# Patient Record
Sex: MALE | Race: WHITE | Employment: OTHER | URBAN - METROPOLITAN AREA
[De-identification: names, ages, dates, MRNs, and addresses within clinical notes are randomized per-mention and may not be internally consistent; named-entity substitution may affect disease eponyms.]

---

## 2019-12-20 ENCOUNTER — APPOINTMENT (OUTPATIENT)
Dept: CT IMAGING | Age: 74
DRG: 293 | End: 2019-12-20
Payer: MEDICARE

## 2019-12-20 ENCOUNTER — HOSPITAL ENCOUNTER (INPATIENT)
Age: 74
LOS: 2 days | Discharge: HOME | DRG: 293 | End: 2019-12-24
Attending: EMERGENCY MEDICINE | Admitting: INTERNAL MEDICINE
Payer: MEDICARE

## 2019-12-20 ENCOUNTER — APPOINTMENT (OUTPATIENT)
Dept: GENERAL RADIOLOGY | Age: 74
DRG: 293 | End: 2019-12-20
Payer: MEDICARE

## 2019-12-20 PROBLEM — I50.9 CHF WITH UNKNOWN LVEF (HCC): Status: ACTIVE | Noted: 2019-12-20

## 2019-12-20 PROBLEM — I82.409 DVT (DEEP VENOUS THROMBOSIS) (HCC): Chronic | Status: ACTIVE | Noted: 2019-12-20

## 2019-12-20 PROBLEM — E87.70 VOLUME OVERLOAD: Status: RESOLVED | Noted: 2019-12-20 | Resolved: 2019-12-20

## 2019-12-20 PROBLEM — E87.70 VOLUME OVERLOAD: Status: ACTIVE | Noted: 2019-12-20

## 2019-12-20 LAB
ALBUMIN SERPL-MCNC: 4 G/DL (ref 3.5–5.2)
ALP BLD-CCNC: 48 U/L (ref 40–129)
ALT SERPL-CCNC: 16 U/L (ref 0–40)
ANION GAP SERPL CALCULATED.3IONS-SCNC: 15 MMOL/L (ref 7–16)
AST SERPL-CCNC: 33 U/L (ref 0–39)
BASOPHILS ABSOLUTE: 0.05 E9/L (ref 0–0.2)
BASOPHILS RELATIVE PERCENT: 0.8 % (ref 0–2)
BILIRUB SERPL-MCNC: 0.6 MG/DL (ref 0–1.2)
BUN BLDV-MCNC: 11 MG/DL (ref 8–23)
CALCIUM SERPL-MCNC: 8.6 MG/DL (ref 8.6–10.2)
CHLORIDE BLD-SCNC: 99 MMOL/L (ref 98–107)
CO2: 28 MMOL/L (ref 22–29)
CREAT SERPL-MCNC: 0.8 MG/DL (ref 0.7–1.2)
EOSINOPHILS ABSOLUTE: 0.07 E9/L (ref 0.05–0.5)
EOSINOPHILS RELATIVE PERCENT: 1.1 % (ref 0–6)
GFR AFRICAN AMERICAN: >60
GFR NON-AFRICAN AMERICAN: >60 ML/MIN/1.73
GLUCOSE BLD-MCNC: 130 MG/DL (ref 74–99)
HCT VFR BLD CALC: 38 % (ref 37–54)
HEMOGLOBIN: 12.7 G/DL (ref 12.5–16.5)
IMMATURE GRANULOCYTES #: 0.03 E9/L
IMMATURE GRANULOCYTES %: 0.5 % (ref 0–5)
LACTIC ACID: 1.7 MMOL/L (ref 0.5–2.2)
LYMPHOCYTES ABSOLUTE: 1.71 E9/L (ref 1.5–4)
LYMPHOCYTES RELATIVE PERCENT: 26.5 % (ref 20–42)
MCH RBC QN AUTO: 34.6 PG (ref 26–35)
MCHC RBC AUTO-ENTMCNC: 33.4 % (ref 32–34.5)
MCV RBC AUTO: 103.5 FL (ref 80–99.9)
MONOCYTES ABSOLUTE: 0.67 E9/L (ref 0.1–0.95)
MONOCYTES RELATIVE PERCENT: 10.4 % (ref 2–12)
NEUTROPHILS ABSOLUTE: 3.93 E9/L (ref 1.8–7.3)
NEUTROPHILS RELATIVE PERCENT: 60.7 % (ref 43–80)
PDW BLD-RTO: 13.3 FL (ref 11.5–15)
PLATELET # BLD: 261 E9/L (ref 130–450)
PMV BLD AUTO: 9.6 FL (ref 7–12)
POTASSIUM SERPL-SCNC: 3 MMOL/L (ref 3.5–5)
PRO-BNP: 2947 PG/ML (ref 0–450)
RBC # BLD: 3.67 E12/L (ref 3.8–5.8)
SODIUM BLD-SCNC: 142 MMOL/L (ref 132–146)
TOTAL PROTEIN: 7.6 G/DL (ref 6.4–8.3)
TROPONIN: <0.01 NG/ML (ref 0–0.03)
WBC # BLD: 6.5 E9/L (ref 4.5–11.5)

## 2019-12-20 PROCEDURE — 2580000003 HC RX 258: Performed by: INTERNAL MEDICINE

## 2019-12-20 PROCEDURE — 96376 TX/PRO/DX INJ SAME DRUG ADON: CPT

## 2019-12-20 PROCEDURE — 36415 COLL VENOUS BLD VENIPUNCTURE: CPT

## 2019-12-20 PROCEDURE — 6360000002 HC RX W HCPCS: Performed by: EMERGENCY MEDICINE

## 2019-12-20 PROCEDURE — 96374 THER/PROPH/DIAG INJ IV PUSH: CPT

## 2019-12-20 PROCEDURE — 96375 TX/PRO/DX INJ NEW DRUG ADDON: CPT

## 2019-12-20 PROCEDURE — G0378 HOSPITAL OBSERVATION PER HR: HCPCS

## 2019-12-20 PROCEDURE — 83605 ASSAY OF LACTIC ACID: CPT

## 2019-12-20 PROCEDURE — 6370000000 HC RX 637 (ALT 250 FOR IP): Performed by: INTERNAL MEDICINE

## 2019-12-20 PROCEDURE — 71275 CT ANGIOGRAPHY CHEST: CPT

## 2019-12-20 PROCEDURE — 84484 ASSAY OF TROPONIN QUANT: CPT

## 2019-12-20 PROCEDURE — 83880 ASSAY OF NATRIURETIC PEPTIDE: CPT

## 2019-12-20 PROCEDURE — 6360000004 HC RX CONTRAST MEDICATION: Performed by: RADIOLOGY

## 2019-12-20 PROCEDURE — 99285 EMERGENCY DEPT VISIT HI MDM: CPT

## 2019-12-20 PROCEDURE — 2580000003 HC RX 258: Performed by: RADIOLOGY

## 2019-12-20 PROCEDURE — 93005 ELECTROCARDIOGRAM TRACING: CPT | Performed by: EMERGENCY MEDICINE

## 2019-12-20 PROCEDURE — 71045 X-RAY EXAM CHEST 1 VIEW: CPT

## 2019-12-20 PROCEDURE — 6370000000 HC RX 637 (ALT 250 FOR IP): Performed by: EMERGENCY MEDICINE

## 2019-12-20 PROCEDURE — 80053 COMPREHEN METABOLIC PANEL: CPT

## 2019-12-20 PROCEDURE — 85025 COMPLETE CBC W/AUTO DIFF WBC: CPT

## 2019-12-20 PROCEDURE — 94640 AIRWAY INHALATION TREATMENT: CPT

## 2019-12-20 PROCEDURE — 6360000002 HC RX W HCPCS: Performed by: INTERNAL MEDICINE

## 2019-12-20 PROCEDURE — 94664 DEMO&/EVAL PT USE INHALER: CPT

## 2019-12-20 PROCEDURE — 94761 N-INVAS EAR/PLS OXIMETRY MLT: CPT

## 2019-12-20 RX ORDER — IPRATROPIUM BROMIDE AND ALBUTEROL SULFATE 2.5; .5 MG/3ML; MG/3ML
1 SOLUTION RESPIRATORY (INHALATION)
Status: DISCONTINUED | OUTPATIENT
Start: 2019-12-20 | End: 2019-12-24 | Stop reason: HOSPADM

## 2019-12-20 RX ORDER — ALLOPURINOL 300 MG/1
300 TABLET ORAL DAILY
COMMUNITY

## 2019-12-20 RX ORDER — ALLOPURINOL 300 MG/1
300 TABLET ORAL DAILY
Status: DISCONTINUED | OUTPATIENT
Start: 2019-12-21 | End: 2019-12-24 | Stop reason: HOSPADM

## 2019-12-20 RX ORDER — POTASSIUM CHLORIDE 20 MEQ/1
40 TABLET, EXTENDED RELEASE ORAL ONCE
Status: COMPLETED | OUTPATIENT
Start: 2019-12-20 | End: 2019-12-20

## 2019-12-20 RX ORDER — SODIUM CHLORIDE 0.9 % (FLUSH) 0.9 %
10 SYRINGE (ML) INJECTION PRN
Status: DISCONTINUED | OUTPATIENT
Start: 2019-12-20 | End: 2019-12-24 | Stop reason: HOSPADM

## 2019-12-20 RX ORDER — HYDROCODONE BITARTRATE AND ACETAMINOPHEN 7.5; 325 MG/1; MG/1
1 TABLET ORAL EVERY 6 HOURS PRN
Status: ON HOLD | COMMUNITY
End: 2021-01-07 | Stop reason: SDUPTHER

## 2019-12-20 RX ORDER — LOPERAMIDE HYDROCHLORIDE 2 MG/1
2 CAPSULE ORAL
COMMUNITY

## 2019-12-20 RX ORDER — OMEPRAZOLE 20 MG/1
20 CAPSULE, DELAYED RELEASE ORAL DAILY
Status: ON HOLD | COMMUNITY
End: 2021-01-07 | Stop reason: HOSPADM

## 2019-12-20 RX ORDER — FUROSEMIDE 10 MG/ML
40 INJECTION INTRAMUSCULAR; INTRAVENOUS ONCE
Status: COMPLETED | OUTPATIENT
Start: 2019-12-20 | End: 2019-12-20

## 2019-12-20 RX ORDER — PANTOPRAZOLE SODIUM 40 MG/1
40 TABLET, DELAYED RELEASE ORAL
Status: DISCONTINUED | OUTPATIENT
Start: 2019-12-21 | End: 2019-12-24 | Stop reason: HOSPADM

## 2019-12-20 RX ORDER — SODIUM CHLORIDE 0.9 % (FLUSH) 0.9 %
10 SYRINGE (ML) INJECTION ONCE
Status: COMPLETED | OUTPATIENT
Start: 2019-12-20 | End: 2019-12-20

## 2019-12-20 RX ORDER — ONDANSETRON 2 MG/ML
4 INJECTION INTRAMUSCULAR; INTRAVENOUS EVERY 6 HOURS PRN
Status: DISCONTINUED | OUTPATIENT
Start: 2019-12-20 | End: 2019-12-24 | Stop reason: HOSPADM

## 2019-12-20 RX ORDER — ACETAMINOPHEN 325 MG/1
650 TABLET ORAL EVERY 4 HOURS PRN
Status: DISCONTINUED | OUTPATIENT
Start: 2019-12-20 | End: 2019-12-24 | Stop reason: HOSPADM

## 2019-12-20 RX ORDER — HYDROCODONE BITARTRATE AND ACETAMINOPHEN 7.5; 325 MG/1; MG/1
1 TABLET ORAL EVERY 6 HOURS PRN
Status: DISCONTINUED | OUTPATIENT
Start: 2019-12-20 | End: 2019-12-24 | Stop reason: HOSPADM

## 2019-12-20 RX ORDER — SODIUM CHLORIDE 0.9 % (FLUSH) 0.9 %
10 SYRINGE (ML) INJECTION EVERY 12 HOURS SCHEDULED
Status: DISCONTINUED | OUTPATIENT
Start: 2019-12-20 | End: 2019-12-24 | Stop reason: HOSPADM

## 2019-12-20 RX ORDER — FUROSEMIDE 10 MG/ML
40 INJECTION INTRAMUSCULAR; INTRAVENOUS 2 TIMES DAILY
Status: DISCONTINUED | OUTPATIENT
Start: 2019-12-20 | End: 2019-12-23

## 2019-12-20 RX ADMIN — POTASSIUM CHLORIDE 40 MEQ: 1500 TABLET, EXTENDED RELEASE ORAL at 12:55

## 2019-12-20 RX ADMIN — Medication 10 ML: at 12:32

## 2019-12-20 RX ADMIN — SODIUM CHLORIDE, PRESERVATIVE FREE 10 ML: 5 INJECTION INTRAVENOUS at 21:16

## 2019-12-20 RX ADMIN — APIXABAN 5 MG: 5 TABLET, FILM COATED ORAL at 21:15

## 2019-12-20 RX ADMIN — IOPAMIDOL 60 ML: 755 INJECTION, SOLUTION INTRAVENOUS at 12:32

## 2019-12-20 RX ADMIN — FUROSEMIDE 40 MG: 10 INJECTION, SOLUTION INTRAMUSCULAR; INTRAVENOUS at 14:24

## 2019-12-20 RX ADMIN — POTASSIUM CHLORIDE 40 MEQ: 1500 TABLET, EXTENDED RELEASE ORAL at 16:33

## 2019-12-20 RX ADMIN — IPRATROPIUM BROMIDE AND ALBUTEROL SULFATE 1 AMPULE: 2.5; .5 SOLUTION RESPIRATORY (INHALATION) at 20:04

## 2019-12-20 RX ADMIN — IPRATROPIUM BROMIDE AND ALBUTEROL SULFATE 1 AMPULE: 2.5; .5 SOLUTION RESPIRATORY (INHALATION) at 16:52

## 2019-12-20 RX ADMIN — FUROSEMIDE 40 MG: 10 INJECTION, SOLUTION INTRAMUSCULAR; INTRAVENOUS at 18:25

## 2019-12-20 ASSESSMENT — PAIN DESCRIPTION - ONSET: ONSET: ON-GOING

## 2019-12-20 ASSESSMENT — PAIN SCALES - GENERAL
PAINLEVEL_OUTOF10: 0
PAINLEVEL_OUTOF10: 5
PAINLEVEL_OUTOF10: 5

## 2019-12-20 ASSESSMENT — PAIN DESCRIPTION - LOCATION
LOCATION: CHEST
LOCATION: CHEST

## 2019-12-20 ASSESSMENT — PAIN DESCRIPTION - PAIN TYPE
TYPE: ACUTE PAIN
TYPE: ACUTE PAIN

## 2019-12-20 ASSESSMENT — PAIN DESCRIPTION - FREQUENCY: FREQUENCY: CONTINUOUS

## 2019-12-20 ASSESSMENT — PAIN - FUNCTIONAL ASSESSMENT: PAIN_FUNCTIONAL_ASSESSMENT: ACTIVITIES ARE NOT PREVENTED

## 2019-12-20 ASSESSMENT — PAIN DESCRIPTION - PROGRESSION: CLINICAL_PROGRESSION: NOT CHANGED

## 2019-12-20 ASSESSMENT — PAIN DESCRIPTION - DESCRIPTORS: DESCRIPTORS: CRAMPING;CRUSHING;DISCOMFORT

## 2019-12-20 NOTE — ED PROVIDER NOTES
HPI:  12/20/19,   Time: 1:44 PM       Max Ponce is a 66 y.o. male presenting to the ED for shortness of breath. Patient states he was short of breath since yesterday. Does complain of intermittent chest heaviness. Has no chest heaviness now. Does not wear oxygen. Does have a history of DVT, he takes his Eliquis intermittently. Denies any fever, chills,, sputum, nausea, vomit, change in bowel bladder, neck pain or stiffness, or any other symptoms or complaints. Review of Systems:   Pertinent positives and negatives are stated within HPI, all other systems reviewed and are negative.          --------------------------------------------- PAST HISTORY ---------------------------------------------  Past Medical History:  has no past medical history on file. Past Surgical History:  has no past surgical history on file. Social History:      Family History: family history is not on file. The patients home medications have been reviewed. Allergies: Patient has no known allergies. ---------------------------------------------------PHYSICAL EXAM--------------------------------------    Constitutional/General: Alert and oriented x3, well appearing, non toxic in NAD  Head: Normocephalic and atraumatic  Eyes: PERRL, EOMI, conjunctive normal, sclera non icteric  Mouth: Oropharynx clear, handling secretions, no trismus, no asymmetry of the posterior oropharynx or uvular edema  Neck: Supple, full ROM, non tender to palpation in the midline, no stridor, no crepitus, no meningeal signs  Respiratory: Lungs clear to auscultation bilaterally, no wheezes, rales, or rhonchi. Not in respiratory distress  Cardiovascular:  Regular rate. Regular rhythm. No murmurs, gallops, or rubs. 2+ distal pulses  Chest: No chest wall tenderness  GI:  Abdomen Soft, Non tender, Non distended. +BS. No organomegaly, no palpable masses,  No rebound, guarding, or rigidity. Musculoskeletal: Moves all extremities x 4. mg/dL    Alkaline Phosphatase 48 40 - 129 U/L    ALT 16 0 - 40 U/L    AST 33 0 - 39 U/L   Troponin   Result Value Ref Range    Troponin <0.01 0.00 - 0.03 ng/mL   Brain Natriuretic Peptide   Result Value Ref Range    Pro-BNP 2,947 (H) 0 - 450 pg/mL   Lactic Acid, Plasma   Result Value Ref Range    Lactic Acid 1.7 0.5 - 2.2 mmol/L       RADIOLOGY:  Interpreted by Radiologist.  CTA PULMONARY W CONTRAST   Final Result      No CT evidence for pulmonary embolism or other significant vascular   abnormality      Bilateral posterior pleural effusions      Parenchymal findings suggesting early pulmonary edema         XR CHEST PORTABLE   Final Result      Cardiomegaly without failure      Basilar areas of parenchymal opacification possibly chronic             EKG:  This EKG is signed and interpreted by the EP. Sinus, rate 108, no STEMI      ------------------------- NURSING NOTES AND VITALS REVIEWED ---------------------------   The nursing notes within the ED encounter and vital signs as below have been reviewed by myself. BP (!) 186/114   Pulse 98   Temp 98.2 °F (36.8 °C) (Infrared)   Resp 18   Ht 5' 11\" (1.803 m)   Wt 200 lb (90.7 kg)   SpO2 96%   BMI 27.89 kg/m²   Oxygen Saturation Interpretation: Improved after treatment    The patients available past medical records and past encounters were reviewed. ------------------------------ ED COURSE/MEDICAL DECISION MAKING----------------------  Medications   furosemide (LASIX) injection 40 mg (has no administration in time range)   potassium chloride (KLOR-CON M) extended release tablet 40 mEq (40 mEq Oral Given 12/20/19 1255)   sodium chloride flush 0.9 % injection 10 mL (10 mLs Intravenous Given 12/20/19 1232)   iopamidol (ISOVUE-370) 76 % injection 60 mL (60 mLs Intravenous Given 12/20/19 1232)         ED COURSE:       Medical Decision Making:    Presents hypoxemic. Did receive oxygen responded. Labs imaging reviewed.   Does have lateral pleural effusion,

## 2019-12-20 NOTE — LETTER
Beneficiary Notification Letter  BPCI Advanced     Your Doctor or [address],    We wanted to let you know that your health care provider, [address] [place] [address] Chris, has volunteered to take part in our [facility] for [facility] & Medicaid Services (CMS) Bundled Payments for [facility] (BPCI Advanced). This doesnt change your Medicare rights or benefits and you dont need to do anything. What are bundled payments? A bundled payment combines, or bundles together, payments that Medicare makes to your health care providers for the many different kinds of medical services you might get in a specific time period. In BPCI Advanced, this time period could include a hospital inpatient stay or outpatient procedure, plus 90 days. Why would Medicare bundle payments? Bundled payments are thought of as a value-based way to pay because health care providers are responsible for both the quality and cost of medical care they give. This is a relatively new way of paying health care providers compared to thefee-for-service way Medicare has traditionally paid, where providers are paid separately for each service they provide. Bundled payments encourage these providers to work together to provide better, more coordinated care during your hospital stay, or outpatient procedure, and through your recovery. What does BPCI Advance mean for me? Youre more likely to get even better care when hospitals, doctors, and other health care providers work together. In BPCI Advanced, hospitals, doctors, and other health care providers may be rewarded for providing better, more coordinated health care. Medicare will watch BPCI Advanced participants closely to make sure that you and other patients keep getting efficient, high quality care. What do I need to know about BPCI Advanced? Whats most important for you to know is that your Medicare rights and benefits wont change because your health care provider is participating in [address] [place]. Medicare will keep covering all of your medically necessary services. Even though Medicare will pay your doctor in a different way under BPCI Advanced, how much you have to pay wont change. Health care providers and suppliers who are enrolled in Medicare will submit their Medicare claims like they always have. Youll have all the same Medicare rights and protections, including the right to choose which hospital, doctor, or other health care provider you see. If you dont want to get care from a health care provider whos participating in [address] [place], then youll have to choose a different health care provider whos not participating in the Model. How can I give feedback about my health care? Medicare might ask you to take a voluntary survey about the services and care you received from [address] [place] [address] during your hospital stay or outpatient procedure and for a specific period of time afterwards. You can decide whether you want to take the voluntary survey, but if you do, itll help Medicare make BPCI Advanced and the care of other Medicare patients better. If you have concerns or complaints about your care, you can:   · Talk to your doctor or health care provider. · Contact your Beneficiary and Family Centered Care Quality Improvement   Organization [facility]). You can get your [employer]-QIOs phone number  at  Medicare.gov/contacts or by calling 1-800-MEDICARE. TTY users can call  6-166.510.2390. Where can I learn more about BPCI Advanced? Learn more about BPCI Advanced at https://innovation.cms.gov/initiatives/bpci-advanced/:  · A list of all the hospitals and physician group practices in the country participating in [address] [place].

## 2019-12-21 LAB
ALBUMIN SERPL-MCNC: 3.9 G/DL (ref 3.5–5.2)
ALP BLD-CCNC: 49 U/L (ref 40–129)
ALT SERPL-CCNC: 13 U/L (ref 0–40)
ANION GAP SERPL CALCULATED.3IONS-SCNC: 17 MMOL/L (ref 7–16)
ANION GAP SERPL CALCULATED.3IONS-SCNC: 20 MMOL/L (ref 7–16)
AST SERPL-CCNC: 23 U/L (ref 0–39)
BASOPHILS ABSOLUTE: 0.03 E9/L (ref 0–0.2)
BASOPHILS RELATIVE PERCENT: 0.6 % (ref 0–2)
BILIRUB SERPL-MCNC: 0.7 MG/DL (ref 0–1.2)
BUN BLDV-MCNC: 10 MG/DL (ref 8–23)
BUN BLDV-MCNC: 9 MG/DL (ref 8–23)
CALCIUM SERPL-MCNC: 8.9 MG/DL (ref 8.6–10.2)
CALCIUM SERPL-MCNC: 9.5 MG/DL (ref 8.6–10.2)
CHLORIDE BLD-SCNC: 96 MMOL/L (ref 98–107)
CHLORIDE BLD-SCNC: 96 MMOL/L (ref 98–107)
CHOLESTEROL, TOTAL: 170 MG/DL (ref 0–199)
CO2: 25 MMOL/L (ref 22–29)
CO2: 31 MMOL/L (ref 22–29)
CREAT SERPL-MCNC: 0.9 MG/DL (ref 0.7–1.2)
CREAT SERPL-MCNC: 0.9 MG/DL (ref 0.7–1.2)
EKG ATRIAL RATE: 108 BPM
EKG P AXIS: 118 DEGREES
EKG P-R INTERVAL: 192 MS
EKG Q-T INTERVAL: 362 MS
EKG QRS DURATION: 110 MS
EKG QTC CALCULATION (BAZETT): 485 MS
EKG R AXIS: -26 DEGREES
EKG T AXIS: 48 DEGREES
EKG VENTRICULAR RATE: 108 BPM
EOSINOPHILS ABSOLUTE: 0.07 E9/L (ref 0.05–0.5)
EOSINOPHILS RELATIVE PERCENT: 1.4 % (ref 0–6)
GFR AFRICAN AMERICAN: >60
GFR AFRICAN AMERICAN: >60
GFR NON-AFRICAN AMERICAN: >60 ML/MIN/1.73
GFR NON-AFRICAN AMERICAN: >60 ML/MIN/1.73
GLUCOSE BLD-MCNC: 111 MG/DL (ref 74–99)
GLUCOSE BLD-MCNC: 125 MG/DL (ref 74–99)
HCT VFR BLD CALC: 39.6 % (ref 37–54)
HDLC SERPL-MCNC: 74 MG/DL
HEMOGLOBIN: 13.4 G/DL (ref 12.5–16.5)
IMMATURE GRANULOCYTES #: 0.03 E9/L
IMMATURE GRANULOCYTES %: 0.6 % (ref 0–5)
LDL CHOLESTEROL CALCULATED: 75 MG/DL (ref 0–99)
LV EF: 43 %
LVEF MODALITY: NORMAL
LYMPHOCYTES ABSOLUTE: 1.36 E9/L (ref 1.5–4)
LYMPHOCYTES RELATIVE PERCENT: 26.5 % (ref 20–42)
MAGNESIUM: 1 MG/DL (ref 1.6–2.6)
MAGNESIUM: 1.7 MG/DL (ref 1.6–2.6)
MCH RBC QN AUTO: 34.8 PG (ref 26–35)
MCHC RBC AUTO-ENTMCNC: 33.8 % (ref 32–34.5)
MCV RBC AUTO: 102.9 FL (ref 80–99.9)
MONOCYTES ABSOLUTE: 0.63 E9/L (ref 0.1–0.95)
MONOCYTES RELATIVE PERCENT: 12.3 % (ref 2–12)
NEUTROPHILS ABSOLUTE: 3.02 E9/L (ref 1.8–7.3)
NEUTROPHILS RELATIVE PERCENT: 58.6 % (ref 43–80)
PDW BLD-RTO: 13.1 FL (ref 11.5–15)
PLATELET # BLD: 252 E9/L (ref 130–450)
PMV BLD AUTO: 9.9 FL (ref 7–12)
POTASSIUM SERPL-SCNC: 2.8 MMOL/L (ref 3.5–5)
POTASSIUM SERPL-SCNC: 3.4 MMOL/L (ref 3.5–5)
RBC # BLD: 3.85 E12/L (ref 3.8–5.8)
SODIUM BLD-SCNC: 141 MMOL/L (ref 132–146)
SODIUM BLD-SCNC: 144 MMOL/L (ref 132–146)
T4 FREE: 1.23 NG/DL (ref 0.93–1.7)
TOTAL PROTEIN: 7 G/DL (ref 6.4–8.3)
TRIGL SERPL-MCNC: 107 MG/DL (ref 0–149)
TSH SERPL DL<=0.05 MIU/L-ACNC: 3.9 UIU/ML (ref 0.27–4.2)
VLDLC SERPL CALC-MCNC: 21 MG/DL
WBC # BLD: 5.1 E9/L (ref 4.5–11.5)

## 2019-12-21 PROCEDURE — 6370000000 HC RX 637 (ALT 250 FOR IP): Performed by: INTERNAL MEDICINE

## 2019-12-21 PROCEDURE — 93306 TTE W/DOPPLER COMPLETE: CPT

## 2019-12-21 PROCEDURE — 83735 ASSAY OF MAGNESIUM: CPT

## 2019-12-21 PROCEDURE — 93010 ELECTROCARDIOGRAM REPORT: CPT | Performed by: INTERNAL MEDICINE

## 2019-12-21 PROCEDURE — 2580000003 HC RX 258: Performed by: INTERNAL MEDICINE

## 2019-12-21 PROCEDURE — G0378 HOSPITAL OBSERVATION PER HR: HCPCS

## 2019-12-21 PROCEDURE — 96366 THER/PROPH/DIAG IV INF ADDON: CPT

## 2019-12-21 PROCEDURE — 6360000002 HC RX W HCPCS: Performed by: PHYSICIAN ASSISTANT

## 2019-12-21 PROCEDURE — 6360000002 HC RX W HCPCS: Performed by: INTERNAL MEDICINE

## 2019-12-21 PROCEDURE — 84443 ASSAY THYROID STIM HORMONE: CPT

## 2019-12-21 PROCEDURE — 96376 TX/PRO/DX INJ SAME DRUG ADON: CPT

## 2019-12-21 PROCEDURE — 85025 COMPLETE CBC W/AUTO DIFF WBC: CPT

## 2019-12-21 PROCEDURE — 80053 COMPREHEN METABOLIC PANEL: CPT

## 2019-12-21 PROCEDURE — 6370000000 HC RX 637 (ALT 250 FOR IP): Performed by: PHYSICIAN ASSISTANT

## 2019-12-21 PROCEDURE — 94640 AIRWAY INHALATION TREATMENT: CPT

## 2019-12-21 PROCEDURE — 84439 ASSAY OF FREE THYROXINE: CPT

## 2019-12-21 PROCEDURE — 80048 BASIC METABOLIC PNL TOTAL CA: CPT

## 2019-12-21 PROCEDURE — 96365 THER/PROPH/DIAG IV INF INIT: CPT

## 2019-12-21 PROCEDURE — 36415 COLL VENOUS BLD VENIPUNCTURE: CPT

## 2019-12-21 PROCEDURE — 80061 LIPID PANEL: CPT

## 2019-12-21 RX ORDER — MAGNESIUM SULFATE IN WATER 40 MG/ML
2 INJECTION, SOLUTION INTRAVENOUS ONCE
Status: COMPLETED | OUTPATIENT
Start: 2019-12-21 | End: 2019-12-21

## 2019-12-21 RX ORDER — POTASSIUM CHLORIDE 20 MEQ/1
40 TABLET, EXTENDED RELEASE ORAL DAILY
Status: DISCONTINUED | OUTPATIENT
Start: 2019-12-21 | End: 2019-12-22

## 2019-12-21 RX ORDER — POTASSIUM CHLORIDE 7.45 MG/ML
10 INJECTION INTRAVENOUS PRN
Status: DISCONTINUED | OUTPATIENT
Start: 2019-12-21 | End: 2019-12-24 | Stop reason: HOSPADM

## 2019-12-21 RX ORDER — POTASSIUM CHLORIDE 20 MEQ/1
40 TABLET, EXTENDED RELEASE ORAL PRN
Status: DISCONTINUED | OUTPATIENT
Start: 2019-12-21 | End: 2019-12-23

## 2019-12-21 RX ADMIN — IPRATROPIUM BROMIDE AND ALBUTEROL SULFATE 1 AMPULE: 2.5; .5 SOLUTION RESPIRATORY (INHALATION) at 08:39

## 2019-12-21 RX ADMIN — ALLOPURINOL 300 MG: 300 TABLET ORAL at 09:29

## 2019-12-21 RX ADMIN — SODIUM CHLORIDE, PRESERVATIVE FREE 10 ML: 5 INJECTION INTRAVENOUS at 09:30

## 2019-12-21 RX ADMIN — POTASSIUM CHLORIDE 40 MEQ: 1500 TABLET, EXTENDED RELEASE ORAL at 16:46

## 2019-12-21 RX ADMIN — FUROSEMIDE 40 MG: 10 INJECTION, SOLUTION INTRAMUSCULAR; INTRAVENOUS at 09:35

## 2019-12-21 RX ADMIN — FUROSEMIDE 40 MG: 10 INJECTION, SOLUTION INTRAMUSCULAR; INTRAVENOUS at 17:51

## 2019-12-21 RX ADMIN — IPRATROPIUM BROMIDE AND ALBUTEROL SULFATE 1 AMPULE: 2.5; .5 SOLUTION RESPIRATORY (INHALATION) at 20:46

## 2019-12-21 RX ADMIN — POTASSIUM CHLORIDE 40 MEQ: 1500 TABLET, EXTENDED RELEASE ORAL at 09:29

## 2019-12-21 RX ADMIN — PANTOPRAZOLE SODIUM 40 MG: 40 TABLET, DELAYED RELEASE ORAL at 09:30

## 2019-12-21 RX ADMIN — SODIUM CHLORIDE, PRESERVATIVE FREE 10 ML: 5 INJECTION INTRAVENOUS at 17:51

## 2019-12-21 RX ADMIN — APIXABAN 5 MG: 5 TABLET, FILM COATED ORAL at 19:55

## 2019-12-21 RX ADMIN — APIXABAN 5 MG: 5 TABLET, FILM COATED ORAL at 09:30

## 2019-12-21 RX ADMIN — MAGNESIUM SULFATE HEPTAHYDRATE 2 G: 40 INJECTION, SOLUTION INTRAVENOUS at 09:28

## 2019-12-21 RX ADMIN — IPRATROPIUM BROMIDE AND ALBUTEROL SULFATE 1 AMPULE: 2.5; .5 SOLUTION RESPIRATORY (INHALATION) at 15:53

## 2019-12-21 RX ADMIN — SODIUM CHLORIDE, PRESERVATIVE FREE 10 ML: 5 INJECTION INTRAVENOUS at 19:55

## 2019-12-21 ASSESSMENT — PAIN SCALES - GENERAL
PAINLEVEL_OUTOF10: 0

## 2019-12-21 NOTE — H&P
Cabrera Givens M.D. History and Physical      CHIEF COMPLAINT:  Band of tightness and sob     Reason for Admission:  Chest pain     History Obtained From:  Patient     HISTORY OF PRESENT ILLNESS:      The patient is a 66 y.o. male of Priti Putnam MD with significant past medical history of np medical conditions except htn presents with  shortness of brath and chest pain. He was at rest when it started. No radiation of pain . He has noted swelling in legs. No other complaints. Pt with h/o DVT in leg on eliquis . cta chest negative for pe   ED course with   /67   Pulse 111   Temp 97.9 °F (36.6 °C) (Temporal)   Resp 20   Ht 5' 11\" (1.803 m)   Wt 194 lb (88 kg)   SpO2 92%   BMI 27.06 kg/m²   bnp 3000  Hypokalemia'  hypomag  Past Medical History:    History reviewed. No pertinent past medical history. Past Surgical History:    No past surgical history on file. Medications Prior to Admission:    Medications Prior to Admission: allopurinol (ZYLOPRIM) 300 MG tablet, Take 300 mg by mouth daily  apixaban (ELIQUIS) 5 MG TABS tablet, Take 5 mg by mouth 2 times daily  loperamide (IMODIUM) 2 MG capsule, Take 2 mg by mouth every 4-6 hours as needed for Diarrhea  HYDROcodone-acetaminophen (NORCO) 7.5-325 MG per tablet, Take 1 tablet by mouth every 6 hours as needed for Pain. omeprazole (PRILOSEC) 20 MG delayed release capsule, Take 20 mg by mouth daily  influenza virus trivalent vaccine (FLUZONE) injection, Inject 0.5 mLs into the muscle once Given 10/2019    Allergies:  Patient has no known allergies. Social History:   TOBACCO:   has no history on file for tobacco.  ETOH:   has no history on file for alcohol. MARITAL STATUS:    OCCUPATION:      Family History:   No family history on file.     REVIEW OF SYSTEMS:    General ROS:  As above   Hematological and Lymphatic ROS: negative  Endocrine ROS: negative  Respiratory ROS: no cough, shortness of breath, or echo pending   ic diuresis   cxr in am   Supplement mg and k   consult cards for new chf / ischemic eval   Continue eliquis for ho dvt     DVT Proph  PT?OT   dC plan         Benjie Habermann, MD  12/21/2019  3:25 PM

## 2019-12-22 PROBLEM — R07.9 CHEST PAIN: Status: ACTIVE | Noted: 2019-12-22

## 2019-12-22 LAB
ANION GAP SERPL CALCULATED.3IONS-SCNC: 21 MMOL/L (ref 7–16)
BUN BLDV-MCNC: 14 MG/DL (ref 8–23)
CALCIUM SERPL-MCNC: 8.9 MG/DL (ref 8.6–10.2)
CHLORIDE BLD-SCNC: 97 MMOL/L (ref 98–107)
CO2: 25 MMOL/L (ref 22–29)
CREAT SERPL-MCNC: 1.1 MG/DL (ref 0.7–1.2)
GFR AFRICAN AMERICAN: >60
GFR NON-AFRICAN AMERICAN: >60 ML/MIN/1.73
GLUCOSE BLD-MCNC: 117 MG/DL (ref 74–99)
MAGNESIUM: 1.4 MG/DL (ref 1.6–2.6)
POTASSIUM SERPL-SCNC: 3.2 MMOL/L (ref 3.5–5)
SODIUM BLD-SCNC: 143 MMOL/L (ref 132–146)

## 2019-12-22 PROCEDURE — 6370000000 HC RX 637 (ALT 250 FOR IP): Performed by: INTERNAL MEDICINE

## 2019-12-22 PROCEDURE — 2580000003 HC RX 258: Performed by: INTERNAL MEDICINE

## 2019-12-22 PROCEDURE — 36415 COLL VENOUS BLD VENIPUNCTURE: CPT

## 2019-12-22 PROCEDURE — 6360000002 HC RX W HCPCS: Performed by: INTERNAL MEDICINE

## 2019-12-22 PROCEDURE — 96376 TX/PRO/DX INJ SAME DRUG ADON: CPT

## 2019-12-22 PROCEDURE — 2060000000 HC ICU INTERMEDIATE R&B

## 2019-12-22 PROCEDURE — 94640 AIRWAY INHALATION TREATMENT: CPT

## 2019-12-22 PROCEDURE — 83735 ASSAY OF MAGNESIUM: CPT

## 2019-12-22 PROCEDURE — 80048 BASIC METABOLIC PNL TOTAL CA: CPT

## 2019-12-22 RX ORDER — MAGNESIUM SULFATE IN WATER 40 MG/ML
2 INJECTION, SOLUTION INTRAVENOUS ONCE
Status: COMPLETED | OUTPATIENT
Start: 2019-12-22 | End: 2019-12-22

## 2019-12-22 RX ORDER — SPIRONOLACTONE 25 MG/1
25 TABLET ORAL DAILY
Status: DISCONTINUED | OUTPATIENT
Start: 2019-12-22 | End: 2019-12-24 | Stop reason: HOSPADM

## 2019-12-22 RX ORDER — METOPROLOL SUCCINATE 50 MG/1
50 TABLET, EXTENDED RELEASE ORAL DAILY
Status: DISCONTINUED | OUTPATIENT
Start: 2019-12-22 | End: 2019-12-24 | Stop reason: HOSPADM

## 2019-12-22 RX ADMIN — FUROSEMIDE 40 MG: 10 INJECTION, SOLUTION INTRAMUSCULAR; INTRAVENOUS at 08:41

## 2019-12-22 RX ADMIN — SPIRONOLACTONE 25 MG: 25 TABLET ORAL at 17:52

## 2019-12-22 RX ADMIN — METOPROLOL SUCCINATE 50 MG: 50 TABLET, EXTENDED RELEASE ORAL at 17:52

## 2019-12-22 RX ADMIN — SODIUM CHLORIDE, PRESERVATIVE FREE 10 ML: 5 INJECTION INTRAVENOUS at 08:42

## 2019-12-22 RX ADMIN — ALLOPURINOL 300 MG: 300 TABLET ORAL at 08:41

## 2019-12-22 RX ADMIN — APIXABAN 5 MG: 5 TABLET, FILM COATED ORAL at 08:41

## 2019-12-22 RX ADMIN — FUROSEMIDE 40 MG: 10 INJECTION, SOLUTION INTRAMUSCULAR; INTRAVENOUS at 17:52

## 2019-12-22 RX ADMIN — PANTOPRAZOLE SODIUM 40 MG: 40 TABLET, DELAYED RELEASE ORAL at 05:30

## 2019-12-22 RX ADMIN — IPRATROPIUM BROMIDE AND ALBUTEROL SULFATE 1 AMPULE: 2.5; .5 SOLUTION RESPIRATORY (INHALATION) at 15:53

## 2019-12-22 RX ADMIN — SACUBITRIL AND VALSARTAN 1 TABLET: 24; 26 TABLET, FILM COATED ORAL at 20:09

## 2019-12-22 RX ADMIN — IPRATROPIUM BROMIDE AND ALBUTEROL SULFATE 1 AMPULE: 2.5; .5 SOLUTION RESPIRATORY (INHALATION) at 11:36

## 2019-12-22 RX ADMIN — MAGNESIUM SULFATE HEPTAHYDRATE 2 G: 40 INJECTION, SOLUTION INTRAVENOUS at 17:51

## 2019-12-22 RX ADMIN — IPRATROPIUM BROMIDE AND ALBUTEROL SULFATE 1 AMPULE: 2.5; .5 SOLUTION RESPIRATORY (INHALATION) at 20:23

## 2019-12-22 RX ADMIN — POTASSIUM CHLORIDE 40 MEQ: 1500 TABLET, EXTENDED RELEASE ORAL at 08:41

## 2019-12-22 RX ADMIN — APIXABAN 5 MG: 5 TABLET, FILM COATED ORAL at 20:09

## 2019-12-22 RX ADMIN — IPRATROPIUM BROMIDE AND ALBUTEROL SULFATE 1 AMPULE: 2.5; .5 SOLUTION RESPIRATORY (INHALATION) at 08:15

## 2019-12-22 RX ADMIN — SODIUM CHLORIDE, PRESERVATIVE FREE 10 ML: 5 INJECTION INTRAVENOUS at 20:09

## 2019-12-22 ASSESSMENT — PAIN SCALES - GENERAL
PAINLEVEL_OUTOF10: 0

## 2019-12-22 NOTE — PLAN OF CARE
Problem: Pain:  Goal: Pain level will decrease  Description  Pain level will decrease  Outcome: Met This Shift     Problem: Breathing Pattern - Ineffective:  Goal: Ability to achieve and maintain a regular respiratory rate will improve  Description  Ability to achieve and maintain a regular respiratory rate will improve  Outcome: Met This Shift

## 2019-12-22 NOTE — CONSULTS
· Constitutional: there has been no unanticipated weight loss. There's been no significant change in energy level, sleep pattern or activity level. No fever chills or rigors. · Eyes: No visual changes or diplopia. No scleral icterus. · ENT: No Headaches, hearing loss or vertigo. No mouth sores or sore throat. No change in taste or smell. · Cardiovascular: Had chest discomfort, dyspnea on exertion and now at rest, but no palpitations, loss of consciousness, no phlebitis, no claudication. · Respiratory: No cough or wheezing, no sputum production. No hemoptysis, pleuritic pain. · Gastrointestinal: No abdominal pain, appetite loss, blood in stools. No change in bowel habits. No hematemesis  · Genitourinary: No dysuria, trouble voiding or hematuria. No nocturia or increased frequency. · Musculoskeletal:  No gait disturbance, weakness or joint complaints. · Integumentary: No rash or pruritis. · Neurological: No headache, diplopia, change in muscle strength, numbness or tingling. No change in gait, balance, coordination, mood, affect, memory, mentation, behavior. · Psychiatric: No anxiety or depression. · Endocrine: No temperature intolerance. No excessive thirst, fluid intake, or urination. No tremor. · Hematologic/Lymphatic: No abnormal bruising or bleeding, blood clots or swollen lymph nodes. · Allergic/Immunologic: No nasal congestion or hives. Physical Examination:    Vital Signs: BP (!) 144/83   Pulse 108   Temp 97.8 °F (36.6 °C) (Oral)   Resp 18   Ht 5' 11\" (1.803 m)   Wt 195 lb 4.8 oz (88.6 kg)   SpO2 92%   BMI 27.24 kg/m²   General appearance: Well preserved, mesomorphic body habitus, alert, no distress. Skin: Skin color, texture, turgor normal. No rashes or lesions. No induration or tightening palpated. Head: Normocephalic. No masses, lesions, tenderness or abnormalities  Eyes: Conjunctivae/corneas clear. PERRL, EOMs intact. Sclera non icteric.   Ears: External ears normal. Canals clear. TM's clear bilaterally. Hearing normal to finger rub. Nose/Sinuses: Nares normal. Septum midline. Mucosa normal. No drainage or sinus tenderness. Oropharynx: Lips, mucosa, and tongue normal. Oropharynx clear with no exudate seen. Neck: Neck supple and symmetric. No adenopathy. Thyroid symmetric, normal size, without nodules. Trachea is midline. Carotids brisk in upstroke without bruits, no abnormal JVP noted at 45°. Chest: Even excursion  Lungs: Lungs clear to auscultation bilaterally. No retractions or use of accessory muscles. No tactile vocal fremitus. No rhonchi, crackles or rales. Heart:  S1 > S2. Regular rhythm. No gallop or murmur. No rub, palpable thrill or heave noted. PMI 5th intercostal space midclavicular line. Abdomen: Abdomen soft, moderately protuberant, non-tender. BS normal. No masses, organomegaly. No hernia noted. Extremities: Extremities normal. No deformities, edema, or skin discoloration. No cyanosis or clubbing noted to the nails. Peripheral pulses present 2+ upper extremities and present 1+  lower extremities. Musculoskeletal: Spine ROM normal. Muscular strength intact. Neuro: Cranial nerves intact. Motor: Strength 5/5 in all extremities. Reflexes 2+ in all extremities. No focal weakness. Sensory: grossly normal to touch. Coordination intact. Pertinent Labs:  CBC:   Recent Labs     12/20/19  0948 12/21/19  0513   WBC 6.5 5.1   HGB 12.7 13.4    252     BMP:  Recent Labs     12/21/19  0513 12/21/19  1444 12/22/19  0458    144 143   K 2.8* 3.4* 3.2*   CL 96* 96* 97*   CO2 25 31* 25   BUN 9 10 14   CREATININE 0.9 0.9 1.1   GLUCOSE 125* 111* 117*   LABGLOM >60 >60 >60     ABGs: No results found for: PH, PO2, PCO2  INR: No results for input(s): INR in the last 72 hours.   PRO-BNP:   Lab Results   Component Value Date    PROBNP 2,947 (H) 12/20/2019      Cardiac Injury Profile:   Recent Labs     12/20/19  0948   TROPONINI <0.01      Lipid Profile:   Lab Results edema     Assessment:    Principal Problem:    CHF with unknown LVEF ([facility])  Active Problems:    DVT (deep venous thrombosis) ([facility])    Chest pain  Resolved Problems:    Volume overload      Plan:  Based upon Mr. Dae Del Valle presentation, would appear that he may indeed have an underlying ischemic substrate resulting in his present left ventricular dysfunction and clinical symptoms. For this reason a nuclear stress test will be obtained in the morning and he will be placed on a nephrolysin inhibitor in addition to diuretic and mineralocorticoid inhibitors. Upon his nuclear stress test further recommendations will be made including the potential for cardiac catheterization and potential intervention. More importantly his medications will be titrated as tolerated. I have spent more than 45 minutes face to face with Neel Almaguer reviewing notes and laboratory data with greater than 50% of this time instructing and counseling the patient and his family member regarding my findings and recommendations and I have answered all questions as posed to me by Mr. Micaela Roberson. Thank you, Elina Chi MD for allowing me to consult in the care of this patient. Donald Johnson DO, FACP, FACC, FSCAI    NOTE:  This report was transcribed using voice recognition software. Every effort was made to ensure accuracy; however, inadvertent computerized transcription errors may be present.

## 2019-12-23 ENCOUNTER — APPOINTMENT (OUTPATIENT)
Dept: NUCLEAR MEDICINE | Age: 74
DRG: 293 | End: 2019-12-23
Payer: MEDICARE

## 2019-12-23 ENCOUNTER — APPOINTMENT (OUTPATIENT)
Dept: NON INVASIVE DIAGNOSTICS | Age: 74
DRG: 293 | End: 2019-12-23
Payer: MEDICARE

## 2019-12-23 LAB
ANION GAP SERPL CALCULATED.3IONS-SCNC: 15 MMOL/L (ref 7–16)
BUN BLDV-MCNC: 14 MG/DL (ref 8–23)
CALCIUM SERPL-MCNC: 9.7 MG/DL (ref 8.6–10.2)
CHLORIDE BLD-SCNC: 95 MMOL/L (ref 98–107)
CO2: 31 MMOL/L (ref 22–29)
CREAT SERPL-MCNC: 1 MG/DL (ref 0.7–1.2)
GFR AFRICAN AMERICAN: >60
GFR NON-AFRICAN AMERICAN: >60 ML/MIN/1.73
GLUCOSE BLD-MCNC: 123 MG/DL (ref 74–99)
LV EF: 28 %
LVEF MODALITY: NORMAL
MAGNESIUM: 1.8 MG/DL (ref 1.6–2.6)
POTASSIUM SERPL-SCNC: 4.5 MMOL/L (ref 3.5–5)
PRO-BNP: 746 PG/ML (ref 0–450)
SODIUM BLD-SCNC: 141 MMOL/L (ref 132–146)

## 2019-12-23 PROCEDURE — 80048 BASIC METABOLIC PNL TOTAL CA: CPT

## 2019-12-23 PROCEDURE — A9500 TC99M SESTAMIBI: HCPCS | Performed by: RADIOLOGY

## 2019-12-23 PROCEDURE — 6370000000 HC RX 637 (ALT 250 FOR IP): Performed by: INTERNAL MEDICINE

## 2019-12-23 PROCEDURE — 36415 COLL VENOUS BLD VENIPUNCTURE: CPT

## 2019-12-23 PROCEDURE — 78452 HT MUSCLE IMAGE SPECT MULT: CPT

## 2019-12-23 PROCEDURE — 2580000003 HC RX 258: Performed by: INTERNAL MEDICINE

## 2019-12-23 PROCEDURE — 93017 CV STRESS TEST TRACING ONLY: CPT

## 2019-12-23 PROCEDURE — 94640 AIRWAY INHALATION TREATMENT: CPT

## 2019-12-23 PROCEDURE — 2060000000 HC ICU INTERMEDIATE R&B

## 2019-12-23 PROCEDURE — 6360000002 HC RX W HCPCS: Performed by: INTERNAL MEDICINE

## 2019-12-23 PROCEDURE — 83735 ASSAY OF MAGNESIUM: CPT

## 2019-12-23 PROCEDURE — 3430000000 HC RX DIAGNOSTIC RADIOPHARMACEUTICAL: Performed by: RADIOLOGY

## 2019-12-23 PROCEDURE — 83880 ASSAY OF NATRIURETIC PEPTIDE: CPT

## 2019-12-23 RX ORDER — BUMETANIDE 1 MG/1
1 TABLET ORAL DAILY
Status: DISCONTINUED | OUTPATIENT
Start: 2019-12-24 | End: 2019-12-24 | Stop reason: HOSPADM

## 2019-12-23 RX ADMIN — SPIRONOLACTONE 25 MG: 25 TABLET ORAL at 08:32

## 2019-12-23 RX ADMIN — FUROSEMIDE 40 MG: 10 INJECTION, SOLUTION INTRAMUSCULAR; INTRAVENOUS at 08:32

## 2019-12-23 RX ADMIN — IPRATROPIUM BROMIDE AND ALBUTEROL SULFATE 1 AMPULE: 2.5; .5 SOLUTION RESPIRATORY (INHALATION) at 16:57

## 2019-12-23 RX ADMIN — Medication 31 MILLICURIE: at 13:30

## 2019-12-23 RX ADMIN — APIXABAN 5 MG: 5 TABLET, FILM COATED ORAL at 20:07

## 2019-12-23 RX ADMIN — APIXABAN 5 MG: 5 TABLET, FILM COATED ORAL at 08:32

## 2019-12-23 RX ADMIN — REGADENOSON 0.4 MG: 0.08 INJECTION, SOLUTION INTRAVENOUS at 13:28

## 2019-12-23 RX ADMIN — METOPROLOL SUCCINATE 50 MG: 50 TABLET, EXTENDED RELEASE ORAL at 08:32

## 2019-12-23 RX ADMIN — SACUBITRIL AND VALSARTAN 1 TABLET: 24; 26 TABLET, FILM COATED ORAL at 20:07

## 2019-12-23 RX ADMIN — FUROSEMIDE 40 MG: 10 INJECTION, SOLUTION INTRAMUSCULAR; INTRAVENOUS at 18:32

## 2019-12-23 RX ADMIN — SACUBITRIL AND VALSARTAN 1 TABLET: 24; 26 TABLET, FILM COATED ORAL at 08:32

## 2019-12-23 RX ADMIN — IPRATROPIUM BROMIDE AND ALBUTEROL SULFATE 1 AMPULE: 2.5; .5 SOLUTION RESPIRATORY (INHALATION) at 19:59

## 2019-12-23 RX ADMIN — SODIUM CHLORIDE, PRESERVATIVE FREE 10 ML: 5 INJECTION INTRAVENOUS at 18:31

## 2019-12-23 RX ADMIN — SODIUM CHLORIDE, PRESERVATIVE FREE 10 ML: 5 INJECTION INTRAVENOUS at 08:32

## 2019-12-23 RX ADMIN — ALLOPURINOL 300 MG: 300 TABLET ORAL at 08:32

## 2019-12-23 RX ADMIN — SODIUM CHLORIDE, PRESERVATIVE FREE 10 ML: 5 INJECTION INTRAVENOUS at 20:07

## 2019-12-23 RX ADMIN — Medication 10 MILLICURIE: at 11:58

## 2019-12-23 ASSESSMENT — PAIN SCALES - GENERAL
PAINLEVEL_OUTOF10: 0

## 2019-12-24 VITALS
BODY MASS INDEX: 27.33 KG/M2 | OXYGEN SATURATION: 92 % | HEART RATE: 91 BPM | HEIGHT: 71 IN | RESPIRATION RATE: 20 BRPM | WEIGHT: 195.2 LBS | SYSTOLIC BLOOD PRESSURE: 128 MMHG | TEMPERATURE: 98 F | DIASTOLIC BLOOD PRESSURE: 84 MMHG

## 2019-12-24 PROCEDURE — 6370000000 HC RX 637 (ALT 250 FOR IP): Performed by: INTERNAL MEDICINE

## 2019-12-24 PROCEDURE — 2580000003 HC RX 258: Performed by: INTERNAL MEDICINE

## 2019-12-24 RX ORDER — METOPROLOL SUCCINATE 50 MG/1
50 TABLET, EXTENDED RELEASE ORAL DAILY
Qty: 30 TABLET | Refills: 3 | Status: ON HOLD | OUTPATIENT
Start: 2019-12-25 | End: 2021-01-07 | Stop reason: HOSPADM

## 2019-12-24 RX ORDER — SPIRONOLACTONE 25 MG/1
25 TABLET ORAL DAILY
Qty: 30 TABLET | Refills: 3 | Status: SHIPPED | OUTPATIENT
Start: 2019-12-25 | End: 2020-12-28

## 2019-12-24 RX ORDER — BUMETANIDE 1 MG/1
1 TABLET ORAL DAILY
Qty: 30 TABLET | Refills: 3 | Status: ON HOLD | OUTPATIENT
Start: 2019-12-25 | End: 2021-08-18 | Stop reason: HOSPADM

## 2019-12-24 RX ADMIN — SODIUM CHLORIDE, PRESERVATIVE FREE 10 ML: 5 INJECTION INTRAVENOUS at 08:33

## 2019-12-24 RX ADMIN — SPIRONOLACTONE 25 MG: 25 TABLET ORAL at 08:33

## 2019-12-24 RX ADMIN — APIXABAN 5 MG: 5 TABLET, FILM COATED ORAL at 08:33

## 2019-12-24 RX ADMIN — SACUBITRIL AND VALSARTAN 1 TABLET: 24; 26 TABLET, FILM COATED ORAL at 08:33

## 2019-12-24 RX ADMIN — BUMETANIDE 1 MG: 1 TABLET ORAL at 08:33

## 2019-12-24 RX ADMIN — METOPROLOL SUCCINATE 50 MG: 50 TABLET, EXTENDED RELEASE ORAL at 08:33

## 2019-12-24 RX ADMIN — PANTOPRAZOLE SODIUM 40 MG: 40 TABLET, DELAYED RELEASE ORAL at 05:34

## 2019-12-24 RX ADMIN — IPRATROPIUM BROMIDE AND ALBUTEROL SULFATE 1 AMPULE: 2.5; .5 SOLUTION RESPIRATORY (INHALATION) at 09:30

## 2019-12-24 RX ADMIN — ALLOPURINOL 300 MG: 300 TABLET ORAL at 08:33

## 2019-12-24 ASSESSMENT — PAIN SCALES - GENERAL
PAINLEVEL_OUTOF10: 0

## 2019-12-24 NOTE — CARE COORDINATION
Spoke to patient, his plan at discharge remains Home with no anticipated needs. CM will follow for any needs that arisre.  Sig other will provide transportation

## 2019-12-24 NOTE — DISCHARGE SUMMARY
cardiomegaly with normal pulmonary vascularity. Scattered bibasilar areas of opacification noted which may be chronic. No pleural fluid evident. Cardiomegaly without failure Basilar areas of parenchymal opacification possibly chronic     Cta Pulmonary W Contrast    Result Date: 2019  Patient MRN: [id number] : 1945 Age:  66 years Gender: Male Order Date: 2019 10:45 AM Exam: CTA PULMONARY W CONTRAST Number of Images: 449 views Indication:   pe pe Comparison: None. Findings: Scans are rapidly acquired during infusion of contrast with additional reformatted and mid images submitted as well. Mediastinal windows demonstrate no CT evidence for pulmonary embolism or other significant vascular abnormality. There are bilateral posterior pleural effusions right somewhat greater than left. No pericardial fluid identified. The heart is enlarged. No acute process evident in the visualized abdomen. There are scattered nonpathologic size mediastinal lymph nodes present. Pulmonary parenchymal windows demonstrate vague areas of groundglass opacification bilateral upper lungs most likely representing early pulmonary edema. Minimal areas of subsegmental atelectasis evident in the bases. No acute bony abnormality evident. No CT evidence for pulmonary embolism or other significant vascular abnormality Bilateral posterior pleural effusions Parenchymal findings suggesting early pulmonary edema       Discharge Exam:    HEENT: NCAT,  PERRLA, No JVD  Heart:  RRR, no murmurs, gallops, or rubs.   Lungs:  CTA bilaterally, no wheeze, rales or rhonchi  Abd: bowel sounds present, nontender, nondistended, no masses  Extrem:  No clubbing, cyanosis, or edema    Disposition: home     Patient Condition at Discharge: stable     Patient Instructions:      Medication List      ASK your doctor about these medications    allopurinol 300 MG tablet  Commonly known as:  ZYLOPRIM     ELIQUIS 5 MG Tabs tablet  Generic drug:  apixaban

## 2019-12-24 NOTE — PROGRESS NOTES
Answering service notified of new consult for Dr. Al Lim.
Subjective: The patient is awake and alert. No acute events overnight. Denies chest pain, angina, SOB     Objective:    BP (!) 142/83   Pulse 98   Temp 98.1 °F (36.7 °C) (Temporal)   Resp 18   Ht 5' 11\" (1.803 m)   Wt 195 lb 4.8 oz (88.6 kg)   SpO2 92%   BMI 27.24 kg/m²     In: 180 [P.O.:180]  Out: 500     HEENT: NCAT,  PERRLA, No JVD  Heart:  RRR, no murmurs, gallops, or rubs.   Lungs:  CTA bilaterally, no wheeze, rales or rhonchi  Abd: bowel sounds present, nontender, nondistended, no masses  Extrem:  No clubbing, cyanosis, or edema     Recent Labs     12/20/19  0948 12/21/19  0513   WBC 6.5 5.1   HGB 12.7 13.4   HCT 38.0 39.6    252       Recent Labs     12/21/19  0513 12/21/19  1444 12/22/19  0458    144 143   K 2.8* 3.4* 3.2*   CL 96* 96* 97*   CO2 25 31* 25   BUN 9 10 14   CREATININE 0.9 0.9 1.1   CALCIUM 8.9 9.5 8.9       Assessment:    Patient Active Problem List   Diagnosis    DVT (deep venous thrombosis) (HCC)    CHF with unknown LVEF (HCC)    Chest pain       Plan:    Admitted to [facility] for eval of acute sob / CHF   echo  Ef 35 to 40% - discussed with dr. Tanika Park    iv diuresis   Stress test tomorrow for ischemic work up   Supplement mg and k   consult cards for new chf / ischemic eval - spoke with dr. Yojana culver for ho dvt      DVT Prophylaxis   PT/OT  Discharge planning tomorrow If stress nromal     All consultants notes reviewed    Arely Van MD  3:41 PM  12/22/2019
embolism or other significant vascular abnormality Bilateral posterior pleural effusions Parenchymal findings suggesting early pulmonary edema     Nm Cardiac Stress Test Nuclear Imaging    Result Date: 2019  Patient MRN:  [id number] : 1945 Age: 66 years Gender: Male Order Date:  2019 4:45 PM EXAM: NM MYOCARDIAL SPECT REST EXERCISE OR RX Number of Images: 9 views INDICATION:  Chest pain Reason for Exam?->Chest pain Procedure Type->Rx COMPARISON: None TECHNIQUE: 10 mCi of Tc-99m MIBI was injected intravenously at rest and cardiac SPECT images were performed. In addition 31 mCi of Tc-99m MIBI was injected intravenously at maximum stress by using Lexiscan stress. Stress SPECT images and gated study were performed. FINDINGS: Perfusion images demonstrate no reversible perfusion defect. Wall motion is hypokinetic The end diastolic volume is 697 ml. The end systolic volume is 77 ml. The estimated ejection fraction is 28 %. 1. No reversible perfusion defect 2. Ejection fraction is 28 %. 3. Wall motion appears to be hypokinetic       EKG: See Report  Echo: See Report      IMPRESSIONS:  Principal Problem:    CHF with unknown LVEF ([facility])  Active Problems:    DVT (deep venous thrombosis) ([facility])    Chest pain  Resolved Problems:    Volume overload      RECOMMENDATIONS:  Mr. Seun Monreal confirmed his two-dimensional echocardiographic findings and that his left ventricular systolic function is markedly reduced yet there is no gross evidence for stress-induced ischemia. Likewise proBNP has significantly decreased since the time of admission with initiation of his new medical regimen. Thus will transition him to oral diuretic and monitor serum potassium and renal function and consider discharge tomorrow if stable. I will see him in follow-up in 3 months or sooner as clinically warranted.     I have spent more than 25 minutes face to face with Bette Steve and reviewing notes and laboratory data, with

## 2019-12-26 ENCOUNTER — CARE COORDINATION (OUTPATIENT)
Dept: CASE MANAGEMENT | Age: 74
End: 2019-12-26

## 2019-12-27 ENCOUNTER — CARE COORDINATION (OUTPATIENT)
Dept: CASE MANAGEMENT | Age: 74
End: 2019-12-27

## 2020-01-03 ENCOUNTER — CARE COORDINATION (OUTPATIENT)
Dept: CASE MANAGEMENT | Age: 75
End: 2020-01-03

## 2020-01-10 ENCOUNTER — CARE COORDINATION (OUTPATIENT)
Dept: CASE MANAGEMENT | Age: 75
End: 2020-01-10

## 2020-01-21 ENCOUNTER — CARE COORDINATION (OUTPATIENT)
Dept: CASE MANAGEMENT | Age: 75
End: 2020-01-21

## 2020-01-21 NOTE — CARE COORDINATION
[facility] Transitions Follow Up Call    2020    Patient: Ulysses Gonsales  Patient : 1945   MRN: <[id number]>   Discharge Date: 19 RARS: Readmission Risk Score: 11    Fifth attempt to contact patient for BPCI-Acall. Contact information left to  requesting call back at the earliest convenience. Joaquín Rodríguez RN BSN   Care Transitions Nurse  [phone]       Follow Up  No future appointments.     Joaquín Rodríguez RN

## 2020-02-04 ENCOUNTER — CARE COORDINATION (OUTPATIENT)
Dept: CASE MANAGEMENT | Age: 75
End: 2020-02-04

## 2020-02-26 ENCOUNTER — CARE COORDINATION (OUTPATIENT)
Dept: CASE MANAGEMENT | Age: 75
End: 2020-02-26

## 2020-02-26 NOTE — CARE COORDINATION
[facility] Transitions Follow Up Call    2020    Patient: Prasanna Conde  Patient : 1945   MRN: <[id number]>  Reason for Admission:   Discharge Date: 19 RARS: Readmission Risk Score: 11         Attempted to contact patient for follow up BPCI-A transition call. Left voicemail message to return call with an update on condition since discharge. Contact information provided. Will continue to follow up. Care Transitions Subsequent and Final Call    Subsequent and Final Calls  Care Transitions Interventions                          Other Interventions: Follow Up  No future appointments.     Arthuro Dandy, LPN

## 2020-03-06 ENCOUNTER — CARE COORDINATION (OUTPATIENT)
Dept: CASE MANAGEMENT | Age: 75
End: 2020-03-06

## 2020-03-20 ENCOUNTER — CARE COORDINATION (OUTPATIENT)
Dept: CASE MANAGEMENT | Age: 75
End: 2020-03-20

## 2020-12-28 ENCOUNTER — HOSPITAL ENCOUNTER (INPATIENT)
Age: 75
LOS: 10 days | Discharge: NURSING, REHAB OR LONG-TERM CARE | DRG: 871 | End: 2021-01-07
Attending: EMERGENCY MEDICINE | Admitting: FAMILY MEDICINE
Payer: MEDICARE

## 2020-12-28 ENCOUNTER — APPOINTMENT (OUTPATIENT)
Dept: GENERAL RADIOLOGY | Age: 75
DRG: 871 | End: 2020-12-28
Payer: MEDICARE

## 2020-12-28 DIAGNOSIS — J96.01 ACUTE HYPOXEMIC RESPIRATORY FAILURE DUE TO SEVERE ACUTE RESPIRATORY SYNDROME CORONAVIRUS 2 (SARS-COV-2) DISEASE (HCC): Primary | ICD-10-CM

## 2020-12-28 DIAGNOSIS — E86.0 DEHYDRATION: ICD-10-CM

## 2020-12-28 DIAGNOSIS — E87.6 HYPOKALEMIA: ICD-10-CM

## 2020-12-28 DIAGNOSIS — I82.493 DEEP VEIN THROMBOSIS (DVT) OF OTHER VEIN OF BOTH LOWER EXTREMITIES, UNSPECIFIED CHRONICITY (HCC): Chronic | ICD-10-CM

## 2020-12-28 DIAGNOSIS — N28.9 ACUTE RENAL INSUFFICIENCY: ICD-10-CM

## 2020-12-28 DIAGNOSIS — R74.01 TRANSAMINITIS: ICD-10-CM

## 2020-12-28 DIAGNOSIS — U07.1 ACUTE HYPOXEMIC RESPIRATORY FAILURE DUE TO SEVERE ACUTE RESPIRATORY SYNDROME CORONAVIRUS 2 (SARS-COV-2) DISEASE (HCC): Primary | ICD-10-CM

## 2020-12-28 PROBLEM — R50.9 FEVER AND CHILLS: Status: ACTIVE | Noted: 2020-12-28

## 2020-12-28 PROBLEM — R09.02 HYPOXIA: Status: ACTIVE | Noted: 2020-12-28

## 2020-12-28 PROBLEM — E83.42 HYPOMAGNESEMIA: Status: ACTIVE | Noted: 2020-12-28

## 2020-12-28 LAB
ALBUMIN SERPL-MCNC: 2.7 G/DL (ref 3.5–5.2)
ALBUMIN SERPL-MCNC: 3 G/DL (ref 3.5–5.2)
ALP BLD-CCNC: 43 U/L (ref 40–129)
ALP BLD-CCNC: 43 U/L (ref 40–129)
ALT SERPL-CCNC: 41 U/L (ref 0–40)
ALT SERPL-CCNC: 48 U/L (ref 0–40)
ANION GAP SERPL CALCULATED.3IONS-SCNC: 16 MMOL/L (ref 7–16)
APTT: 24.8 SEC (ref 24.5–35.1)
AST SERPL-CCNC: 126 U/L (ref 0–39)
AST SERPL-CCNC: 186 U/L (ref 0–39)
B.E.: -2.3 MMOL/L (ref -3–3)
BACTERIA: ABNORMAL /HPF
BASOPHILS ABSOLUTE: 0.01 E9/L (ref 0–0.2)
BASOPHILS RELATIVE PERCENT: 0.2 % (ref 0–2)
BILIRUB SERPL-MCNC: 0.3 MG/DL (ref 0–1.2)
BILIRUB SERPL-MCNC: 0.3 MG/DL (ref 0–1.2)
BILIRUBIN DIRECT: <0.2 MG/DL (ref 0–0.3)
BILIRUBIN URINE: ABNORMAL
BILIRUBIN, INDIRECT: ABNORMAL MG/DL (ref 0–1)
BLOOD, URINE: ABNORMAL
BUN BLDV-MCNC: 14 MG/DL (ref 8–23)
BUN BLDV-MCNC: 17 MG/DL (ref 8–23)
CALCIUM SERPL-MCNC: 7.2 MG/DL (ref 8.6–10.2)
CHLORIDE BLD-SCNC: 105 MMOL/L (ref 98–107)
CLARITY: CLEAR
CO2: 18 MMOL/L (ref 22–29)
COHB: 0.6 % (ref 0–1.5)
COLOR: YELLOW
CREAT SERPL-MCNC: 1.3 MG/DL (ref 0.7–1.2)
CRITICAL: ABNORMAL
D DIMER: 1009 NG/ML DDU
DATE ANALYZED: ABNORMAL
DATE OF COLLECTION: ABNORMAL
EKG ATRIAL RATE: 107 BPM
EKG P AXIS: 105 DEGREES
EKG P-R INTERVAL: 200 MS
EKG Q-T INTERVAL: 368 MS
EKG QRS DURATION: 112 MS
EKG QTC CALCULATION (BAZETT): 491 MS
EKG R AXIS: -37 DEGREES
EKG T AXIS: -15 DEGREES
EKG VENTRICULAR RATE: 107 BPM
EOSINOPHILS ABSOLUTE: 0 E9/L (ref 0.05–0.5)
EOSINOPHILS RELATIVE PERCENT: 0 % (ref 0–6)
FIBRINOGEN: 623 MG/DL (ref 225–540)
GFR AFRICAN AMERICAN: >60
GFR NON-AFRICAN AMERICAN: 53 ML/MIN/1.73
GLUCOSE BLD-MCNC: 122 MG/DL (ref 74–99)
GLUCOSE URINE: NEGATIVE MG/DL
HCO3: 20.8 MMOL/L (ref 22–26)
HCT VFR BLD CALC: 33.2 % (ref 37–54)
HEMOGLOBIN: 10.9 G/DL (ref 12.5–16.5)
HHB: 11.8 % (ref 0–5)
HYALINE CASTS: ABNORMAL /LPF (ref 0–2)
IMMATURE GRANULOCYTES #: 0.03 E9/L
IMMATURE GRANULOCYTES %: 0.6 % (ref 0–5)
INR BLD: 1
KETONES, URINE: NEGATIVE MG/DL
LAB: ABNORMAL
LACTIC ACID, SEPSIS: 1.5 MMOL/L (ref 0.5–1.9)
LACTIC ACID, SEPSIS: 3.6 MMOL/L (ref 0.5–1.9)
LEUKOCYTE ESTERASE, URINE: NEGATIVE
LIPASE: 67 U/L (ref 13–60)
LYMPHOCYTES ABSOLUTE: 0.41 E9/L (ref 1.5–4)
LYMPHOCYTES RELATIVE PERCENT: 8.7 % (ref 20–42)
Lab: ABNORMAL
MAGNESIUM: 1.2 MG/DL (ref 1.6–2.6)
MCH RBC QN AUTO: 34.4 PG (ref 26–35)
MCHC RBC AUTO-ENTMCNC: 32.8 % (ref 32–34.5)
MCV RBC AUTO: 104.7 FL (ref 80–99.9)
METHB: 0.4 % (ref 0–1.5)
MODE: ABNORMAL
MONOCYTES ABSOLUTE: 0.42 E9/L (ref 0.1–0.95)
MONOCYTES RELATIVE PERCENT: 8.9 % (ref 2–12)
NEUTROPHILS ABSOLUTE: 3.86 E9/L (ref 1.8–7.3)
NEUTROPHILS RELATIVE PERCENT: 81.6 % (ref 43–80)
NITRITE, URINE: NEGATIVE
O2 CONTENT: 21.9 ML/DL
O2 SATURATION: 88.1 % (ref 92–98.5)
O2HB: 87.2 % (ref 94–97)
OPERATOR ID: 3186
PATIENT TEMP: 37 C
PCO2: 32.1 MMHG (ref 35–45)
PDW BLD-RTO: 12.9 FL (ref 11.5–15)
PH BLOOD GAS: 7.43 (ref 7.35–7.45)
PH UA: 6 (ref 5–9)
PLATELET # BLD: 182 E9/L (ref 130–450)
PMV BLD AUTO: 10.4 FL (ref 7–12)
PO2: 56.8 MMHG (ref 75–100)
POTASSIUM REFLEX MAGNESIUM: 2.5 MMOL/L (ref 3.5–5)
PRO-BNP: 2583 PG/ML (ref 0–450)
PROTEIN UA: 100 MG/DL
PROTHROMBIN TIME: 12 SEC (ref 9.3–12.4)
RBC # BLD: 3.17 E12/L (ref 3.8–5.8)
RBC # BLD: NORMAL 10*6/UL
RBC UA: ABNORMAL /HPF (ref 0–2)
SARS-COV-2, NAAT: DETECTED
SODIUM BLD-SCNC: 139 MMOL/L (ref 132–146)
SOURCE, BLOOD GAS: ABNORMAL
SPECIFIC GRAVITY UA: 1.02 (ref 1–1.03)
THB: 17.9 G/DL (ref 11.5–16.5)
TIME ANALYZED: 947
TOTAL PROTEIN: 6.3 G/DL (ref 6.4–8.3)
TOTAL PROTEIN: 6.5 G/DL (ref 6.4–8.3)
TROPONIN: 0.03 NG/ML (ref 0–0.03)
UROBILINOGEN, URINE: 0.2 E.U./DL
WBC # BLD: 4.7 E9/L (ref 4.5–11.5)
WBC UA: ABNORMAL /HPF (ref 0–5)

## 2020-12-28 PROCEDURE — 6360000002 HC RX W HCPCS: Performed by: EMERGENCY MEDICINE

## 2020-12-28 PROCEDURE — 99223 1ST HOSP IP/OBS HIGH 75: CPT | Performed by: INTERNAL MEDICINE

## 2020-12-28 PROCEDURE — 2580000003 HC RX 258: Performed by: CLINICAL NURSE SPECIALIST

## 2020-12-28 PROCEDURE — 83735 ASSAY OF MAGNESIUM: CPT

## 2020-12-28 PROCEDURE — 83605 ASSAY OF LACTIC ACID: CPT

## 2020-12-28 PROCEDURE — 6370000000 HC RX 637 (ALT 250 FOR IP): Performed by: CLINICAL NURSE SPECIALIST

## 2020-12-28 PROCEDURE — 2700000000 HC OXYGEN THERAPY PER DAY

## 2020-12-28 PROCEDURE — 87040 BLOOD CULTURE FOR BACTERIA: CPT

## 2020-12-28 PROCEDURE — 6370000000 HC RX 637 (ALT 250 FOR IP): Performed by: EMERGENCY MEDICINE

## 2020-12-28 PROCEDURE — 85384 FIBRINOGEN ACTIVITY: CPT

## 2020-12-28 PROCEDURE — 73502 X-RAY EXAM HIP UNI 2-3 VIEWS: CPT

## 2020-12-28 PROCEDURE — 87449 NOS EACH ORGANISM AG IA: CPT

## 2020-12-28 PROCEDURE — 93005 ELECTROCARDIOGRAM TRACING: CPT | Performed by: EMERGENCY MEDICINE

## 2020-12-28 PROCEDURE — 36415 COLL VENOUS BLD VENIPUNCTURE: CPT

## 2020-12-28 PROCEDURE — 85610 PROTHROMBIN TIME: CPT

## 2020-12-28 PROCEDURE — 84484 ASSAY OF TROPONIN QUANT: CPT

## 2020-12-28 PROCEDURE — 80076 HEPATIC FUNCTION PANEL: CPT

## 2020-12-28 PROCEDURE — 81001 URINALYSIS AUTO W/SCOPE: CPT

## 2020-12-28 PROCEDURE — APPSS45 APP SPLIT SHARED TIME 31-45 MINUTES: Performed by: CLINICAL NURSE SPECIALIST

## 2020-12-28 PROCEDURE — 85730 THROMBOPLASTIN TIME PARTIAL: CPT

## 2020-12-28 PROCEDURE — 84520 ASSAY OF UREA NITROGEN: CPT

## 2020-12-28 PROCEDURE — 96365 THER/PROPH/DIAG IV INF INIT: CPT

## 2020-12-28 PROCEDURE — 94664 DEMO&/EVAL PT USE INHALER: CPT

## 2020-12-28 PROCEDURE — 80053 COMPREHEN METABOLIC PANEL: CPT

## 2020-12-28 PROCEDURE — 82805 BLOOD GASES W/O2 SATURATION: CPT

## 2020-12-28 PROCEDURE — 94640 AIRWAY INHALATION TREATMENT: CPT

## 2020-12-28 PROCEDURE — U0003 INFECTIOUS AGENT DETECTION BY NUCLEIC ACID (DNA OR RNA); SEVERE ACUTE RESPIRATORY SYNDROME CORONAVIRUS 2 (SARS-COV-2) (CORONAVIRUS DISEASE [COVID-19]), AMPLIFIED PROBE TECHNIQUE, MAKING USE OF HIGH THROUGHPUT TECHNOLOGIES AS DESCRIBED BY CMS-2020-01-R: HCPCS

## 2020-12-28 PROCEDURE — 71101 X-RAY EXAM UNILAT RIBS/CHEST: CPT

## 2020-12-28 PROCEDURE — 85025 COMPLETE CBC W/AUTO DIFF WBC: CPT

## 2020-12-28 PROCEDURE — 83880 ASSAY OF NATRIURETIC PEPTIDE: CPT

## 2020-12-28 PROCEDURE — 87088 URINE BACTERIA CULTURE: CPT

## 2020-12-28 PROCEDURE — 83690 ASSAY OF LIPASE: CPT

## 2020-12-28 PROCEDURE — 85378 FIBRIN DEGRADE SEMIQUANT: CPT

## 2020-12-28 PROCEDURE — 99285 EMERGENCY DEPT VISIT HI MDM: CPT

## 2020-12-28 PROCEDURE — U0002 COVID-19 LAB TEST NON-CDC: HCPCS

## 2020-12-28 PROCEDURE — 2060000000 HC ICU INTERMEDIATE R&B

## 2020-12-28 PROCEDURE — 2580000003 HC RX 258: Performed by: EMERGENCY MEDICINE

## 2020-12-28 RX ORDER — SODIUM CHLORIDE 0.9 % (FLUSH) 0.9 %
10 SYRINGE (ML) INJECTION PRN
Status: DISCONTINUED | OUTPATIENT
Start: 2020-12-28 | End: 2021-01-02

## 2020-12-28 RX ORDER — ALLOPURINOL 300 MG/1
300 TABLET ORAL DAILY
Status: DISCONTINUED | OUTPATIENT
Start: 2020-12-28 | End: 2021-01-07 | Stop reason: HOSPADM

## 2020-12-28 RX ORDER — MAGNESIUM SULFATE IN WATER 40 MG/ML
2 INJECTION, SOLUTION INTRAVENOUS ONCE
Status: COMPLETED | OUTPATIENT
Start: 2020-12-28 | End: 2020-12-28

## 2020-12-28 RX ORDER — SODIUM CHLORIDE 0.9 % (FLUSH) 0.9 %
10 SYRINGE (ML) INJECTION EVERY 12 HOURS SCHEDULED
Status: DISCONTINUED | OUTPATIENT
Start: 2020-12-28 | End: 2021-01-07 | Stop reason: HOSPADM

## 2020-12-28 RX ORDER — HYDROCODONE BITARTRATE AND ACETAMINOPHEN 7.5; 325 MG/1; MG/1
1 TABLET ORAL EVERY 6 HOURS PRN
Status: DISCONTINUED | OUTPATIENT
Start: 2020-12-28 | End: 2021-01-07 | Stop reason: HOSPADM

## 2020-12-28 RX ORDER — POLYETHYLENE GLYCOL 3350 17 G/17G
17 POWDER, FOR SOLUTION ORAL DAILY PRN
Status: DISCONTINUED | OUTPATIENT
Start: 2020-12-28 | End: 2021-01-07 | Stop reason: HOSPADM

## 2020-12-28 RX ORDER — METOPROLOL SUCCINATE 25 MG/1
50 TABLET, EXTENDED RELEASE ORAL DAILY
Status: DISCONTINUED | OUTPATIENT
Start: 2020-12-28 | End: 2020-12-31

## 2020-12-28 RX ORDER — DEXAMETHASONE 4 MG/1
6 TABLET ORAL DAILY
Status: DISCONTINUED | OUTPATIENT
Start: 2020-12-29 | End: 2021-01-07 | Stop reason: HOSPADM

## 2020-12-28 RX ORDER — ACETAMINOPHEN 650 MG/1
650 SUPPOSITORY RECTAL EVERY 6 HOURS PRN
Status: DISCONTINUED | OUTPATIENT
Start: 2020-12-28 | End: 2021-01-07 | Stop reason: HOSPADM

## 2020-12-28 RX ORDER — ACETAMINOPHEN 325 MG/1
650 TABLET ORAL EVERY 6 HOURS PRN
Status: DISCONTINUED | OUTPATIENT
Start: 2020-12-28 | End: 2021-01-07 | Stop reason: HOSPADM

## 2020-12-28 RX ORDER — 0.9 % SODIUM CHLORIDE 0.9 %
1000 INTRAVENOUS SOLUTION INTRAVENOUS ONCE
Status: COMPLETED | OUTPATIENT
Start: 2020-12-28 | End: 2020-12-28

## 2020-12-28 RX ORDER — POTASSIUM CHLORIDE 20 MEQ/1
40 TABLET, EXTENDED RELEASE ORAL ONCE
Status: COMPLETED | OUTPATIENT
Start: 2020-12-28 | End: 2020-12-28

## 2020-12-28 RX ORDER — PANTOPRAZOLE SODIUM 40 MG/1
40 TABLET, DELAYED RELEASE ORAL
Status: DISCONTINUED | OUTPATIENT
Start: 2020-12-29 | End: 2020-12-31

## 2020-12-28 RX ORDER — ONDANSETRON 2 MG/ML
4 INJECTION INTRAMUSCULAR; INTRAVENOUS EVERY 6 HOURS PRN
Status: DISCONTINUED | OUTPATIENT
Start: 2020-12-28 | End: 2021-01-07 | Stop reason: HOSPADM

## 2020-12-28 RX ORDER — DEXAMETHASONE SODIUM PHOSPHATE 10 MG/ML
6 INJECTION, SOLUTION INTRAMUSCULAR; INTRAVENOUS EVERY 24 HOURS
Status: DISCONTINUED | OUTPATIENT
Start: 2020-12-29 | End: 2020-12-28 | Stop reason: SDUPTHER

## 2020-12-28 RX ORDER — ACETAMINOPHEN 500 MG
1000 TABLET ORAL ONCE
Status: COMPLETED | OUTPATIENT
Start: 2020-12-28 | End: 2020-12-28

## 2020-12-28 RX ORDER — POTASSIUM CHLORIDE 7.45 MG/ML
10 INJECTION INTRAVENOUS ONCE
Status: COMPLETED | OUTPATIENT
Start: 2020-12-28 | End: 2020-12-28

## 2020-12-28 RX ORDER — DEXAMETHASONE 4 MG/1
6 TABLET ORAL ONCE
Status: COMPLETED | OUTPATIENT
Start: 2020-12-28 | End: 2020-12-28

## 2020-12-28 RX ORDER — IPRATROPIUM BROMIDE AND ALBUTEROL SULFATE 2.5; .5 MG/3ML; MG/3ML
3 SOLUTION RESPIRATORY (INHALATION) ONCE
Status: COMPLETED | OUTPATIENT
Start: 2020-12-28 | End: 2020-12-28

## 2020-12-28 RX ORDER — PROMETHAZINE HYDROCHLORIDE 25 MG/1
12.5 TABLET ORAL EVERY 6 HOURS PRN
Status: DISCONTINUED | OUTPATIENT
Start: 2020-12-28 | End: 2021-01-07 | Stop reason: HOSPADM

## 2020-12-28 RX ORDER — LOPERAMIDE HYDROCHLORIDE 2 MG/1
2 CAPSULE ORAL 4 TIMES DAILY PRN
Status: DISCONTINUED | OUTPATIENT
Start: 2020-12-28 | End: 2021-01-07 | Stop reason: HOSPADM

## 2020-12-28 RX ADMIN — MAGNESIUM SULFATE 2 G: 2 INJECTION INTRAVENOUS at 13:41

## 2020-12-28 RX ADMIN — DEXAMETHASONE 6 MG: 4 TABLET ORAL at 13:40

## 2020-12-28 RX ADMIN — POTASSIUM CHLORIDE 10 MEQ: 7.46 INJECTION, SOLUTION INTRAVENOUS at 11:25

## 2020-12-28 RX ADMIN — HYDROCODONE BITARTRATE AND ACETAMINOPHEN 1 TABLET: 7.5; 325 TABLET ORAL at 22:20

## 2020-12-28 RX ADMIN — IPRATROPIUM BROMIDE AND ALBUTEROL SULFATE 3 AMPULE: .5; 3 SOLUTION RESPIRATORY (INHALATION) at 10:23

## 2020-12-28 RX ADMIN — ALLOPURINOL 300 MG: 300 TABLET ORAL at 16:18

## 2020-12-28 RX ADMIN — SODIUM CHLORIDE 1000 ML: 9 INJECTION, SOLUTION INTRAVENOUS at 13:40

## 2020-12-28 RX ADMIN — Medication 10 ML: at 21:39

## 2020-12-28 RX ADMIN — POTASSIUM CHLORIDE 40 MEQ: 20 TABLET, EXTENDED RELEASE ORAL at 11:25

## 2020-12-28 RX ADMIN — HYDROCODONE BITARTRATE AND ACETAMINOPHEN 1 TABLET: 7.5; 325 TABLET ORAL at 16:18

## 2020-12-28 RX ADMIN — METOPROLOL SUCCINATE 50 MG: 25 TABLET, EXTENDED RELEASE ORAL at 16:18

## 2020-12-28 RX ADMIN — PANCRELIPASE 36000 UNITS: 60000; 12000; 38000 CAPSULE, DELAYED RELEASE PELLETS ORAL at 16:18

## 2020-12-28 RX ADMIN — ACETAMINOPHEN 1000 MG: 500 TABLET ORAL at 10:16

## 2020-12-28 RX ADMIN — APIXABAN 5 MG: 5 TABLET, FILM COATED ORAL at 21:38

## 2020-12-28 ASSESSMENT — ENCOUNTER SYMPTOMS
VISUAL CHANGE: 0
DIARRHEA: 0
SORE THROAT: 0
EYE REDNESS: 0
ABDOMINAL PAIN: 0
NAUSEA: 0
HEMATOCHEZIA: 0
BACK PAIN: 0
EYE PAIN: 0
COUGH: 1
WHEEZING: 0
VOMITING: 0
SINUS PRESSURE: 0
SHORTNESS OF BREATH: 1
EYE DISCHARGE: 0

## 2020-12-28 ASSESSMENT — PAIN DESCRIPTION - LOCATION: LOCATION: GENERALIZED

## 2020-12-28 ASSESSMENT — PAIN DESCRIPTION - ORIENTATION: ORIENTATION: LEFT

## 2020-12-28 ASSESSMENT — PAIN - FUNCTIONAL ASSESSMENT: PAIN_FUNCTIONAL_ASSESSMENT: PREVENTS OR INTERFERES SOME ACTIVE ACTIVITIES AND ADLS

## 2020-12-28 ASSESSMENT — PAIN SCALES - GENERAL
PAINLEVEL_OUTOF10: 3
PAINLEVEL_OUTOF10: 6
PAINLEVEL_OUTOF10: 6

## 2020-12-28 ASSESSMENT — PAIN DESCRIPTION - FREQUENCY: FREQUENCY: CONTINUOUS

## 2020-12-28 ASSESSMENT — PAIN DESCRIPTION - PAIN TYPE: TYPE: ACUTE PAIN

## 2020-12-28 NOTE — PROGRESS NOTES
Database complete. Medications reconciled. Care plans and education initiated. Known to Dr. Josh Quiñonez for Cardiology.

## 2020-12-28 NOTE — H&P
[facility] History and Physical      CHIEF COMPLAINT: Weakness fatigue left hip and rib pain. Hypoxia    History of Present Illness: This is a 73-year-old  man with a past medical history of acute on chronic congestive heart failure with reduced ejection fraction, chest pain, chronic lower leg DVT, alcohol use, edema. Patient admitted via ER coming from home. EMS services were called earlier today patient has been having several day onset of weakness fatigue cough shortness of breath poor appetite change in taste. He does consumes several alcoholic beverages a day and also mentions decrease in his oral intake and poor hydration. He does live with partner named Tiny Mates who reportedly has not been sick but the 2 of them have been visiting store shopping. They deny any significant knowledge of contact with sick people. At any rate was noted in the field he was hypoxic on room air in the 80 percentile and he was complaining generalized malaise body aches left rib and hip pain. He denied any loss of consciousness falls or traumatic injuries. Upon arrival to the ER chest x-ray did read negative for rib fracture but noted multifocal pneumonia in the upper lobe. COVID-19 test was positive. Hip imaging unremarkable for fractures. Labs did show concern for dehydration potassium was 2.5, BUN 14, creatinine 1.3, magnesium 1.2. Otherwise white blood cell count 4.7 hemoglobin 10.9 platelets 802. Also of note BNP was 2583 and he did have some mildly elevated transaminases. Urine and blood cultures were sent. Blood gases did show a pH of 7.42, PCO2 32, PO2 56, bicarb 20     Patient was given 1 L bolus of normal saline, he was given Tylenol for fever, started on dexamethasone 6 mg once. Magnesium and potassium were replaced. Patient being admitted. PPE precautions.     Informant(s) for H&P: Patient, medical chart, ER report    REVIEW OF SYSTEMS:  A comprehensive review of systems was negative except for: what is in the HPI  Patient + fever + chills, no headache or blurry vision, no shortness of breath no cough, no palpitation, no chest pain, patient denied any abdominal pain no nausea or vomiting no diarrhea constipation, no hemoptysis or hematemesis, no melena or hematochezia. + weakness, fatigue, hip and rib pain    PMH:  Past Medical History:   Diagnosis Date    CHF (congestive heart failure) ([facility] [address]     Gout        Surgical History:  History reviewed. No pertinent surgical history. Colonoscopy 2/15/2018  Right index finger biopsy synovial cyst 1/16/2017    Medications Prior to Admission:    Prior to Admission medications    Medication Sig Start Date End Date Taking? Authorizing Provider   bumetanide (BUMEX) 1 MG tablet Take 1 tablet by mouth daily  Patient taking differently: Take 1 mg by mouth daily as needed PT STATES ALL THIS MEDICINE DOES IS MAKE YOU PEE. TAKES WHEN HE THINKS HE NEEDS IT. 12/25/19  Yes Luis Lomas MD   metoprolol succinate (TOPROL XL) 50 MG extended release tablet Take 1 tablet by mouth daily 12/25/19  Yes Luis Lomas MD   sacubitril-valsartan (ENTRESTO) 24-26 MG per tablet Take 1 tablet by mouth 2 times daily 12/24/19  Yes Luis Lomas MD   allopurinol (ZYLOPRIM) 300 MG tablet Take 300 mg by mouth daily   Yes Historical Provider, MD   apixaban (ELIQUIS) 5 MG TABS tablet Take 5 mg by mouth 2 times daily   Yes Historical Provider, MD   HYDROcodone-acetaminophen (NORCO) 7.5-325 MG per tablet Take 1 tablet by mouth every 6 hours as needed for Pain. Yes Historical Provider, MD   omeprazole (PRILOSEC) 20 MG delayed release capsule Take 20 mg by mouth daily   Yes Historical Provider, MD   lipase-protease-amylase (CREON) 36175 units CPEP delayed release capsule Take by mouth as needed TAKES AFTER MEALS PRN.     Historical Provider, MD   loperamide (IMODIUM) 2 MG capsule Take 2 mg by mouth every 4-6 hours as needed for Diarrhea    Historical Provider, MD Allergies:    Levofloxacin    Social History:    reports that he has never smoked. He has never used smokeless tobacco. He reports current alcohol use. He reports that he does not use drugs. Patient is not , no children, , retired vocational counselor. Denies any current tobacco use or drug use. He has an occasional 1-2 drinks either wine or vodka a day. He has house that he lives in here in the [facility]. He lives with a partner Kim Woods. Family History:   family history includes Breast Cancer in his mother; No Known Problems in his brother, sister, and sister.    Mother- around age 80-unknown cause  Father- around age 80 unknown cause  -Patient denies any contributory history of hypertension, hyperlipidemia cancers      PHYSICAL EXAM:  Vitals:  BP (!) 150/81   Pulse 85   Temp 98.8 °F (37.1 °C) (Oral)   Resp 18   Ht 6' (1.829 m)   Wt 200 lb (90.7 kg)   SpO2 97%   BMI 27.12 kg/m²     General Appearance: alert and oriented to person, place and time and in no acute distress  Skin: warm and dry  Head: normocephalic and atraumatic  Eyes: pupils equal, round, and reactive to light, extraocular eye movements intact, conjunctivae normal  Neck: neck supple and non tender without mass   Pulmonary/Chest: clear to auscultation bilaterally- no wheezes, rales or rhonchi, normal air movement, no respiratory distress  Cardiovascular: normal rate, normal S1 and S2 and no carotid bruits  Abdomen: soft, non-tender, softly distended, large, normal bowel sounds, no masses or organomegaly  Extremities: no cyanosis, no clubbing and no edema  Neurologic: no cranial nerve deficit and speech normal        LABS:  Recent Labs     20  0847      K 2.5*      CO2 18*   BUN 14   CREATININE 1.3*   GLUCOSE 122*   CALCIUM 7.2*       Recent Labs     20  0847   WBC 4.7   RBC 3.17*   HGB 10.9*   HCT 33.2*   .7*   MCH 34.4   MCHC 32.8   RDW 12.9      MPV 10.4 No results for input(s): POCGLU in the last 72 hours. 12/28/2020 12:23 PM - Jeffry, Mhy Incoming Results From [facility]         Component Value Ref Range & Units Status Collected Lab   SARS-CoV-2, NAAT DETECTEDAbnormal   Not Detected Final 12/28/2020 11:55 AM  - [facility]       BNP 2583  Troponin 0 0.03  Lipase 67  Urinalysis rare bacteria  Lact acid 3.6  Urine culture sent  Culture sent      Radiology:   XR HIP 2-3 VW W PELVIS LEFT   Final Result   1. Negative for fracture or acute osseous abnormality. 2.  Chronic findings, as above      XR RIBS LEFT INCLUDE CHEST (MIN 3 VIEWS)   Final Result   1. There is no appreciable left rib fracture or pneumothorax   2. Multifocal pneumonia more prominent within the right upper lobe. EKG:   EKG 12/28/2020 at 10:57 AM  Sinus rhythm with premature atrial complexes and premature ventricular complexes  Left axis deviation  No previous ECGs available  Confirmed by Fouzia Wetzel ([id number]) on 12/28/2020 10:57:16 AM    ASSESSMENT:      Active Problems:    DVT (deep venous thrombosis) ([facility])    CHF with unknown LVEF (H[facility])    COVID-19    Hypokalemia    Hypomagnesemia    Fever and chills    Hypoxia    Acute hypoxemic respiratory failure due to severe acute respiratory syndrome coronavirus 2 (SARS-CoV-2) disease ([facility] [address]  Resolved Problems:    * No resolved hospital problems. *      PLAN:    1. Acute hypoxic respiratory failure-secondary to COVID-19  -Came in via EMS apparently on room air hypoxic 88% in the field currently on 2 L nasal cannula.   Onset of weakness fatigue left rib and hip pain    -Chest x-ray right multifocal pneumonia  -We will also send urine strep Legionella antigen pro-Phu blood and urine cultures  -Started on Decadron in ER  -We will add zinc, vitamin D, vitamin C  -Send COVID-19 labs today        Fever-continue supportive care  -Tylenol      Lactic acidosis-3.6   -Continue with hydration      Acute kidney injury -secondary to dehydration. Also in the setting of alcohol consumption and COVID-19  -BUN 14, creatinine 1.3  -Give IV fluid bolus in the ER 1 L      Hypokalemia -admission potassium 2.5  -Supplemented in the ER with 40 M EQ potassium p.o. along with intravenous potassium 10 M EQ  -Daily labs      Hypomagnesia  -Admission magnesium 1.2  -ER supplementation 2 g of mag sulfate  -Daily labs      Elevated transaminases, elevated lipase is 67  -Admission ALT 41,   -Monitor trend  -He is on Creon and will continue        Sinus tachycardia  -Likely reactive to probable dehydration  -EKG          Alcohol use  -Monitor for withdrawals, was noted to have been drinking the day before coming in      History of acute on chronic congestive heart failure with reduced EF  -2D echo [facility] 12/2019 shows EF of 40%  -Admission BNP 2583, troponin 0 0.03.  1 year ago his BNP was 746  -Home regimen includes Aldactone, Bumex, Entresto and Toprol-XL  (Going to hold diuretics and Entresto for now)  -Setting of ESTUARDO will hold diuresis  -Known to cardiology Dr. Gurwinder Knutson--- we will reconsult        History of lower leg DVT  -Continue Eliquis          Code Status: Full code  DVT prophylaxis: Eliquis  Diet-low-sodium      NOTE: This report was transcribed using voice recognition software. Every effort was made to ensure accuracy; however, inadvertent computerized transcription errors may be present.   Electronically signed by ROSALBA Sofia on 12/28/2020 at 2:16 PM

## 2020-12-28 NOTE — ED NOTES
Bed: 22  Expected date:   Expected time:   Means of arrival:   Comments:  CAITY/dee Sena RN  12/28/20 [id number]

## 2020-12-28 NOTE — ED PROVIDER NOTES
Patient states he had been drinking alcohol last night and making it very. This morning, he became very weak while standing at the counter and partially fell. EMS states when they arrived on the scene, he was holding himself on the counter with his elbows and his legs are not position. But, he did not fall to the floor. He did not strike his head. No head neck or back pain. He complains of some pain to his left ribs where they were on the counter. Also some pain to his left hip. He complains of generalized weakness as well. The history is provided by the patient. Fatigue  Severity:  Moderate  Onset quality:  Gradual  Duration:  1 day  Timing:  Constant  Chronicity:  New  Context: alcohol use    Relieved by:  None tried  Worsened by:  Nothing  Ineffective treatments:  None tried  Associated symptoms: cough and shortness of breath    Associated symptoms: no abdominal pain, no anorexia, no aphasia, no arthralgias, no ataxia, no chest pain, no diarrhea, no difficulty walking, no dizziness, no drooling, no dysphagia, no dysuria, no numbness in extremities, no fever, no foul-smelling urine, no frequency, no headaches, no hematochezia, no lethargy, no loss of consciousness, no melena, no myalgias, no nausea, no near-syncope, no seizures, no sensory-motor deficit, no stroke symptoms, no syncope, no urgency, no vision change and no vomiting         Review of Systems   Constitutional: Positive for activity change, appetite change, chills and fatigue. Negative for fever. HENT: Negative for drooling, ear pain, sinus pressure and sore throat. Eyes: Negative for pain, discharge and redness. Respiratory: Positive for cough and shortness of breath. Negative for wheezing. Cardiovascular: Negative for chest pain, syncope and near-syncope. Gastrointestinal: Negative for abdominal pain, anorexia, diarrhea, dysphagia, hematochezia, melena, nausea and vomiting.    Genitourinary: Negative for dysuria, frequency and urgency. Musculoskeletal: Negative for arthralgias, back pain and myalgias. Skin: Negative for rash and wound. Neurological: Negative for dizziness, seizures, loss of consciousness, weakness and headaches. Hematological: Negative for adenopathy. All other systems reviewed and are negative. Physical Exam  Vitals signs and nursing note reviewed. Constitutional:       Appearance: He is well-developed. HENT:      Head: Normocephalic and atraumatic. Eyes:      Pupils: Pupils are equal, round, and reactive to light. Neck:      Musculoskeletal: Normal range of motion and neck supple. Cardiovascular:      Rate and Rhythm: Regular rhythm. Tachycardia present. Heart sounds: Normal heart sounds. No murmur. Pulmonary:      Effort: Pulmonary effort is normal. No respiratory distress. Breath sounds: Wheezing present. No rales. Chest:      Comments: Tenderness palpation to the left lower rib cage, mid clavicular. Abdominal:      General: Bowel sounds are normal.      Palpations: Abdomen is soft. Tenderness: There is no abdominal tenderness. There is no guarding or rebound. Musculoskeletal:         General: Tenderness present. Left hip: He exhibits tenderness. Skin:     General: Skin is warm and dry. Neurological:      Mental Status: He is alert and oriented to person, place, and time. Cranial Nerves: No cranial nerve deficit. Coordination: Coordination normal.          Procedures     MDM             Review of his chart as follows:    12/21/19  Echo   Summary   Ejection fraction is visually estimated at 40-45%. LV diastolic dysfunction   Trileaflet aortic valve. Normal leaflet mobility. No aortic stenosis. No aortic regurgitation. The left atrium is mildly dilated. Mildly dilated aortic root. Physiologic and/or trace mitral regurgitation is present. No evidence of hemodynamically significant mitral stenosis.    No evidence for hemodynamically significant pericardial effusion. Normal right ventricle structure and function. Normal tricuspid valve structure and function. EKG: This EKG is signed and interpreted by me. Rate: 107  Rhythm: Sinus  Interpretation: no acute changes and non-specific EKG  Comparison: stable as compared to patient's most recent EKG           --------------------------------------------- PAST HISTORY ---------------------------------------------  Past Medical History:  has no past medical history on file. Past Surgical History:  has no past surgical history on file. Social History:  reports that he has never smoked. He has never used smokeless tobacco. He reports current alcohol use. He reports that he does not use drugs. Family History: family history is not on file. The patients home medications have been reviewed. Allergies: Patient has no known allergies.     -------------------------------------------------- RESULTS -------------------------------------------------    Lab  Results for orders placed or performed during the hospital encounter of 12/28/20   Lactate, Sepsis   Result Value Ref Range    Lactic Acid, Sepsis 3.6 (H) 0.5 - 1.9 mmol/L   CBC auto differential   Result Value Ref Range    WBC 4.7 4.5 - 11.5 E9/L    RBC 3.17 (L) 3.80 - 5.80 E12/L    Hemoglobin 10.9 (L) 12.5 - 16.5 g/dL    Hematocrit 33.2 (L) 37.0 - 54.0 %    .7 (H) 80.0 - 99.9 fL    MCH 34.4 26.0 - 35.0 pg    MCHC 32.8 32.0 - 34.5 %    RDW 12.9 11.5 - 15.0 fL    Platelets 981 322 - 043 E9/L    MPV 10.4 7.0 - 12.0 fL    Neutrophils % 81.6 (H) 43.0 - 80.0 %    Immature Granulocytes % 0.6 0.0 - 5.0 %    Lymphocytes % 8.7 (L) 20.0 - 42.0 %    Monocytes % 8.9 2.0 - 12.0 %    Eosinophils % 0.0 0.0 - 6.0 %    Basophils % 0.2 0.0 - 2.0 %    Neutrophils Absolute 3.86 1.80 - 7.30 E9/L    Immature Granulocytes # 0.03 E9/L    Lymphocytes Absolute 0.41 (L) 1.50 - 4.00 E9/L    Monocytes Absolute 0.42 0.10 - 0.95 E9/L    Eosinophils 11.5 - 16.5 g/dL    Mode RA     Date Of Collection      Time Collected      Pt Temp 37.0 C     ID [id number]     Lab [id number]     Critical(s) Notified . No Critical Values    Covid-19 Ambulatory   Result Value Ref Range    Source OP swab    Microscopic Urinalysis   Result Value Ref Range    Hyaline Casts, UA 0-2 0 - 2 /LPF    WBC, UA 0-1 0 - 5 /HPF    RBC, UA 1-3 0 - 2 /HPF    Bacteria, UA RARE (A) None Seen /HPF   COVID-19   Result Value Ref Range    SARS-CoV-2, NAAT DETECTED (A) Not Detected   EKG 12 lead   Result Value Ref Range    Ventricular Rate 107 BPM    Atrial Rate 107 BPM    P-R Interval 200 ms    QRS Duration 112 ms    Q-T Interval 368 ms    QTc Calculation (Bazett) 491 ms    P Axis 105 degrees    R Axis -37 degrees    T Axis -15 degrees       Radiology  XR HIP 2-3 VW W PELVIS LEFT   Final Result   1. Negative for fracture or acute osseous abnormality. 2.  Chronic findings, as above      XR RIBS LEFT INCLUDE CHEST (MIN 3 VIEWS)   Final Result   1. There is no appreciable left rib fracture or pneumothorax   2. Multifocal pneumonia more prominent within the right upper lobe.            ------------------------- NURSING NOTES AND VITALS REVIEWED ---------------------------  Date / Time Roomed:  12/28/2020  8:04 AM  ED Bed Assignment:  22/22    The nursing notes within the ED encounter and vital signs as below have been reviewed.    Patient Vitals for the past 24 hrs:   BP Temp Temp src Pulse Resp SpO2 Height Weight   12/28/20 1145 108/78 -- -- 97 -- 97 % -- --   12/28/20 1117 106/78 -- -- 108 20 95 % -- --   12/28/20 1015 (!) 152/71 -- -- 105 -- 91 % -- --   12/28/20 1000 -- -- -- -- -- 90 % -- --   12/28/20 0921 -- 100.4 °F (38 °C) Oral 115 20 90 % -- --   12/28/20 0816 (!) 162/84 100.6 °F (38.1 °C) Oral 120 18 90 % 6' (1.829 m) 200 lb (90.7 kg)       Oxygen Saturation Interpretation: Abnormal      ------------------------------------------ PROGRESS NOTES ------------------------------------------  Re-evaluation(s):  Time: 1220. Patients symptoms show no change  Repeat physical examination is not changed    Time: 1140. Patients symptoms show no change  Repeat physical examination is not changed    I have spoken with the patient and discussed todays results, in addition to providing specific details for the plan of care and counseling regarding the diagnosis and prognosis. Their questions are answered at this time and they are agreeable with the plan.      --------------------------------- ADDITIONAL PROVIDER NOTES ---------------------------------  Consultations:  Spoke with Dr. Agnieszka Gee,  They will admit this patient. This patient's ED course included: a personal history and physicial examination, multiple bedside re-evaluations, IV medications, cardiac monitoring and continuous pulse oximetry    This patient has remained hemodynamically stable during their ED course. Please note that the withdrawal or failure to initiate urgent interventions for this patient would likely result in a life threatening deterioration or permanent disability. Accordingly this patient received 34 minutes of critical care time, excluding separately billable procedures. Clinical Impression  1. Acute hypoxemic respiratory failure due to severe acute respiratory syndrome coronavirus 2 (SARS-CoV-2) disease ([facility] [address]    2. Hypokalemia    3. Acute renal insufficiency    4. Dehydration    5. Transaminitis          Disposition  Patient's disposition: Admit  Patient's condition is stable.        Mamta Lott DO  12/28/20 [id number]

## 2020-12-28 NOTE — PROGRESS NOTES
Addendum  I have personally participated in the history, exam, medical decision making with Mahesh Powell NP on the date of service, I have personally reviewed imaging and lab data as well as EKG and I agree with all of the pertinent clinical information unless otherwise noted. I have also reviewed and agree with the past medical, family, and social history unless otherwise noted. 76M w PMH HFrEF, HTN, past LLE DVT, gout presented to ED w weakness and hypoxia, fell, had L rib and hip pain. COVID positive, CXR w multifocal pna. Pt charted at 90% on RA at the lowest.  Admitted for further monitoring and tx. Physical Exam  BP (!) 150/81   Pulse 85   Temp 98.8 °F (37.1 °C) (Oral)   Resp 18   Ht 6' (1.829 m)   Wt 200 lb (90.7 kg)   SpO2 97%   BMI 27.12 kg/m²   Lungs are clear to auscultation bilaterally no crackles no wheezing. Heart normal S1-S2. Abdomen soft nontender nondistended positive bowel sounds. Extremities no peripheral edema.     Assessment / Plan  COVID w questionable hypoxia   Charted 90% at the lowest, currently on 2L   LFTs elevated - follow on CMP   COVID positive   Decadron / vitC / vitD / Zn   Check strep / Legionella Ag, blood / urine / resp cx    On Eliquis for DVT ppx    Chronic HFrEF   CXR noted   Follow volume status   On Bumex / Aldactone / Enstresto / Dirk Orlin w Dr. Audi Pena - consult    Chronic LLE DVT - continue home Eliquis    HypoK / HypoMg - replete / monitor    ESTUARDO - baseline Cr ~1.0, 1.3 in ED, likely pre-renal, follow on IVF    Lactic acidosis - 3.6 in ED, follow on IVF    Electronically signed by Ramirez Astorga DO on 12/28/2020 at 3:05 PM

## 2020-12-29 ENCOUNTER — APPOINTMENT (OUTPATIENT)
Dept: GENERAL RADIOLOGY | Age: 75
DRG: 871 | End: 2020-12-29
Payer: MEDICARE

## 2020-12-29 LAB
ALBUMIN SERPL-MCNC: 3.4 G/DL (ref 3.5–5.2)
ALP BLD-CCNC: 46 U/L (ref 40–129)
ALT SERPL-CCNC: 55 U/L (ref 0–40)
ANION GAP SERPL CALCULATED.3IONS-SCNC: 12 MMOL/L (ref 7–16)
AST SERPL-CCNC: 214 U/L (ref 0–39)
BASOPHILS ABSOLUTE: 0.01 E9/L (ref 0–0.2)
BASOPHILS RELATIVE PERCENT: 0.1 % (ref 0–2)
BILIRUB SERPL-MCNC: 0.2 MG/DL (ref 0–1.2)
BUN BLDV-MCNC: 16 MG/DL (ref 8–23)
CALCIUM SERPL-MCNC: 8.6 MG/DL (ref 8.6–10.2)
CHLORIDE BLD-SCNC: 100 MMOL/L (ref 98–107)
CO2: 23 MMOL/L (ref 22–29)
CREAT SERPL-MCNC: 1 MG/DL (ref 0.7–1.2)
EOSINOPHILS ABSOLUTE: 0 E9/L (ref 0.05–0.5)
EOSINOPHILS RELATIVE PERCENT: 0 % (ref 0–6)
GFR AFRICAN AMERICAN: >60
GFR NON-AFRICAN AMERICAN: >60 ML/MIN/1.73
GLUCOSE BLD-MCNC: 158 MG/DL (ref 74–99)
HCT VFR BLD CALC: 35 % (ref 37–54)
HEMOGLOBIN: 11.7 G/DL (ref 12.5–16.5)
IMMATURE GRANULOCYTES #: 0.06 E9/L
IMMATURE GRANULOCYTES %: 0.8 % (ref 0–5)
L. PNEUMOPHILA SEROGP 1 UR AG: NORMAL
LACTIC ACID: 1.8 MMOL/L (ref 0.5–2.2)
LYMPHOCYTES ABSOLUTE: 0.5 E9/L (ref 1.5–4)
LYMPHOCYTES RELATIVE PERCENT: 6.9 % (ref 20–42)
MAGNESIUM: 1.8 MG/DL (ref 1.6–2.6)
MCH RBC QN AUTO: 34.4 PG (ref 26–35)
MCHC RBC AUTO-ENTMCNC: 33.4 % (ref 32–34.5)
MCV RBC AUTO: 102.9 FL (ref 80–99.9)
METER GLUCOSE: 107 MG/DL (ref 74–99)
MONOCYTES ABSOLUTE: 0.26 E9/L (ref 0.1–0.95)
MONOCYTES RELATIVE PERCENT: 3.6 % (ref 2–12)
NEUTROPHILS ABSOLUTE: 6.38 E9/L (ref 1.8–7.3)
NEUTROPHILS RELATIVE PERCENT: 88.6 % (ref 43–80)
PDW BLD-RTO: 12.6 FL (ref 11.5–15)
PLATELET # BLD: 230 E9/L (ref 130–450)
PMV BLD AUTO: 10.7 FL (ref 7–12)
POTASSIUM REFLEX MAGNESIUM: 3.5 MMOL/L (ref 3.5–5)
RBC # BLD: 3.4 E12/L (ref 3.8–5.8)
RBC # BLD: NORMAL 10*6/UL
SARS-COV-2: DETECTED
SODIUM BLD-SCNC: 135 MMOL/L (ref 132–146)
SOURCE: ABNORMAL
STREP PNEUMONIAE ANTIGEN, URINE: NORMAL
TOTAL PROTEIN: 7.2 G/DL (ref 6.4–8.3)
WBC # BLD: 7.2 E9/L (ref 4.5–11.5)

## 2020-12-29 PROCEDURE — 85025 COMPLETE CBC W/AUTO DIFF WBC: CPT

## 2020-12-29 PROCEDURE — 36415 COLL VENOUS BLD VENIPUNCTURE: CPT

## 2020-12-29 PROCEDURE — 6370000000 HC RX 637 (ALT 250 FOR IP): Performed by: CLINICAL NURSE SPECIALIST

## 2020-12-29 PROCEDURE — 2000000000 HC ICU R&B

## 2020-12-29 PROCEDURE — APPSS30 APP SPLIT SHARED TIME 16-30 MINUTES: Performed by: CLINICAL NURSE SPECIALIST

## 2020-12-29 PROCEDURE — 2500000003 HC RX 250 WO HCPCS: Performed by: INTERNAL MEDICINE

## 2020-12-29 PROCEDURE — 85378 FIBRIN DEGRADE SEMIQUANT: CPT

## 2020-12-29 PROCEDURE — 2580000003 HC RX 258: Performed by: CLINICAL NURSE SPECIALIST

## 2020-12-29 PROCEDURE — 2700000000 HC OXYGEN THERAPY PER DAY

## 2020-12-29 PROCEDURE — 71045 X-RAY EXAM CHEST 1 VIEW: CPT

## 2020-12-29 PROCEDURE — 94660 CPAP INITIATION&MGMT: CPT

## 2020-12-29 PROCEDURE — 2580000003 HC RX 258: Performed by: INTERNAL MEDICINE

## 2020-12-29 PROCEDURE — 83735 ASSAY OF MAGNESIUM: CPT

## 2020-12-29 PROCEDURE — 80053 COMPREHEN METABOLIC PANEL: CPT

## 2020-12-29 PROCEDURE — XW033E5 INTRODUCTION OF REMDESIVIR ANTI-INFECTIVE INTO PERIPHERAL VEIN, PERCUTANEOUS APPROACH, NEW TECHNOLOGY GROUP 5: ICD-10-PCS | Performed by: INTERNAL MEDICINE

## 2020-12-29 PROCEDURE — 85384 FIBRINOGEN ACTIVITY: CPT

## 2020-12-29 PROCEDURE — 85027 COMPLETE CBC AUTOMATED: CPT

## 2020-12-29 PROCEDURE — 6360000002 HC RX W HCPCS: Performed by: CLINICAL NURSE SPECIALIST

## 2020-12-29 PROCEDURE — 82962 GLUCOSE BLOOD TEST: CPT

## 2020-12-29 PROCEDURE — 6360000002 HC RX W HCPCS: Performed by: INTERNAL MEDICINE

## 2020-12-29 PROCEDURE — 99233 SBSQ HOSP IP/OBS HIGH 50: CPT | Performed by: INTERNAL MEDICINE

## 2020-12-29 PROCEDURE — 83605 ASSAY OF LACTIC ACID: CPT

## 2020-12-29 RX ORDER — METHYLPREDNISOLONE SODIUM SUCCINATE 125 MG/2ML
60 INJECTION, POWDER, LYOPHILIZED, FOR SOLUTION INTRAMUSCULAR; INTRAVENOUS ONCE
Status: DISCONTINUED | OUTPATIENT
Start: 2020-12-29 | End: 2020-12-30

## 2020-12-29 RX ORDER — FUROSEMIDE 10 MG/ML
60 INJECTION INTRAMUSCULAR; INTRAVENOUS ONCE
Status: COMPLETED | OUTPATIENT
Start: 2020-12-29 | End: 2020-12-29

## 2020-12-29 RX ORDER — METOPROLOL TARTRATE 5 MG/5ML
5 INJECTION INTRAVENOUS ONCE
Status: COMPLETED | OUTPATIENT
Start: 2020-12-30 | End: 2020-12-29

## 2020-12-29 RX ORDER — HYDRALAZINE HYDROCHLORIDE 20 MG/ML
5 INJECTION INTRAMUSCULAR; INTRAVENOUS ONCE
Status: COMPLETED | OUTPATIENT
Start: 2020-12-30 | End: 2020-12-29

## 2020-12-29 RX ORDER — 0.9 % SODIUM CHLORIDE 0.9 %
30 INTRAVENOUS SOLUTION INTRAVENOUS PRN
Status: DISCONTINUED | OUTPATIENT
Start: 2020-12-29 | End: 2021-01-07 | Stop reason: HOSPADM

## 2020-12-29 RX ADMIN — PANCRELIPASE 36000 UNITS: 60000; 12000; 38000 CAPSULE, DELAYED RELEASE PELLETS ORAL at 12:30

## 2020-12-29 RX ADMIN — HYDRALAZINE HYDROCHLORIDE 5 MG: 20 INJECTION INTRAMUSCULAR; INTRAVENOUS at 23:41

## 2020-12-29 RX ADMIN — DEXAMETHASONE 6 MG: 4 TABLET ORAL at 08:18

## 2020-12-29 RX ADMIN — ALLOPURINOL 300 MG: 300 TABLET ORAL at 08:17

## 2020-12-29 RX ADMIN — Medication 10 ML: at 08:17

## 2020-12-29 RX ADMIN — PANCRELIPASE 36000 UNITS: 60000; 12000; 38000 CAPSULE, DELAYED RELEASE PELLETS ORAL at 08:17

## 2020-12-29 RX ADMIN — PANTOPRAZOLE SODIUM 40 MG: 40 TABLET, DELAYED RELEASE ORAL at 06:00

## 2020-12-29 RX ADMIN — METOPROLOL TARTRATE 5 MG: 5 INJECTION INTRAVENOUS at 23:40

## 2020-12-29 RX ADMIN — HYDROCODONE BITARTRATE AND ACETAMINOPHEN 1 TABLET: 7.5; 325 TABLET ORAL at 20:42

## 2020-12-29 RX ADMIN — FUROSEMIDE 60 MG: 10 INJECTION, SOLUTION INTRAMUSCULAR; INTRAVENOUS at 23:45

## 2020-12-29 RX ADMIN — METOPROLOL SUCCINATE 50 MG: 25 TABLET, EXTENDED RELEASE ORAL at 08:17

## 2020-12-29 RX ADMIN — HYDROCODONE BITARTRATE AND ACETAMINOPHEN 1 TABLET: 7.5; 325 TABLET ORAL at 14:37

## 2020-12-29 RX ADMIN — HYDROCODONE BITARTRATE AND ACETAMINOPHEN 1 TABLET: 7.5; 325 TABLET ORAL at 08:17

## 2020-12-29 RX ADMIN — SODIUM CHLORIDE, PRESERVATIVE FREE 10 ML: 5 INJECTION INTRAVENOUS at 17:49

## 2020-12-29 RX ADMIN — APIXABAN 5 MG: 5 TABLET, FILM COATED ORAL at 20:42

## 2020-12-29 RX ADMIN — Medication 10 ML: at 20:43

## 2020-12-29 RX ADMIN — APIXABAN 5 MG: 5 TABLET, FILM COATED ORAL at 08:17

## 2020-12-29 RX ADMIN — REMDESIVIR 200 MG: 100 INJECTION, POWDER, LYOPHILIZED, FOR SOLUTION INTRAVENOUS at 17:49

## 2020-12-29 ASSESSMENT — PAIN - FUNCTIONAL ASSESSMENT: PAIN_FUNCTIONAL_ASSESSMENT: PREVENTS OR INTERFERES SOME ACTIVE ACTIVITIES AND ADLS

## 2020-12-29 ASSESSMENT — PAIN SCALES - GENERAL
PAINLEVEL_OUTOF10: 7

## 2020-12-29 ASSESSMENT — PAIN DESCRIPTION - ORIENTATION: ORIENTATION: RIGHT;LEFT

## 2020-12-29 ASSESSMENT — PAIN DESCRIPTION - ONSET: ONSET: ON-GOING

## 2020-12-29 ASSESSMENT — PAIN DESCRIPTION - PAIN TYPE: TYPE: CHRONIC PAIN

## 2020-12-29 ASSESSMENT — PAIN DESCRIPTION - DESCRIPTORS: DESCRIPTORS: ACHING;DISCOMFORT

## 2020-12-29 ASSESSMENT — PAIN DESCRIPTION - LOCATION: LOCATION: GENERALIZED

## 2020-12-29 NOTE — PROGRESS NOTES
Pulse ox was 92% on room air at rest.   Ambulated patient on room air ~ 20ft. Oxygen saturation was 76%, , RR 24 on room air while ambulating. 4L oxygen applied. Recovery pulse ox was 91% on, , RR 22, on 4 liters of oxygen while ambulating ~20ft.

## 2020-12-29 NOTE — PROGRESS NOTES
[facility] Quality Flow/Interdisciplinary Rounds Progress Note        Quality Flow Rounds held on December 29, 2020    Disciplines Attending:  Bedside Nurse,  and     Beckymadie Abbott was admitted on 12/28/2020  8:04 AM    Anticipated Discharge Date:  Expected Discharge Date: 12/31/20    Disposition:    Michael Score:  Michael Scale Score: 20    Readmission Risk              Risk of Unplanned Readmission:        16           Discussed patient goal for the day, patient clinical progression, and barriers to discharge. The following Goal(s) of the Day/Commitment(s) have been identified:  wean oxygen.       Conor Gil  December 29, 2020

## 2020-12-29 NOTE — CARE COORDINATION
COVID POSITIVE 12/28. Phone conversation w/ patient. Explained role of  and plan of care. Lives w/ male friend Donovan iKrk in a 2 story house. Independent PTA. No Hx DMEs, HHC, or [facility]. On Eliquis at home. PCP is Dr. Deborah Jim[facility]nez and pharmacy is [facility]. Currently requiring O2 2lNC- DME list offered and declined- no preference if home O2 is needed on discharge- tentative referral made to [address] [place] [address],[address] @ [facility]- will need O2 testing/order if unable to wean off.  Will follow Daniella Cowart

## 2020-12-29 NOTE — PROGRESS NOTES
[facility] Progress Note    Admitting Date and Time: 12/28/2020  8:04 AM  Admit Dx: Acute hypoxemic respiratory failure due to severe acute respiratory syndrome coronavirus 2 (SARS-CoV-2) disease ([facility]) [U07.1, J96.01]    Subjective:  Patient is being followed for Acute hypoxemic respiratory failure due to severe acute respiratory syndrome coronavirus 2 (SARS-CoV-2) disease ([address] [U07.1, J96.01]   Pt feels pretty well he stated sitting up at the bedside, but flat affect. Fair appetite and intake change of taste is poor. Reports no nausea vomiting diarrhea no fever chills. On 1-2 L nasal cannula oxygen now intermittent shortness of breath but no significant distress  Per RN: No adverse reactions    ROS: denies fever, chills, cp, less sob, n/v, HA unless stated above.       allopurinol  300 mg Oral Daily    apixaban  5 mg Oral BID    lipase-protease-amylase  36,000 Units Oral TID WC    metoprolol succinate  50 mg Oral Daily    pantoprazole  40 mg Oral QAM AC    sodium chloride flush  10 mL Intravenous 2 times per day    dexamethasone  6 mg Oral Daily         HYDROcodone-acetaminophen, 1 tablet, Q6H PRN      loperamide, 2 mg, 4x Daily PRN      sodium chloride flush, 10 mL, PRN      promethazine, 12.5 mg, Q6H PRN    Or      ondansetron, 4 mg, Q6H PRN      polyethylene glycol, 17 g, Daily PRN      acetaminophen, 650 mg, Q6H PRN    Or      acetaminophen, 650 mg, Q6H PRN         Objective:    BP (!) 129/90   Pulse 88   Temp 98.3 °F (36.8 °C) (Oral)   Resp 18   Ht 6' (1.829 m)   Wt 201 lb (91.2 kg)   SpO2 95%   BMI 27.26 kg/m²     General Appearance: alert and oriented to person, place and time and in no acute distress  Skin: warm and dry  Head: normocephalic and atraumatic  Eyes: pupils equal, round, and reactive to light, extraocular eye movements intact, conjunctivae normal  Neck: neck supple and non tender without mass   Pulmonary/Chest: clear to auscultation bilaterally- no wheezes, rales or rhonchi, normal air movement, no respiratory distress  1-2 L nasal cannula oxygen  Cardiovascular: normal rate, normal S1 and S2 and no carotid bruits  Abdomen: soft, non-tender, non-distended, normal bowel sounds, no masses or organomegaly  Extremities: no cyanosis, no clubbing and no edema  Neurologic: no cranial nerve deficit and speech normal  Hep-Lock      Recent Labs     12/28/20  0847 12/28/20  1331     --    K 2.5*  --      --    CO2 18*  --    BUN 14 17   CREATININE 1.3*  --    GLUCOSE 122*  --    CALCIUM 7.2*  --        Recent Labs     12/28/20  0847   WBC 4.7   RBC 3.17*   HGB 10.9*   HCT 33.2*   .7*   MCH 34.4   MCHC 32.8   RDW 12.9      MPV 10.4       12/28/2020 12:23 PM - Jeffry, Mhy Incoming Results From [facility]                    Component Value Ref Range & Units Status Collected Lab   SARS-CoV-2, NAAT DETECTEDAbnormal   Not Detected Final 12/28/2020 11:55 AM  - [id number]  [facility]      12/28/2020  BNP 2583  Troponin 0 0.03  Lipase 67  Urinalysis rare bacteria  Lact acid 3.6  Urine culture sent  Culture sent    D-dimer 1009  Fibrinogen 623  INR 1.0  Lactic acid 1.5    Urine strep Legionella negative      Pending CRP, procalcitonin and interleukin-1     Radiology:   XR HIP 2-3 VW W PELVIS LEFT   Final Result   1. Negative for fracture or acute osseous abnormality. 2.  Chronic findings, as above       XR RIBS LEFT INCLUDE CHEST (MIN 3 VIEWS)   Final Result   1. There is no appreciable left rib fracture or pneumothorax   2. Multifocal pneumonia more prominent within the right upper lobe.              EKG:   EKG 12/28/2020 at 10:57 AM  Sinus rhythm with premature atrial complexes and premature ventricular complexes  Left axis deviation  No previous ECGs available  Confirmed by Antonia Callejas ([id number]) on 12/28/2020 10:57:16 AM      Assessment:    Active Problems:    DVT (deep venous thrombosis) ([facility]    CHF with unknown LVEF ([facility]    COVID-19 ESTUARDO will hold diuresis  -Known to cardiology Dr. Izzy Betancur--- we will reconsult           History of lower leg DVT  -Continue Eliquis        A.m. labs 12/29/2020 pending  We will order 12/30/2020 lab     Code Status: Full code  DVT prophylaxis: Eliquis  Diet-low-sodium    Disposition back to home when ready with medical team and other consults, will follow room air and nasal cannula oxygen saturations.         NOTE: This report was transcribed using voice recognition software. Every effort was made to ensure accuracy; however, inadvertent computerized transcription errors may be present.   Electronically signed by ROSALBA Eli on 12/29/2020 at 9:08 AM

## 2020-12-30 LAB
AADO2: 297.3 MMHG
AADO2: 550.3 MMHG
ALBUMIN SERPL-MCNC: 3.1 G/DL (ref 3.5–5.2)
ALP BLD-CCNC: 48 U/L (ref 40–129)
ALT SERPL-CCNC: 52 U/L (ref 0–40)
AMMONIA: 22.9 UMOL/L (ref 16–60)
ANION GAP SERPL CALCULATED.3IONS-SCNC: 15 MMOL/L (ref 7–16)
AST SERPL-CCNC: 167 U/L (ref 0–39)
B.E.: -1.3 MMOL/L (ref -3–3)
B.E.: -2.2 MMOL/L (ref -3–3)
BILIRUB SERPL-MCNC: 0.3 MG/DL (ref 0–1.2)
BUN BLDV-MCNC: 17 MG/DL (ref 8–23)
C-REACTIVE PROTEIN: 5.4 MG/DL (ref 0–0.4)
CALCIUM SERPL-MCNC: 8.5 MG/DL (ref 8.6–10.2)
CHLORIDE BLD-SCNC: 101 MMOL/L (ref 98–107)
CO2: 23 MMOL/L (ref 22–29)
COHB: 0.3 % (ref 0–1.5)
COHB: 0.4 % (ref 0–1.5)
CREAT SERPL-MCNC: 1.2 MG/DL (ref 0.7–1.2)
CRITICAL: ABNORMAL
CRITICAL: ABNORMAL
D DIMER: 1304 NG/ML DDU
DATE ANALYZED: ABNORMAL
DATE ANALYZED: ABNORMAL
DATE OF COLLECTION: ABNORMAL
DATE OF COLLECTION: ABNORMAL
FERRITIN: 205 NG/ML
FIBRINOGEN: 668 MG/DL (ref 225–540)
FIO2: 100 %
FIO2: 60 %
GFR AFRICAN AMERICAN: >60
GFR NON-AFRICAN AMERICAN: 59 ML/MIN/1.73
GLUCOSE BLD-MCNC: 138 MG/DL (ref 74–99)
HCO3: 21.6 MMOL/L (ref 22–26)
HCO3: 21.9 MMOL/L (ref 22–26)
HCT VFR BLD CALC: 36.9 % (ref 37–54)
HEMOGLOBIN: 12.3 G/DL (ref 12.5–16.5)
HHB: 2.2 % (ref 0–5)
HHB: 4.5 % (ref 0–5)
L. PNEUMOPHILA SEROGP 1 UR AG: NORMAL
LAB: ABNORMAL
LAB: ABNORMAL
LACTIC ACID: 2.2 MMOL/L (ref 0.5–2.2)
LV EF: 28 %
LVEF MODALITY: NORMAL
Lab: ABNORMAL
Lab: ABNORMAL
MAGNESIUM: 1.5 MG/DL (ref 1.6–2.6)
MCH RBC QN AUTO: 34.7 PG (ref 26–35)
MCHC RBC AUTO-ENTMCNC: 33.3 % (ref 32–34.5)
MCV RBC AUTO: 104.2 FL (ref 80–99.9)
METHB: 0.3 % (ref 0–1.5)
METHB: 0.3 % (ref 0–1.5)
MODE: ABNORMAL
MODE: ABNORMAL
O2 CONTENT: 17.4 ML/DL
O2 CONTENT: 18.6 ML/DL
O2 SATURATION: 95.5 % (ref 92–98.5)
O2 SATURATION: 97.8 % (ref 92–98.5)
O2HB: 94.8 % (ref 94–97)
O2HB: 97.2 % (ref 94–97)
OPERATOR ID: 3114
OPERATOR ID: ABNORMAL
PATIENT TEMP: 37 C
PATIENT TEMP: 37 C
PCO2: 32.2 MMHG (ref 35–45)
PCO2: 34 MMHG (ref 35–45)
PDW BLD-RTO: 12.7 FL (ref 11.5–15)
PEEP/CPAP: 10 CMH2O
PEEP/CPAP: 10 CMH2O
PFO2: 1.04 MMHG/%
PFO2: 1.33 MMHG/%
PH BLOOD GAS: 7.42 (ref 7.35–7.45)
PH BLOOD GAS: 7.45 (ref 7.35–7.45)
PHOSPHORUS: 2.8 MG/DL (ref 2.5–4.5)
PIP: 18 CMH2O
PIP: 18 CMH2O
PLATELET # BLD: 257 E9/L (ref 130–450)
PMV BLD AUTO: 11 FL (ref 7–12)
PO2: 103.7 MMHG (ref 75–100)
PO2: 80.1 MMHG (ref 75–100)
POTASSIUM SERPL-SCNC: 3.4 MMOL/L (ref 3.5–5)
PRO-BNP: ABNORMAL PG/ML (ref 0–450)
RBC # BLD: 3.54 E12/L (ref 3.8–5.8)
REASON FOR REJECTION: NORMAL
REJECTED TEST: NORMAL
RI(T): 371 %
RI(T): 531 %
SODIUM BLD-SCNC: 139 MMOL/L (ref 132–146)
SOURCE, BLOOD GAS: ABNORMAL
SOURCE, BLOOD GAS: ABNORMAL
STREP PNEUMONIAE ANTIGEN, URINE: NORMAL
THB: 13 G/DL (ref 11.5–16.5)
THB: 13.5 G/DL (ref 11.5–16.5)
TIME ANALYZED: 1355
TIME ANALYZED: 36
TOTAL PROTEIN: 7.1 G/DL (ref 6.4–8.3)
TROPONIN: 0.12 NG/ML (ref 0–0.03)
TROPONIN: 0.13 NG/ML (ref 0–0.03)
TROPONIN: 0.15 NG/ML (ref 0–0.03)
URINE CULTURE, ROUTINE: NORMAL
WBC # BLD: 6.8 E9/L (ref 4.5–11.5)

## 2020-12-30 PROCEDURE — 6360000002 HC RX W HCPCS: Performed by: INTERNAL MEDICINE

## 2020-12-30 PROCEDURE — 99291 CRITICAL CARE FIRST HOUR: CPT | Performed by: INTERNAL MEDICINE

## 2020-12-30 PROCEDURE — 2580000003 HC RX 258: Performed by: INTERNAL MEDICINE

## 2020-12-30 PROCEDURE — 2700000000 HC OXYGEN THERAPY PER DAY

## 2020-12-30 PROCEDURE — 84100 ASSAY OF PHOSPHORUS: CPT

## 2020-12-30 PROCEDURE — 87040 BLOOD CULTURE FOR BACTERIA: CPT

## 2020-12-30 PROCEDURE — 6360000004 HC RX CONTRAST MEDICATION: Performed by: INTERNAL MEDICINE

## 2020-12-30 PROCEDURE — 94660 CPAP INITIATION&MGMT: CPT

## 2020-12-30 PROCEDURE — 83735 ASSAY OF MAGNESIUM: CPT

## 2020-12-30 PROCEDURE — 2000000000 HC ICU R&B

## 2020-12-30 PROCEDURE — 83880 ASSAY OF NATRIURETIC PEPTIDE: CPT

## 2020-12-30 PROCEDURE — 82728 ASSAY OF FERRITIN: CPT

## 2020-12-30 PROCEDURE — 6370000000 HC RX 637 (ALT 250 FOR IP): Performed by: CLINICAL NURSE SPECIALIST

## 2020-12-30 PROCEDURE — 86140 C-REACTIVE PROTEIN: CPT

## 2020-12-30 PROCEDURE — 2500000003 HC RX 250 WO HCPCS: Performed by: INTERNAL MEDICINE

## 2020-12-30 PROCEDURE — 87449 NOS EACH ORGANISM AG IA: CPT

## 2020-12-30 PROCEDURE — 82805 BLOOD GASES W/O2 SATURATION: CPT

## 2020-12-30 PROCEDURE — 36600 WITHDRAWAL OF ARTERIAL BLOOD: CPT

## 2020-12-30 PROCEDURE — 6360000002 HC RX W HCPCS: Performed by: STUDENT IN AN ORGANIZED HEALTH CARE EDUCATION/TRAINING PROGRAM

## 2020-12-30 PROCEDURE — 84484 ASSAY OF TROPONIN QUANT: CPT

## 2020-12-30 PROCEDURE — 80053 COMPREHEN METABOLIC PANEL: CPT

## 2020-12-30 PROCEDURE — 87081 CULTURE SCREEN ONLY: CPT

## 2020-12-30 PROCEDURE — 82140 ASSAY OF AMMONIA: CPT

## 2020-12-30 PROCEDURE — 83520 IMMUNOASSAY QUANT NOS NONAB: CPT

## 2020-12-30 PROCEDURE — 94640 AIRWAY INHALATION TREATMENT: CPT

## 2020-12-30 PROCEDURE — 93306 TTE W/DOPPLER COMPLETE: CPT

## 2020-12-30 PROCEDURE — 2580000003 HC RX 258: Performed by: CLINICAL NURSE SPECIALIST

## 2020-12-30 PROCEDURE — 36415 COLL VENOUS BLD VENIPUNCTURE: CPT

## 2020-12-30 RX ORDER — LORAZEPAM 1 MG/1
3 TABLET ORAL
Status: DISCONTINUED | OUTPATIENT
Start: 2020-12-30 | End: 2021-01-07 | Stop reason: HOSPADM

## 2020-12-30 RX ORDER — BUMETANIDE 0.25 MG/ML
1 INJECTION, SOLUTION INTRAMUSCULAR; INTRAVENOUS DAILY
Status: DISCONTINUED | OUTPATIENT
Start: 2020-12-30 | End: 2021-01-03

## 2020-12-30 RX ORDER — LANOLIN ALCOHOL/MO/W.PET/CERES
100 CREAM (GRAM) TOPICAL DAILY
Status: DISCONTINUED | OUTPATIENT
Start: 2020-12-30 | End: 2020-12-31

## 2020-12-30 RX ORDER — LORAZEPAM 1 MG/1
4 TABLET ORAL
Status: DISCONTINUED | OUTPATIENT
Start: 2020-12-30 | End: 2021-01-07 | Stop reason: HOSPADM

## 2020-12-30 RX ORDER — MAGNESIUM SULFATE IN WATER 40 MG/ML
2 INJECTION, SOLUTION INTRAVENOUS ONCE
Status: COMPLETED | OUTPATIENT
Start: 2020-12-30 | End: 2020-12-30

## 2020-12-30 RX ORDER — POTASSIUM CHLORIDE 7.45 MG/ML
10 INJECTION INTRAVENOUS
Status: COMPLETED | OUTPATIENT
Start: 2020-12-30 | End: 2020-12-30

## 2020-12-30 RX ORDER — POTASSIUM CHLORIDE 7.45 MG/ML
10 INJECTION INTRAVENOUS PRN
Status: DISCONTINUED | OUTPATIENT
Start: 2020-12-30 | End: 2021-01-02

## 2020-12-30 RX ORDER — LORAZEPAM 2 MG/ML
4 INJECTION INTRAMUSCULAR
Status: DISCONTINUED | OUTPATIENT
Start: 2020-12-30 | End: 2021-01-07 | Stop reason: HOSPADM

## 2020-12-30 RX ORDER — LORAZEPAM 2 MG/ML
2 INJECTION INTRAMUSCULAR
Status: DISCONTINUED | OUTPATIENT
Start: 2020-12-30 | End: 2021-01-07 | Stop reason: HOSPADM

## 2020-12-30 RX ORDER — LORAZEPAM 1 MG/1
2 TABLET ORAL
Status: DISCONTINUED | OUTPATIENT
Start: 2020-12-30 | End: 2021-01-07 | Stop reason: HOSPADM

## 2020-12-30 RX ORDER — FOLIC ACID 1 MG/1
1 TABLET ORAL DAILY
Status: DISCONTINUED | OUTPATIENT
Start: 2020-12-30 | End: 2021-01-07 | Stop reason: HOSPADM

## 2020-12-30 RX ORDER — LORAZEPAM 1 MG/1
1 TABLET ORAL
Status: DISCONTINUED | OUTPATIENT
Start: 2020-12-30 | End: 2021-01-07 | Stop reason: HOSPADM

## 2020-12-30 RX ORDER — SODIUM CHLORIDE 9 MG/ML
INJECTION, SOLUTION INTRAVENOUS EVERY 8 HOURS
Status: DISCONTINUED | OUTPATIENT
Start: 2020-12-30 | End: 2021-01-06

## 2020-12-30 RX ORDER — CLOPIDOGREL BISULFATE 75 MG/1
75 TABLET ORAL DAILY
Status: DISCONTINUED | OUTPATIENT
Start: 2020-12-30 | End: 2021-01-07 | Stop reason: HOSPADM

## 2020-12-30 RX ORDER — LORAZEPAM 2 MG/ML
3 INJECTION INTRAMUSCULAR
Status: DISCONTINUED | OUTPATIENT
Start: 2020-12-30 | End: 2021-01-07 | Stop reason: HOSPADM

## 2020-12-30 RX ORDER — LORAZEPAM 2 MG/ML
1 INJECTION INTRAMUSCULAR
Status: DISCONTINUED | OUTPATIENT
Start: 2020-12-30 | End: 2021-01-07 | Stop reason: HOSPADM

## 2020-12-30 RX ADMIN — VANCOMYCIN HYDROCHLORIDE 750 MG: 1 INJECTION, POWDER, LYOPHILIZED, FOR SOLUTION INTRAVENOUS at 14:09

## 2020-12-30 RX ADMIN — PERFLUTREN 1.65 MG: 6.52 INJECTION, SUSPENSION INTRAVENOUS at 15:00

## 2020-12-30 RX ADMIN — DEXMEDETOMIDINE HYDROCHLORIDE 0.2 MCG/KG/HR: 100 INJECTION, SOLUTION INTRAVENOUS at 13:30

## 2020-12-30 RX ADMIN — PIPERACILLIN AND TAZOBACTAM 3.38 G: 3; .375 INJECTION, POWDER, LYOPHILIZED, FOR SOLUTION INTRAVENOUS at 02:58

## 2020-12-30 RX ADMIN — BUMETANIDE 1 MG: 0.25 INJECTION, SOLUTION INTRAMUSCULAR; INTRAVENOUS at 14:11

## 2020-12-30 RX ADMIN — POTASSIUM CHLORIDE 10 MEQ: 7.46 INJECTION, SOLUTION INTRAVENOUS at 04:05

## 2020-12-30 RX ADMIN — VANCOMYCIN HYDROCHLORIDE 1000 MG: 1 INJECTION, POWDER, LYOPHILIZED, FOR SOLUTION INTRAVENOUS at 02:56

## 2020-12-30 RX ADMIN — MAGNESIUM SULFATE HEPTAHYDRATE 2 G: 40 INJECTION, SOLUTION INTRAVENOUS at 09:40

## 2020-12-30 RX ADMIN — PIPERACILLIN AND TAZOBACTAM 3.38 G: 3; .375 INJECTION, POWDER, LYOPHILIZED, FOR SOLUTION INTRAVENOUS at 12:26

## 2020-12-30 RX ADMIN — POTASSIUM CHLORIDE 10 MEQ: 7.46 INJECTION, SOLUTION INTRAVENOUS at 06:30

## 2020-12-30 RX ADMIN — DORNASE ALFA 2.5 MG: 1 SOLUTION RESPIRATORY (INHALATION) at 02:30

## 2020-12-30 RX ADMIN — PHENOBARBITAL SODIUM 260 MG: 65 INJECTION INTRAMUSCULAR at 11:58

## 2020-12-30 RX ADMIN — ACETAMINOPHEN 650 MG: 650 SUPPOSITORY RECTAL at 18:05

## 2020-12-30 RX ADMIN — DEXMEDETOMIDINE HYDROCHLORIDE 0.2 MCG/KG/HR: 100 INJECTION, SOLUTION INTRAVENOUS at 02:58

## 2020-12-30 RX ADMIN — REMDESIVIR 100 MG: 100 INJECTION, POWDER, LYOPHILIZED, FOR SOLUTION INTRAVENOUS at 20:07

## 2020-12-30 RX ADMIN — PIPERACILLIN AND TAZOBACTAM 3.38 G: 3; .375 INJECTION, POWDER, LYOPHILIZED, FOR SOLUTION INTRAVENOUS at 18:57

## 2020-12-30 RX ADMIN — Medication 10 ML: at 11:32

## 2020-12-30 RX ADMIN — LORAZEPAM 2 MG: 2 INJECTION INTRAMUSCULAR; INTRAVENOUS at 13:07

## 2020-12-30 RX ADMIN — MAGNESIUM SULFATE HEPTAHYDRATE 2 G: 40 INJECTION, SOLUTION INTRAVENOUS at 06:48

## 2020-12-30 RX ADMIN — POTASSIUM CHLORIDE 10 MEQ: 7.46 INJECTION, SOLUTION INTRAVENOUS at 05:04

## 2020-12-30 RX ADMIN — POTASSIUM CHLORIDE 10 MEQ: 10 INJECTION, SOLUTION INTRAVENOUS at 09:36

## 2020-12-30 RX ADMIN — POTASSIUM CHLORIDE 10 MEQ: 10 INJECTION, SOLUTION INTRAVENOUS at 09:37

## 2020-12-30 RX ADMIN — DEXMEDETOMIDINE HYDROCHLORIDE 0.4 MCG/KG/HR: 100 INJECTION, SOLUTION INTRAVENOUS at 14:55

## 2020-12-30 RX ADMIN — SODIUM CHLORIDE: 9 INJECTION, SOLUTION INTRAVENOUS at 16:09

## 2020-12-30 RX ADMIN — POTASSIUM CHLORIDE 10 MEQ: 7.46 INJECTION, SOLUTION INTRAVENOUS at 07:43

## 2020-12-30 ASSESSMENT — PAIN SCALES - GENERAL
PAINLEVEL_OUTOF10: 0

## 2020-12-30 NOTE — CONSULTS
Critical Care Admit/Consult Note         Patient - Ozell Halsted   MRN -  [id number]   Acct # - [de-identified]   - 1945      Date of Admission -  2020  8:04 AM  Date of evaluation -  2020  0207/0207-A   Hospital Day - 2            ADMIT/CONSULT DETAILS     Reason for Admit/Consult   Critical care management for septic shock    Consulting Luisa Gurrola DO  Primary Care Physician - MD PRATIMA Duffy   The patient is a 66 y.o. male with significant past medical history of DVT, CHF with ejection fraction of 40 to 45%, alcohol use. He came from home to the ER on  due to having several day onset of weakness, he was feeling fatigued, was having issues with cough, shortness of breath, is that he had been drinking the night before. He states he was standing at his counter and his legs gave out because he felt so weak, did not hit his head, when EMS arrived he was still on top of the counter, but his legs were out from under him. It was found in the emergency department that he was Covid positive, he had a an ESTUARDO, BNP was also elevated at 2583, had a mild transaminitis. He was transferred to the floor for further management. Last night while  rapid response was called as patient was desaturating on his cannula, was placed on nonrebreather, with still had increased work of breathing so he was transitioned to BiPAP. He did not tolerate the BiPAP due to anxiety and had to be transferred down to the the ICU to be placed on a Precedex drip. He was given empiric antibiotics for possible aspiration, and was also given diuretics for possible CHF exacerbation. Appears more comfortable in his room today, satting 100% on his BiPAP, Precedex running at 0.2. Past Medical History         Diagnosis Date    CHF (congestive heart failure) ([facility]     Gout         Past Surgical History     History reviewed.  No pertinent surgical history. Influenza: Not up-to-date  Pneumococcal Polysaccharide: Not up-to-date                Current Medications   Current Medications    vancomycin  1,000 mg Intravenous Q12H    magnesium sulfate  2 g Intravenous Once    remdesivir IVPB  100 mg Intravenous Q24H    dornase alpha  2.5 mg Inhalation Daily    piperacillin-tazobactam  3.375 g Intravenous Q8H    allopurinol  300 mg Oral Daily    apixaban  5 mg Oral BID    lipase-protease-amylase  36,000 Units Oral TID WC    metoprolol succinate  50 mg Oral Daily    pantoprazole  40 mg Oral QAM AC    sodium chloride flush  10 mL Intravenous 2 times per day    dexamethasone  6 mg Oral Daily     potassium chloride, sodium chloride, HYDROcodone-acetaminophen, loperamide, sodium chloride flush, promethazine **OR** ondansetron, polyethylene glycol, acetaminophen **OR** acetaminophen  IV Drips/Infusions   sodium chloride      dexmedetomidine (PRECEDEX) IV infusion 0.2 mcg/kg/hr (12/30/20 0258)     Home Medications  Medications Prior to Admission: bumetanide (BUMEX) 1 MG tablet, Take 1 tablet by mouth daily (Patient taking differently: Take 1 mg by mouth daily as needed PT STATES ALL THIS MEDICINE DOES IS MAKE YOU PEE. TAKES WHEN HE THINKS HE NEEDS IT.)  metoprolol succinate (TOPROL XL) 50 MG extended release tablet, Take 1 tablet by mouth daily  sacubitril-valsartan (ENTRESTO) 24-26 MG per tablet, Take 1 tablet by mouth 2 times daily  allopurinol (ZYLOPRIM) 300 MG tablet, Take 300 mg by mouth daily  apixaban (ELIQUIS) 5 MG TABS tablet, Take 5 mg by mouth 2 times daily  HYDROcodone-acetaminophen (NORCO) 7.5-325 MG per tablet, Take 1 tablet by mouth every 6 hours as needed for Pain. omeprazole (PRILOSEC) 20 MG delayed release capsule, Take 20 mg by mouth daily  lipase-protease-amylase (CREON) 99854 units CPEP delayed release capsule, Take by mouth as needed TAKES AFTER MEALS PRN.   loperamide (IMODIUM) 2 MG capsule, Take 2 mg by mouth every 4-6 hours as needed for Diarrhea    Diet/Nutrition   DIET LOW SODIUM 2 GM; Allergies   Levofloxacin    Social History   Tobacco   reports that he has never smoked. He has never used smokeless tobacco.    Alcohol     reports current alcohol use.     Occupational history :    Family History         Problem Relation Age of Onset    Breast Cancer Mother     No Known Problems Sister     No Known Problems Brother     No Known Problems Sister        Sleep History   n/a    ROS     REVIEW OF SYSTEMS:Limited due to patient's respiratory distress, being on BiPAP  CONSTITUTIONAL:  negative for  fevers, chills and weight loss  EYES:  negative for  double vision and blurred vision  HEENT:  negative for  nasal congestion and sore throat  RESPIRATORY:  positive for  cough and dyspnea  CARDIOVASCULAR:  negative for  chest pain, palpitations  GASTROINTESTINAL:  negative for nausea, vomiting and diarrhea  GENITOURINARY:  negative for frequency and dysuria  INTEGUMENT/BREAST:  negative for rash and skin lesion(s)  HEMATOLOGIC/LYMPHATIC:  negative for easy bruising and bleeding  ALLERGIC/IMMUNOLOGIC:  negative for recurrent infections and urticaria  ENDOCRINE:  negative for weight changes and diabetic symptoms including neither polyuria nor polydipsia  MUSCULOSKELETAL:  negative for  myalgias and arthralgias  NEUROLOGICAL:  negative for headaches, dizziness and numbness  BEHAVIOR/PSYCH:  negative for poor appetite and anxiety    Lines and Devices    peripherals    Mechanical Ventilation Data   VENT SETTINGS (Comprehensive)  Vent Information  Skin Assessment: Clean, dry, & intact  FiO2 : 60 %  SpO2: 100 %  SpO2/FiO2 ratio: 166.67  Mask Type: Full face mask  Mask Size: Medium  Additional Respiratory  Assessments  Pulse: 90  Resp: 30  SpO2: 100 %    ABG  Lab Results   Component Value Date    PH 7.420 12/30/2020    PCO2 34.0 12/30/2020    PO2 103.7 12/30/2020    HCO3 21.6 12/30/2020    O2SAT 97.8 12/30/2020     Lab Results   Component Value Date MODE BIPAP 18/10 2020           Vitals    height is 6' (1.829 m) and weight is 199 lb 15.3 oz (90.7 kg). His bladder temperature is 102.4 °F (39.1 °C). His blood pressure is 117/73 and his pulse is 90. His respiration is 30 and oxygen saturation is 100%. Temperature Range: Temp: 102.4 °F (39.1 °C) Temp  Av.7 °F (38.2 °C)  Min: 98.2 °F (36.8 °C)  Max: 104 °F (40 °C)  BP Range:  Systolic (04SGF), NJW:482 , Min:96 , BWJ:112     Diastolic (11NAS), EJY:35, Min:7, Max:109    Pulse Range: Pulse  Av.3  Min: 84  Max: 130  Respiration Range: Resp  Av  Min: 18  Max: 47  Current Pulse Ox[de-identified]  SpO2: 100 %  24HR Pulse Ox Range:  SpO2  Av.5 %  Min: 76 %  Max: 100 %  Oxygen Amount and Delivery: O2 Flow Rate (L/min): 15 L/min      I/O (24 Hours)    Patient Vitals for the past 8 hrs:   BP Temp Temp src Pulse Resp SpO2 Weight   20 0700 117/73 -- -- 90 30 100 % --   20 0630 115/64 -- -- 84 (!) 31 100 % --   20 0600 133/78 -- -- 96 26 100 % --   20 0530 125/76 -- -- 99 27 100 % --   20 0500 112/75 -- -- 101 (!) 37 -- --   20 0430 96/63 -- -- 99 25 100 % --   20 0400 110/66 102.4 °F (39.1 °C) Bladder 92 21 99 % --   20 0330 125/67 -- -- 97 30 99 % --   20 0300 123/78 102.6 °F (39.2 °C) Bladder 130 28 99 % 199 lb 15.3 oz (90.7 kg)   20 0230 138/82 -- -- 109 (!) 47 100 % --   20 0200 128/75 104 °F (40 °C) Bladder 101 29 99 % --   20 0130 119/80 -- -- 109 (!) 41 99 % --   20 0100 (!) 170/87 -- -- 121 30 97 % --   20 0030 (!) 173/93 -- -- 118 (!) 38 99 % --   20 0000 (!) 163/98 103.8 °F (39.9 °C) Bladder 123 (!) 46 98 % --       Intake/Output Summary (Last 24 hours) at 2020 0757  Last data filed at 2020 0743  Gross per 24 hour   Intake 1520 ml   Output 1720 ml   Net -200 ml     I/O last 3 completed shifts: In: 8800 [P.O.:720;  I.V.:50; IV Piggyback:650]  Out: 6972 [Urine:1720]   Date 20 0000 - 12/30/20 2359   Shift 4157-5966 6263-5798 6336-4511 24 Hour Total   INTAKE   I.V.(mL/kg) 50(0.6)   50(0.6)   IV Piggyback(mL/kg) 750(8.3)   750(8.3)   Shift Total(mL/kg) 800(8.8)   800(8.8)   OUTPUT   Urine(mL/kg/hr) 1500   1500   Shift Total(mL/kg) 1500(16.5)   1500(16.5)   Weight (kg) 90.7 90.7 90.7 90.7     Patient Vitals for the past 96 hrs (Last 3 readings):   Weight   12/30/20 0300 199 lb 15.3 oz (90.7 kg)   12/29/20 0600 201 lb (91.2 kg)   12/28/20 0816 200 lb (90.7 kg)           Exam         PHYSICAL EXAM:    General appearance - alert, ill-appearing, responds to commands  Mental status - alert, oriented to person, place, time  Eyes - pupils equal and reactive, extraocular eye movements intact, sclera anicteric  Ears - external ear normal  Nose - normal and patent, no erythema, discharge or polyps  Mouth - mucous membranes moist, pharynx normal without lesions  Neck - supple, no significant adenopathy  Chest -diminished throughout, wheezing present  Heart - normal rate, regular rhythm, normal S1, S2, no murmurs, rubs, clicks or gallops  Abdomen - soft, nontender, nondistended, no masses or organomegaly  Neurological - alert, oriented person, responds to commands, moves extremities  Extremities -1+edema of the lower extremities  Skin -no visible lesions    Data   Old records and images have been reviewed    Lab Results   CBC     Lab Results   Component Value Date    WBC 6.8 12/29/2020    RBC 3.54 12/29/2020    HGB 12.3 12/29/2020    HCT 36.9 12/29/2020     12/29/2020    .2 12/29/2020    MCH 34.7 12/29/2020    MCHC 33.3 12/29/2020    RDW 12.7 12/29/2020    LYMPHOPCT 6.9 12/29/2020    MONOPCT 3.6 12/29/2020    BASOPCT 0.1 12/29/2020    MONOSABS 0.26 12/29/2020    LYMPHSABS 0.50 12/29/2020    EOSABS 0.00 12/29/2020    BASOSABS 0.01 12/29/2020       BMP   Lab Results   Component Value Date     12/30/2020    K 3.4 12/30/2020    K 3.5 12/29/2020     12/30/2020    CO2 23 12/30/2020    BUN 17 12/30/2020    CREATININE 1.2 12/30/2020    GLUCOSE 138 12/30/2020    CALCIUM 8.5 12/30/2020       LFTS  Lab Results   Component Value Date    ALKPHOS 48 12/30/2020    ALT 52 12/30/2020     12/30/2020    PROT 7.1 12/30/2020    BILITOT 0.3 12/30/2020    BILIDIR <0.2 12/28/2020    IBILI see below 12/28/2020    LABALBU 3.1 12/30/2020       INR  Recent Labs     12/28/20  1331   PROTIME 12.0   INR 1.0       APTT  Recent Labs     12/28/20  1331   APTT 24.8       Lactic Acid  Lab Results   Component Value Date    LACTA 2.2 12/29/2020    LACTA 1.8 12/29/2020    LACTA 1.7 12/20/2019        BNP   No results for input(s): BNP in the last 72 hours. Cultures     Recent Labs     12/28/20  0847   BC 24 Hours no growth     Recent Labs     12/28/20  0847   BLOODCULT2 24 Hours no growth       Recent Labs     12/28/20  0847   LABURIN <10,000 CFU/mL  Gram negative rods  Mixed gram positive organisms               Radiology   Xr Ribs Left Include Chest (min 3 Views)    Result Date: 12/28/2020  EXAMINATION: 5 XRAY VIEWS OF THE LEFT RIBS WITH FRONTAL XRAY VIEW OF THE CHEST 12/28/2020 9:17 am COMPARISON: None. HISTORY: ORDERING SYSTEM PROVIDED HISTORY: Rib pain after fall TECHNOLOGIST PROVIDED HISTORY: Reason for exam:->Rib pain after fall FINDINGS: There is no right or left rib fracture. Multiple views the left wrist were obtained. There is no left pneumothorax or lung contusion. Multifocal bilateral pulmonary infiltrates are noted more prominent within the right upper lobe consistent with pneumonia. The heart is enlarged. 1. There is no appreciable left rib fracture or pneumothorax 2. Multifocal pneumonia more prominent within the right upper lobe.     Xr Chest Portable    Result Date: 12/30/2020  EXAMINATION: ONE XRAY VIEW OF THE CHEST 12/29/2020 10:24 pm COMPARISON: 12/20/2020 HISTORY: ORDERING SYSTEM PROVIDED HISTORY: hjypoxia TECHNOLOGIST PROVIDED HISTORY: Reason for exam:->hjypoxia FINDINGS: Increasing right lung airspace opacities compared to prior study. The left lung is clear. No sizable effusion or pneumothorax. Stable cardiomegaly. No gross osseous abnormalities. Increasing right lung airspace opacities compared to prior study. Xr Hip 2-3 Vw W Pelvis Left    Result Date: 12/28/2020  EXAMINATION: ONE XRAY VIEW OF THE PELVIS AND TWO XRAY VIEWS LEFT HIP for three views total 12/28/2020 9:17 am COMPARISON: None. HISTORY: ORDERING SYSTEM PROVIDED HISTORY: fall - pain TECHNOLOGIST PROVIDED HISTORY: Reason for exam:->fall - pain FINDINGS: No fracture or dislocation. The sacroiliac and hip joints appear preserved for age. Pelvic phleboliths. Lumbar spine levoscoliosis and degenerative spondylosis. Atherosclerotic calcifications. 1.  Negative for fracture or acute osseous abnormality. 2.  Chronic findings, as above       SYSTEMS ASSESSMENT    Neuro   Agitation/ DT's-likely related to hypoxia, patient is calmer now that his sats have improved, and he is on Precedex, wean sedation as tolerated  phenobarbitol to help control DT  ciwa protocol    Respiratory   Acute hypoxic respiratory failure-likely related to COVID-19 pneumonia, will continue to monitor, wean BiPAP as tolerated    Cardiovascular   Monitor for any signs of bradycardia as patient is on Precedex  History of CHF-monitor I's and O's, chest x-rays to evaluate for fluid overload  Troponin elevation- downtrending, likely demand ischemia, no active chest pain    Gastrointestinal   Protonix 40 mg    Renal   ESTUARDO-creatinine 1.2 12/30, was 1.0, may be related to Lasix, or cardiorenal syndrome     Infectious Disease   COVID-19 pneumonia-Decadron, remdesivir, empiric antibiotics for aspiration, may require tocilizumab    Hematology/Oncology   Eliquis for DVT    Endocrine   We will have to monitor pedal status patient tolerating off BiPAP    Social/Spiritual/DNR/Other   Full code    ASSESSMENT/ PLAN   1. Restart home bumex IV  2.  Phenobarb load, CIWA protocol  3. D/c precidex  4. Continue home medications  5. Vanc and zosyn for empiric coverage of PNA  6. Remdesivir, decadron for COVID  7. Continue ICU level of care    Gaetano Beltran MD PGY-1  12/30/2020 8:31 AM        I personally saw, examined and provided care for the patient. Radiographs, labs and medication list were reviewed by me independently. I spoke with bedside nursing, therapists and consultants. Critical care services and times documented are independent of procedures and multidisciplinary rounds with Residents. Additionally comprehensive, multidisciplinary rounds were conducted with the MICU team. The case was discussed in detail and plans for care were established. Review of Residents documentation was conducted and revisions were made as appropriate. I agree with the above documented exam, problem list and plan of care. bipap   Abx for aspiration pna   Phenobarbital   Precedex does not inhibit alena therefore although a patient is sedated on precedex it only masks DT and does not treat DT. Precedex works on A2 receptor. Wean o2 as tolerated     Johnny Leblanc M.D.    Pulmonary/Critical Care Medicine     43 min cct excluding procedures

## 2020-12-30 NOTE — PROGRESS NOTES
Pharmacy Consultation Note  (Antibiotic Dosing and Monitoring)    Initial consult date: 2020  Consulting physician/provider: Dr. Jacques Gonzales  Drug(s): Vancomycin  Indication: Pneumonia    Ht Readings from Last 1 Encounters:   20 6' (1.829 m)     Wt Readings from Last 1 Encounters:   20 199 lb 15.3 oz (90.7 kg)       Age/Gender IBW DW  Allergy Information   66 y.o. male 77.6 kg 90.7 kg  Levofloxacin               Date  WBC BUN/CR Drug/Dose Time   Given Level(s)   (Time) Comments    6.8 17/1.2 Vancomycin 1 g IV x 1 dose  ----------  Vancomycin 750 mg IV x 1 dose 0256        Vancomycin 1500 mg IV Q24H                          Estimated Creatinine Clearance: 57 mL/min (based on SCr of 1.2 mg/dL). Intake/Output Summary (Last 24 hours) at 2020 1325  Last data filed at 2020 0800  Gross per 24 hour   Intake 1323.15 ml   Output 1670 ml   Net -346.85 ml       Temp max: Temp (24hrs), Av °F (38.3 °C), Min:98.2 °F (36.8 °C), Max:104 °F (40 °C)      Cultures:  available culture and sensitivity results were reviewed in EPIC   S pneumoniae antigen - negative   Legionella antigen - negative   blood cx - no growth to date   urine cx - <10,000 GNR, mixed Gram positive organisms   blood cx - pending    Assessment:  · Patient is a 66year old male currently ordered vancomycin and piperacillin/tazobactam empirically  · Consulted by Dr. Marj Falcon to dose/monitor vancomycin  · Goal trough level = 15-20 mcg/mL; goal AUC/VANCE = 400-600    Plan:  · Will continue vancomycin 1500 mg IV every 24 hours  · Will check trough level at steady state if vancomycin continues  · Pharmacist will follow and monitor/adjust dosing as necessary.     Erica Gonzalez, PharmD, BCCCP  2020  1:37 PM

## 2020-12-30 NOTE — PROGRESS NOTES
Spoke with pt's partner Celia Nephew and gave update of pt status. He has no additional questions or concerns at this time.

## 2020-12-30 NOTE — CONSULTS
CARDIOLOGY CONSULTATION    [place]  [place] [facility] [place]  1945    Reason for Consultation:   History of congestive cardiomyopathy    History of Present Illness:   Maria M Abel presents to [address], following history of recent onset of profound weakness and shortness of breath and EMS was called and subsequently he was transported to the emergency room. Upon further investigation he was found to be COVID-19 positive and admitted. He does have a history of marked left ventricular systolic dysfunction has been on a neprilysin inhibitor in addition to beta-blocker mineralocorticoid inhibitor and diuretic. He has fared well since his last admission 1 year ago until now. Over the past week or so he has noted an increase in fatigue as well as significant dyspnea but no chest discomfort. He denies any palpitations or sudden lightheadedness. He denies hemoptysis. He is cardiac monitor suggest that of nonsustained ventricular tachycardia as well as supraventricular tachycardia. He is now admitted for further medical therapy. Past Medical History:   has a past medical history of CHF (congestive heart failure) ([address] and Gout. Surgical History:   has no past surgical history on file. Social History:   reports that he has never smoked. He has never used smokeless tobacco. He reports current alcohol use. He reports that he does not use drugs. Denies smoking or behavioral excess. Smoked many years ago. Family History:  family history is remarkable for both parents  secondary to cancer. Medications:  Prior to Admission medications    Medication Sig Start Date End Date Taking?  Authorizing Provider   bumetanide (BUMEX) 1 MG tablet Take 1 tablet by mouth daily  Patient taking differently: Take 1 mg by mouth daily as needed PT STATES ALL THIS MEDICINE DOES IS MAKE YOU PEE. TAKES WHEN HE THINKS HE NEEDS IT. 19  Yes Diego Alcantar MD   metoprolol succinate (TOPROL XL) 50 MG extended release tablet Take 1 tablet by mouth daily 12/25/19  Yes Sakshi Bond MD   sacubitril-valsartan (ENTRESTO) 24-26 MG per tablet Take 1 tablet by mouth 2 times daily 12/24/19  Yes Sakshi Bond MD   allopurinol (ZYLOPRIM) 300 MG tablet Take 300 mg by mouth daily   Yes Historical Provider, MD   apixaban (ELIQUIS) 5 MG TABS tablet Take 5 mg by mouth 2 times daily   Yes Historical Provider, MD   HYDROcodone-acetaminophen (NORCO) 7.5-325 MG per tablet Take 1 tablet by mouth every 6 hours as needed for Pain. Yes Historical Provider, MD   omeprazole (PRILOSEC) 20 MG delayed release capsule Take 20 mg by mouth daily   Yes Historical Provider, MD   lipase-protease-amylase (CREON) 81977 units CPEP delayed release capsule Take by mouth as needed TAKES AFTER MEALS PRN. Historical Provider, MD   loperamide (IMODIUM) 2 MG capsule Take 2 mg by mouth every 4-6 hours as needed for Diarrhea    Historical Provider, MD       Allergies:  Levofloxacin     Review of Systems:   · Constitutional: there has been no unanticipated weight loss. There's been no significant change in energy level, sleep pattern or activity level. No fever chills or rigors. · Eyes: No visual changes or diplopia. No scleral icterus. · ENT: No Headaches, hearing loss or vertigo. No mouth sores or sore throat. No change in taste or smell. · Cardiovascular: Denies chest discomfort, + dyspnea on exertion and now at rest, but no palpitations, loss of consciousness, no phlebitis, no claudication. · Respiratory: + Cough but no wheezing, no sputum production. No hemoptysis, pleuritic pain. · Gastrointestinal: No abdominal pain, appetite loss, blood in stools. No change in bowel habits. No hematemesis  · Genitourinary: No dysuria, trouble voiding or hematuria. No nocturia or increased frequency. · Musculoskeletal:  No gait disturbance, + generalized weakness no specific joint complaints.   · Integumentary: No rash or pruritis. · Neurological: No headache, diplopia, change in muscle strength, numbness or tingling. No change in gait, balance, coordination, mood, affect, memory, mentation, behavior. · Psychiatric: No anxiety or depression. · Endocrine: No temperature intolerance. No excessive thirst, fluid intake, or urination. No tremor. · Hematologic/Lymphatic: No abnormal bruising or bleeding, blood clots or swollen lymph nodes. · Allergic/Immunologic: No nasal congestion or hives. Physical Examination:    Vital Signs: /71  Pulse 106  Temp 100.6 °F (38.1 °C) (Bladder)   Resp 18   Ht 6' (1.829 m)   Wt 201 lb (91.2 kg)   SpO2 91%   BMI 27.26 kg/m²   General appearance: Well preserved, mesomorphic body habitus, alert, no distress. Skin: Skin color, texture, turgor normal. No rashes or lesions. No induration or tightening palpated. Head: Normocephalic. No masses, lesions, tenderness or abnormalities  Eyes: Conjunctivae/corneas clear. PERRL, EOMs intact. Sclera non icteric. Ears: External ears normal. Canals clear. TM's clear bilaterally. Hearing normal to finger rub. Nose/Sinuses: Nares normal. Septum midline. Mucosa normal. No drainage or sinus tenderness. Oropharynx: Lips, mucosa, and tongue normal. Oropharynx clear with no exudate seen. Neck: Neck supple and symmetric. No adenopathy. Thyroid symmetric, normal size, without nodules. Trachea is midline. Carotids brisk in upstroke without bruits, no abnormal JVP noted at 45°. Chest: Even excursion  Lungs: Lungs grossly clear to auscultation bilaterally. No retractions or use of accessory muscles. No tactile vocal fremitus. No rhonchi, crackles or rales. Heart:  S1 > S2. Regular, irregular rhythm. Ectopics noted. No gallop or murmur. No rub, palpable thrill or heave noted. PMI 5th intercostal space midclavicular line. Abdomen: Abdomen soft, moderately protuberant, non-tender. BS normal. No masses, organomegaly. No hernia noted.   Extremities: Extremities normal. No deformities, edema, or skin discoloration. No cyanosis or clubbing noted to the nails. Peripheral pulses present 2+ upper extremities and present 1+  lower extremities. Musculoskeletal: Spine ROM normal. Muscular strength intact. Neuro: Cranial nerves intact. Motor: Strength 5/5 in all extremities. Reflexes 2+ in all extremities. No focal weakness. Sensory: grossly normal to touch. Coordination intact. Pertinent Labs:  CBC:     20  0847 20  1225   WBC 4.7 7.2   HGB 10.9* 11.7*    230     BMP:    20  0847 20  1331 20  1225     --  135   K 2.5*  --  3.5     --  100   CO2 18*  --  23   BUN 14 17 16   CREATININE 1.3*  --  1.0   GLUCOSE 122*  --  158*   LABGLOM 53  --  >60       INR:   Recent Labs     20  1331   INR 1.0     PRO-BNP:   Lab Results   Component Value Date    PROBNP 2,583 (H) 2020    PROBNP 746 (H) 2019         Lipid Profile:   Lab Results   Component Value Date    TRIG 107 2019    HDL 74 2019    LDLCALC 75 2019    CHOL 170 2019      Hemoglobin A1C: No components found for: HGBA1C   ECG:  See report    Radiology:  Xr Chest Portable    Result Date: 2019  Patient MRN: [id number] : 1945 Age:  66 years Gender: Male Order Date: 2019 9:45 AM Exam: XR CHEST PORTABLE Number of Images: 1 views Indication:   Shortness of breath Shortness of breath Comparison: None. Findings: Single portable anterior view of the chest demonstrates mild cardiomegaly with normal pulmonary vascularity. Scattered bibasilar areas of opacification noted which may be chronic. No pleural fluid evident. Cardiomegaly without failure Basilar areas of parenchymal opacification possibly chronic     Xr Ribs Left Include Chest (min 3 Views)    Result Date: 2020  1. There is no appreciable left rib fracture or pneumothorax 2. Multifocal pneumonia more prominent within the right upper lobe.     Xr Chest Portable    Result Date: 12/30/2020  Increasing right lung airspace opacities compared to prior study. Xr Hip 2-3 Vw W Pelvis Left    Result Date: 12/28/2020  1. Negative for fracture or acute osseous abnormality. 2.  Chronic findings, as above    Assessment:    Active Problems:    DVT (deep venous thrombosis) ([facility])    CHF with unknown LVEF ([facility])    COVID-19    Hypokalemia    Hypomagnesemia    Fever and chills    Hypoxia    Acute hypoxemic respiratory failure due to severe acute respiratory syndrome coronavirus 2 (SARS-CoV-2) disease ([facility])  Resolved Problems:    * No resolved hospital problems. *      Plan:  Based upon Mr. Miller Grief presentation, it appears that he has a combination of factors including exacerbation of his shortness of breath related to both heart failure but more so COVID-19. Would maintain cardiac medications as ordered and observe for further cardiac arrhythmia and intervene as needed. Carefully monitor renal function and adjust medications accordingly. I have spent more than 45 minutes face to face with Sarah Maynard reviewing notes and laboratory data with greater than 50% of this time instructing and counseling the patient and his family member regarding my findings and recommendations and I have answered all questions as posed to me by Mr. Daja Martins. Thank you, Robert Barajas MD for allowing me to consult in the care of this patient. Radha Burgos DO, FACP, FACC, [facility]    NOTE:  This report was transcribed using voice recognition software. Every effort was made to ensure accuracy; however, inadvertent computerized transcription errors may be present.

## 2020-12-30 NOTE — PROGRESS NOTES
Date: 12/29/2020    Time: 11:35 PM    Patient Placed On BIPAP/CPAP/ Non-Invasive Ventilation? Yes    If no must comment. Facial area red/color change? No           If YES are Blister/Lesion present? No   If yes must notify nursing staff  BIPAP/CPAP skin barrier?   Yes    Skin barrier type:mepilexlite       Comments:        Madi Clemente

## 2020-12-30 NOTE — PROGRESS NOTES
Date: 12/30/2020    Time: 3:55 PM    Patient Placed On BIPAP/CPAP/ Non-Invasive Ventilation? Pt remains on bipap    If no must comment. Facial area red/color change? No           If YES are Blister/Lesion present? No   If yes must notify nursing staff  BIPAP/CPAP skin barrier?   Yes    Skin barrier type:mepilexlite       Comments:        Jimy Costello

## 2020-12-30 NOTE — CARE COORDINATION
COVID POSITIVE 12/28. Transferred to ICU yesterday post RRT. On Precedex drip. Febrile. Requiring continuous 60% Bipap- [employer] following-will need O2 testing/order if unable to wean off. From home independently w/ male friend David Lassitermala PTA. On Eliquis at home.   Will follow  Esvin Enriquez

## 2020-12-30 NOTE — PROGRESS NOTES
Noticed patient's oxygenation decreasing went to check on him. Found patient's respirations, BP, and pulse elevated. RRT called. Patient placed on Avaps- 87- 91%. FIO2 100%. RR 42 Pap 10.    97% Bipap 18/10.  FIO2 100% back up 18    Patient transferred to ICU bed 207

## 2020-12-30 NOTE — SIGNIFICANT EVENT
RRT Note  Called for respiratory distress. 67 yo male admitted with covid. Was on [facility]. Developed sudden respiratory distress with pulse ox 84% on nonrebreather. Placed on bipap 18/10 FiO2 100% with oxygen saturation around 98% however he has persistent increased work of breathing. Given a dose of lasix 60mg empirically. cxr reviewed, per my read shows likely mucus plugging on right with possible tracheal deviation.     -continue bipap and one dose dornase  -remained stable throughout time he was monitored, if he doesn't improve may need intubation.   -bp and hr elevated. Likely reactive. Will give a dose of lopressor and a dose of iv hydralazine. -reportedly no vomiting and low concern for aspiration. Start empiric abx. Yolanda Ledbetter 11:50 PM 12/29/20    Prior cxr showed similar appearing trachea. Report reviewed, consistent with pneumonia. Had a fever of 104. Seen again, more comfortable but still with tachypnea. abg reviewed. For now will continue with bipap and monitor.      cctime 40 mins  Yolanda Ledbetter 1:05 AM 12/30/20

## 2020-12-30 NOTE — PROGRESS NOTES
1255: precedex was stopped  due to not needing it. Stable VS and easy breathing on 4lnc.     1307: Pt given 2mg of Ativan per CIWA protocol. 1330: PT began to mildly desat to 89%. Pt placed back on bipap. He became tachycardic into 140's, hypertensive into 190's, and tachypneic into mid 40's. Precedex restarted. ABG drawn-results were unremarkable. Dr. Devin King bedside and aware-order received for 1mg push of Bumex due to recent increase in BNP.     1500: Pt stabilized and receiving ECHO. Sat is 98%, HR 98, /78, RR 30. Will continue to monitor and titrate precedex and CIWA per orders.

## 2020-12-30 NOTE — PROGRESS NOTES
[facility]) Hospitalist Progress Note    Admission Date  12/28/2020  8:04 AM  Chief Complaint Fall  Admit Dx   Acute hypoxemic respiratory failure due to severe acute respiratory syndrome coronavirus 2 (SARS-CoV-2) disease ([facility]) [U07.1, J96.01]    Subjective  History of Present Illness  75M w PMH HFrEF, HTN, past LLE DVT, gout presented to ED 12/28 w weakness and hypoxia, fell, had L rib and hip pain.  COVID positive, CXR w multifocal pna.  Pt charted at 90% on RA at the lowest.  Admitted for further monitoring and tx. Had RRT for SOB evening of 12/29 and moved to ICU and placed on BiPAP and Precedex gtt. Abx started for poss aspiration and diuretics for poss volume overload. Pt seen at bedside, currently resting comfortably on BiPAP.     Review of Systems - 12-point review of systems has been reviewed and is otherwise negative except as listed in the HPI    Objective  Physical Exam  Vitals: BP (!) 165/91   Pulse 96   Temp 100.1 °F (37.8 °C) (Bladder)   Resp (!) 42   Ht 6' (1.829 m)   Wt 199 lb 15.3 oz (90.7 kg)   SpO2 98%   BMI 27.12 kg/m²   General: well-developed, well-nourished, no acute distress, cooperative  Skin: warm, dry, intact, normal color without cyanosis  HEENT: normocephalic, atraumatic, mucous membranes normal; BiPAP in place  Respiratory: diminished but largely clear to auscultation bilaterally without respiratory distress   Cardiovascular: regular rate and rhythm without murmur / rub / gallop    Abdominal: soft, nontender, nondistended, normoactive bowel sounds  Extremities: no mottling, pulses intact, no edema  Neurologic: awake, alert, no focal deficits  Psychiatric: normal affect, cooperative    Assessment / Plan  Acute hypoxic resp fail d/t COVID              Charted 90% at the lowest, currently on BiPAP              LFTs elevated - follow on CMP              COVID positive              Decadron / vitC / vitD / [address] consult for remdesivir              Check strep / Legionella Ag, blood / urine / resp cx - all neg / NGTD              On Eliquis for DVT ppx   S/p RRT evening of 12/30 for SOB, was on 2L previously, moved to ICU    Poss aspiration - inc R lung opacities - vanc / Zosyn started 12/30    Poss volume overload - Lasix 60 mg x1 given     Chronic HFrEF, poss acute component              CXR noted              Follow volume status              On Bumex / Aldactone / Enstresto / Toprol              Follows w Dr. Mesha Lord - consult placed - input appreciated   BNP td 2600 --> 80019; Lasix given as above     Chronic LLE DVT - continue home Eliquis     HypoK / HypoMg - replete / monitor     ESTUARDO - baseline Cr ~1.0, 1.3 in ED, likely pre-renal, follow on IVF - now 1.2     Lactic acidosis - 3.6 in ED, follow on IVF, now 2.2    Code status  Full  DVT prophylaxis Eliquis  Disposition  To be determined    Electronically signed by Michelle Mims DO on 12/30/2020 at 2:31 PM

## 2020-12-30 NOTE — FLOWSHEET NOTE
Yeimi Hernadez called in and was given an update. He is aware that there is a possibility of intubation overnight. He would like up dated in the morning if that occurs.

## 2020-12-30 NOTE — PROGRESS NOTES
PROGRESS NOTE       PATIENT PROBLEM LIST:  Active Problems:    DVT (deep venous thrombosis) (HCC)    CHF with unknown LVEF (HCC)    COVID-19    Hypokalemia    Hypomagnesemia    Fever and chills    Hypoxia    Acute hypoxemic respiratory failure due to severe acute respiratory syndrome coronavirus 2 (SARS-CoV-2) disease (HCC)  Resolved Problems:    * No resolved hospital problems. *      SUBJECTIVE:  Jorge Diaz had an RRT last evening and transferred to the intensive care unit. REVIEW OF SYSTEMS:  Patient case was discussed with nursing and RT staff in an effort to preserve PPE and prevent spread of COVID-19. SCHEDULED MEDICATIONS:   vancomycin  1,000 mg Intravenous Q12H    magnesium sulfate  2 g Intravenous Once    PHENobarbital IVPB  260 mg Intravenous Once    thiamine  100 mg Oral Daily    folic acid  1 mg Oral Daily    remdesivir IVPB  100 mg Intravenous Q24H    piperacillin-tazobactam  3.375 g Intravenous Q8H    allopurinol  300 mg Oral Daily    apixaban  5 mg Oral BID    lipase-protease-amylase  36,000 Units Oral TID WC    metoprolol succinate  50 mg Oral Daily    pantoprazole  40 mg Oral QAM AC    sodium chloride flush  10 mL Intravenous 2 times per day    dexamethasone  6 mg Oral Daily       VITAL SIGNS:                                                                                                                          /67   Pulse 97   Temp 101.1 °F (38.4 °C) (Bladder)   Resp (!) 32   Ht 6' (1.829 m)   Wt 199 lb 15.3 oz (90.7 kg)   SpO2 94%   BMI 27.12 kg/m²   Patient Vitals for the past 96 hrs (Last 3 readings):   Weight   12/30/20 0300 199 lb 15.3 oz (90.7 kg)   12/29/20 0600 201 lb (91.2 kg)   12/28/20 0816 200 lb (90.7 kg)     OBJECTIVE:  Due to the current efforts to prevent transmission of COVID-19 and also the need to preserve PPE for other caregivers, a face-to-face encounter with the patient was not performed.   That being said, all relevant records and diagnostic tests were reviewed, including laboratory results and imaging. Please reference any relevant documentation elsewhere. Care will be coordinated with the primary service. Data:   Scheduled Meds: Reviewed  Continuous Infusions:    sodium chloride      dexmedetomidine (PRECEDEX) IV infusion 0.2 mcg/kg/hr (12/30/20 0800)       Intake/Output Summary (Last 24 hours) at 12/30/2020 1116  Last data filed at 12/30/2020 0800  Gross per 24 hour   Intake 1323.15 ml   Output 1670 ml   Net -346.85 ml     CBC:   Recent Labs     12/28/20  0847 12/29/20  1225 12/29/20  2351   WBC 4.7 7.2 6.8   HGB 10.9* 11.7* 12.3*   HCT 33.2* 35.0* 36.9*    230 257     BMP:  Recent Labs     12/28/20  0847 12/28/20  1331 12/29/20  1225 12/30/20  0145     --  135 139   K 2.5*  --  3.5 3.4*     --  100 101   CO2 18*  --  23 23   BUN 14 17 16 17   CREATININE 1.3*  --  1.0 1.2   LABGLOM 53  --  >60 59     ABGs:   Lab Results   Component Value Date    PH 7.420 12/30/2020    PO2 103.7 12/30/2020    PCO2 34.0 12/30/2020     INR:   Recent Labs     12/28/20  1331   INR 1.0     PRO-BNP:   Lab Results   Component Value Date    PROBNP 2,583 (H) 12/28/2020    PROBNP 746 (H) 12/23/2019      TSH:   Lab Results   Component Value Date    TSH 3.900 12/21/2019      Cardiac Injury Profile:   Recent Labs     12/28/20  0847 12/30/20  0145 12/30/20  0840   TROPONINI 0.03 0.15* 0.13*      Lipid Profile:   Lab Results   Component Value Date    TRIG 107 12/21/2019    HDL 74 12/21/2019    LDLCALC 75 12/21/2019    CHOL 170 12/21/2019      Hemoglobin A1C: No components found for: HGBA1C     RAD:   Xr Ribs Left Include Chest (min 3 Views)    Result Date: 12/28/2020  EXAMINATION: 5 XRAY VIEWS OF THE LEFT RIBS WITH FRONTAL XRAY VIEW OF THE CHEST 12/28/2020 9:17 am COMPARISON: None.  HISTORY: ORDERING SYSTEM PROVIDED HISTORY: Rib pain after fall TECHNOLOGIST PROVIDED HISTORY: Reason for exam:->Rib pain after fall FINDINGS: There is no right or

## 2020-12-30 NOTE — CONSULTS
Pharmacy Consultation Note  (Antibiotic Dosing and Monitoring)    Initial consult date: 20  Consulting physician: Rafal Evans  Drug: Vancomycin  Indication: Upper Respiratory    Age/  Gender Height Weight IBW Dosing weight  Allergy Information   75 y.o./male 6' (182.9 cm) 200 lb (90.7 kg)     Ideal body weight: 77.6 kg (171 lb 1.2 oz)  Adjusted ideal body weight: 83 kg (183 lb 0.7 oz)  91.2kg  Levofloxacin      Temp (24hrs), Av.1 °F (37.3 °C), Min:98.2 °F (36.8 °C), Max:100.6 °F (38.1 °C)          Date  WBC BUN SCr CrCl  (mL/min) Drug/Dose Time   Given Level(s)   (Time) Comments   20 6.8 16 1  68   Vancomycin 1000 mg IV every 12 hours                                              Intake/Output Summary (Last 24 hours) at 2020 0101  Last data filed at 2020 1830  Gross per 24 hour   Intake 720 ml   Output 220 ml   Net 500 ml       Historical Cultures:  No results found for: [facility]  Recent Labs     20  0847   BC 24 Hours no growth       Cultures:  available culture and sensitivity results were reviewed in EPIC    Assessment:  · 66 y.o. male has been initiated Vancomycin.   · Estimated CrCl = 38 mL/min  · Goal trough level = 15-20 mcg/mL    Plan:  · Will initiate vancomycin at a dose of 1000mg IV every 12 hours  · Monitor renal function   · Clinical pharmacy to follow      Elizabeth Jones, [facility] 2020 1:01 AM

## 2020-12-31 ENCOUNTER — APPOINTMENT (OUTPATIENT)
Dept: GENERAL RADIOLOGY | Age: 75
DRG: 871 | End: 2020-12-31
Payer: MEDICARE

## 2020-12-31 LAB
AADO2: 264.7 MMHG
ALBUMIN SERPL-MCNC: 2.9 G/DL (ref 3.5–5.2)
ALP BLD-CCNC: 49 U/L (ref 40–129)
ALT SERPL-CCNC: 38 U/L (ref 0–40)
ANION GAP SERPL CALCULATED.3IONS-SCNC: 9 MMOL/L (ref 7–16)
AST SERPL-CCNC: 81 U/L (ref 0–39)
B.E.: -0.3 MMOL/L (ref -3–3)
BASOPHILS ABSOLUTE: 0.02 E9/L (ref 0–0.2)
BASOPHILS RELATIVE PERCENT: 0.4 % (ref 0–2)
BILIRUB SERPL-MCNC: 0.3 MG/DL (ref 0–1.2)
BUN BLDV-MCNC: 21 MG/DL (ref 8–23)
CALCIUM SERPL-MCNC: 8 MG/DL (ref 8.6–10.2)
CHLORIDE BLD-SCNC: 103 MMOL/L (ref 98–107)
CO2: 27 MMOL/L (ref 22–29)
COHB: 0.2 % (ref 0–1.5)
CREAT SERPL-MCNC: 1.3 MG/DL (ref 0.7–1.2)
CRITICAL: ABNORMAL
DATE ANALYZED: ABNORMAL
DATE OF COLLECTION: ABNORMAL
EOSINOPHILS ABSOLUTE: 0.01 E9/L (ref 0.05–0.5)
EOSINOPHILS RELATIVE PERCENT: 0.2 % (ref 0–6)
FIO2: 60 %
GFR AFRICAN AMERICAN: >60
GFR NON-AFRICAN AMERICAN: 53 ML/MIN/1.73
GLUCOSE BLD-MCNC: 135 MG/DL (ref 74–99)
HCO3: 23.1 MMOL/L (ref 22–26)
HCT VFR BLD CALC: 33.6 % (ref 37–54)
HEMOGLOBIN: 10.8 G/DL (ref 12.5–16.5)
HHB: 1.9 % (ref 0–5)
IMMATURE GRANULOCYTES #: 0.02 E9/L
IMMATURE GRANULOCYTES %: 0.4 % (ref 0–5)
LAB: ABNORMAL
LYMPHOCYTES ABSOLUTE: 0.59 E9/L (ref 1.5–4)
LYMPHOCYTES RELATIVE PERCENT: 12.9 % (ref 20–42)
Lab: ABNORMAL
MAGNESIUM: 2.5 MG/DL (ref 1.6–2.6)
MCH RBC QN AUTO: 34.3 PG (ref 26–35)
MCHC RBC AUTO-ENTMCNC: 32.1 % (ref 32–34.5)
MCV RBC AUTO: 106.7 FL (ref 80–99.9)
METHB: 0.3 % (ref 0–1.5)
MODE: ABNORMAL
MONOCYTES ABSOLUTE: 0.16 E9/L (ref 0.1–0.95)
MONOCYTES RELATIVE PERCENT: 3.5 % (ref 2–12)
MRSA CULTURE ONLY: NORMAL
NEUTROPHILS ABSOLUTE: 3.79 E9/L (ref 1.8–7.3)
NEUTROPHILS RELATIVE PERCENT: 82.6 % (ref 43–80)
O2 CONTENT: 17 ML/DL
O2 SATURATION: 98.1 % (ref 92–98.5)
O2HB: 97.6 % (ref 94–97)
OPERATOR ID: 789
PATIENT TEMP: 37 C
PCO2: 33.9 MMHG (ref 35–45)
PDW BLD-RTO: 13.1 FL (ref 11.5–15)
PEEP/CPAP: 10 CMH2O
PFO2: 1.85 MMHG/%
PH BLOOD GAS: 7.45 (ref 7.35–7.45)
PHOSPHORUS: 3.6 MG/DL (ref 2.5–4.5)
PIP: 18 CMH2O
PLATELET # BLD: 209 E9/L (ref 130–450)
PMV BLD AUTO: 11.2 FL (ref 7–12)
PO2: 110.8 MMHG (ref 75–100)
POTASSIUM SERPL-SCNC: 3.7 MMOL/L (ref 3.5–5)
RBC # BLD: 3.15 E12/L (ref 3.8–5.8)
REASON FOR REJECTION: NORMAL
REJECTED TEST: NORMAL
RI(T): 239 %
SODIUM BLD-SCNC: 139 MMOL/L (ref 132–146)
SOURCE, BLOOD GAS: ABNORMAL
THB: 12.3 G/DL (ref 11.5–16.5)
TIME ANALYZED: 855
TOTAL PROTEIN: 6.3 G/DL (ref 6.4–8.3)
WBC # BLD: 4.6 E9/L (ref 4.5–11.5)

## 2020-12-31 PROCEDURE — 2580000003 HC RX 258: Performed by: INTERNAL MEDICINE

## 2020-12-31 PROCEDURE — 6370000000 HC RX 637 (ALT 250 FOR IP): Performed by: CLINICAL NURSE SPECIALIST

## 2020-12-31 PROCEDURE — 6360000002 HC RX W HCPCS: Performed by: INTERNAL MEDICINE

## 2020-12-31 PROCEDURE — 6370000000 HC RX 637 (ALT 250 FOR IP): Performed by: INTERNAL MEDICINE

## 2020-12-31 PROCEDURE — 2500000003 HC RX 250 WO HCPCS: Performed by: INTERNAL MEDICINE

## 2020-12-31 PROCEDURE — 36600 WITHDRAWAL OF ARTERIAL BLOOD: CPT

## 2020-12-31 PROCEDURE — 2000000000 HC ICU R&B

## 2020-12-31 PROCEDURE — 6360000002 HC RX W HCPCS: Performed by: CLINICAL NURSE SPECIALIST

## 2020-12-31 PROCEDURE — 2700000000 HC OXYGEN THERAPY PER DAY

## 2020-12-31 PROCEDURE — 82805 BLOOD GASES W/O2 SATURATION: CPT

## 2020-12-31 PROCEDURE — 85025 COMPLETE CBC W/AUTO DIFF WBC: CPT

## 2020-12-31 PROCEDURE — 94660 CPAP INITIATION&MGMT: CPT

## 2020-12-31 PROCEDURE — 99233 SBSQ HOSP IP/OBS HIGH 50: CPT | Performed by: INTERNAL MEDICINE

## 2020-12-31 PROCEDURE — 80053 COMPREHEN METABOLIC PANEL: CPT

## 2020-12-31 PROCEDURE — 84100 ASSAY OF PHOSPHORUS: CPT

## 2020-12-31 PROCEDURE — 6360000002 HC RX W HCPCS: Performed by: STUDENT IN AN ORGANIZED HEALTH CARE EDUCATION/TRAINING PROGRAM

## 2020-12-31 PROCEDURE — 6370000000 HC RX 637 (ALT 250 FOR IP): Performed by: STUDENT IN AN ORGANIZED HEALTH CARE EDUCATION/TRAINING PROGRAM

## 2020-12-31 PROCEDURE — 83735 ASSAY OF MAGNESIUM: CPT

## 2020-12-31 PROCEDURE — 2580000003 HC RX 258: Performed by: CLINICAL NURSE SPECIALIST

## 2020-12-31 PROCEDURE — 74018 RADEX ABDOMEN 1 VIEW: CPT

## 2020-12-31 PROCEDURE — 36415 COLL VENOUS BLD VENIPUNCTURE: CPT

## 2020-12-31 RX ORDER — LIDOCAINE HYDROCHLORIDE 10 MG/ML
5 INJECTION, SOLUTION EPIDURAL; INFILTRATION; INTRACAUDAL; PERINEURAL ONCE
Status: COMPLETED | OUTPATIENT
Start: 2020-12-31 | End: 2021-01-02

## 2020-12-31 RX ORDER — THIAMINE HYDROCHLORIDE 100 MG/ML
100 INJECTION, SOLUTION INTRAMUSCULAR; INTRAVENOUS DAILY
Status: DISCONTINUED | OUTPATIENT
Start: 2020-12-31 | End: 2021-01-02

## 2020-12-31 RX ORDER — HEPARIN SODIUM (PORCINE) LOCK FLUSH IV SOLN 100 UNIT/ML 100 UNIT/ML
3 SOLUTION INTRAVENOUS PRN
Status: DISCONTINUED | OUTPATIENT
Start: 2020-12-31 | End: 2021-01-07 | Stop reason: HOSPADM

## 2020-12-31 RX ORDER — HEPARIN SODIUM (PORCINE) LOCK FLUSH IV SOLN 100 UNIT/ML 100 UNIT/ML
3 SOLUTION INTRAVENOUS EVERY 12 HOURS SCHEDULED
Status: DISCONTINUED | OUTPATIENT
Start: 2020-12-31 | End: 2021-01-07 | Stop reason: HOSPADM

## 2020-12-31 RX ORDER — LANSOPRAZOLE
30 KIT
Status: DISCONTINUED | OUTPATIENT
Start: 2020-12-31 | End: 2021-01-02

## 2020-12-31 RX ORDER — CLOTRIMAZOLE 1 %
CREAM (GRAM) TOPICAL 2 TIMES DAILY
Status: DISCONTINUED | OUTPATIENT
Start: 2020-12-31 | End: 2021-01-07 | Stop reason: HOSPADM

## 2020-12-31 RX ORDER — POTASSIUM CHLORIDE 7.45 MG/ML
10 INJECTION INTRAVENOUS
Status: COMPLETED | OUTPATIENT
Start: 2020-12-31 | End: 2020-12-31

## 2020-12-31 RX ORDER — SODIUM CHLORIDE 0.9 % (FLUSH) 0.9 %
10 SYRINGE (ML) INJECTION PRN
Status: DISCONTINUED | OUTPATIENT
Start: 2020-12-31 | End: 2021-01-07 | Stop reason: HOSPADM

## 2020-12-31 RX ADMIN — ACETAMINOPHEN 650 MG: 650 SUPPOSITORY RECTAL at 02:04

## 2020-12-31 RX ADMIN — Medication 10 ML: at 10:16

## 2020-12-31 RX ADMIN — LANSOPRAZOLE 30 MG: KIT at 13:23

## 2020-12-31 RX ADMIN — DEXAMETHASONE 6 MG: 4 TABLET ORAL at 10:15

## 2020-12-31 RX ADMIN — POTASSIUM CHLORIDE 10 MEQ: 10 INJECTION, SOLUTION INTRAVENOUS at 10:16

## 2020-12-31 RX ADMIN — Medication 1.5 G: at 01:30

## 2020-12-31 RX ADMIN — METOPROLOL TARTRATE 25 MG: 25 TABLET, FILM COATED ORAL at 12:11

## 2020-12-31 RX ADMIN — POTASSIUM CHLORIDE 10 MEQ: 10 INJECTION, SOLUTION INTRAVENOUS at 08:13

## 2020-12-31 RX ADMIN — DEXMEDETOMIDINE HYDROCHLORIDE 0.4 MCG/KG/HR: 100 INJECTION, SOLUTION INTRAVENOUS at 11:35

## 2020-12-31 RX ADMIN — CLOPIDOGREL 75 MG: 75 TABLET, FILM COATED ORAL at 12:12

## 2020-12-31 RX ADMIN — REMDESIVIR 100 MG: 100 INJECTION, POWDER, LYOPHILIZED, FOR SOLUTION INTRAVENOUS at 22:10

## 2020-12-31 RX ADMIN — ALLOPURINOL 300 MG: 300 TABLET ORAL at 12:11

## 2020-12-31 RX ADMIN — PIPERACILLIN AND TAZOBACTAM 3.38 G: 3; .375 INJECTION, POWDER, LYOPHILIZED, FOR SOLUTION INTRAVENOUS at 11:57

## 2020-12-31 RX ADMIN — BUMETANIDE 1 MG: 0.25 INJECTION, SOLUTION INTRAMUSCULAR; INTRAVENOUS at 08:31

## 2020-12-31 RX ADMIN — FOLIC ACID 1 MG: 1 TABLET ORAL at 10:15

## 2020-12-31 RX ADMIN — ACETAMINOPHEN 650 MG: 325 TABLET ORAL at 16:12

## 2020-12-31 RX ADMIN — SODIUM CHLORIDE: 9 INJECTION, SOLUTION INTRAVENOUS at 08:12

## 2020-12-31 RX ADMIN — APIXABAN 5 MG: 5 TABLET, FILM COATED ORAL at 21:52

## 2020-12-31 RX ADMIN — ACETAMINOPHEN 650 MG: 325 TABLET ORAL at 10:14

## 2020-12-31 RX ADMIN — POTASSIUM CHLORIDE 10 MEQ: 10 INJECTION, SOLUTION INTRAVENOUS at 10:39

## 2020-12-31 RX ADMIN — HYDROCODONE BITARTRATE AND ACETAMINOPHEN 1 TABLET: 7.5; 325 TABLET ORAL at 10:14

## 2020-12-31 RX ADMIN — APIXABAN 5 MG: 5 TABLET, FILM COATED ORAL at 12:11

## 2020-12-31 RX ADMIN — PANCRELIPASE 36000 UNITS: 60000; 12000; 38000 CAPSULE, DELAYED RELEASE PELLETS ORAL at 16:11

## 2020-12-31 RX ADMIN — METOPROLOL TARTRATE 25 MG: 25 TABLET, FILM COATED ORAL at 21:52

## 2020-12-31 RX ADMIN — THIAMINE HYDROCHLORIDE 100 MG: 100 INJECTION, SOLUTION INTRAMUSCULAR; INTRAVENOUS at 08:31

## 2020-12-31 RX ADMIN — PANCRELIPASE 36000 UNITS: 60000; 12000; 38000 CAPSULE, DELAYED RELEASE PELLETS ORAL at 10:14

## 2020-12-31 RX ADMIN — SODIUM CHLORIDE: 9 INJECTION, SOLUTION INTRAVENOUS at 16:11

## 2020-12-31 RX ADMIN — PANCRELIPASE 36000 UNITS: 60000; 12000; 38000 CAPSULE, DELAYED RELEASE PELLETS ORAL at 12:12

## 2020-12-31 RX ADMIN — DEXMEDETOMIDINE HYDROCHLORIDE 0.4 MCG/KG/HR: 100 INJECTION, SOLUTION INTRAVENOUS at 22:09

## 2020-12-31 RX ADMIN — PIPERACILLIN AND TAZOBACTAM 3.38 G: 3; .375 INJECTION, POWDER, LYOPHILIZED, FOR SOLUTION INTRAVENOUS at 18:32

## 2020-12-31 RX ADMIN — PIPERACILLIN AND TAZOBACTAM 3.38 G: 3; .375 INJECTION, POWDER, LYOPHILIZED, FOR SOLUTION INTRAVENOUS at 03:19

## 2020-12-31 ASSESSMENT — PAIN DESCRIPTION - PAIN TYPE: TYPE: ACUTE PAIN

## 2020-12-31 ASSESSMENT — PAIN DESCRIPTION - PROGRESSION
CLINICAL_PROGRESSION: GRADUALLY WORSENING
CLINICAL_PROGRESSION: GRADUALLY WORSENING
CLINICAL_PROGRESSION: RESOLVED

## 2020-12-31 ASSESSMENT — PAIN SCALES - WONG BAKER
WONGBAKER_NUMERICALRESPONSE: 0

## 2020-12-31 ASSESSMENT — PAIN DESCRIPTION - DESCRIPTORS
DESCRIPTORS: ACHING
DESCRIPTORS: BURNING
DESCRIPTORS: BURNING

## 2020-12-31 ASSESSMENT — PAIN SCALES - GENERAL
PAINLEVEL_OUTOF10: 1
PAINLEVEL_OUTOF10: 0

## 2020-12-31 ASSESSMENT — PAIN DESCRIPTION - ONSET
ONSET: ON-GOING
ONSET: ON-GOING

## 2020-12-31 ASSESSMENT — PAIN DESCRIPTION - LOCATION
LOCATION: GENERALIZED
LOCATION: ARM

## 2020-12-31 NOTE — PROGRESS NOTES
PROGRESS NOTE       PATIENT PROBLEM LIST:  Active Problems:    DVT (deep venous thrombosis) (HCC)    CHF with unknown LVEF (HCC)    COVID-19    Hypokalemia    Hypomagnesemia    Fever and chills    Hypoxia    Acute hypoxemic respiratory failure due to severe acute respiratory syndrome coronavirus 2 (SARS-CoV-2) disease (HCC)  Resolved Problems:    * No resolved hospital problems. *      SUBJECTIVE:  Oralia Zelaya resting comfortably in no acute distress. Limited HPI due to not evaluating patient with Covid. REVIEW OF SYSTEMS:  Patient case was discussed with nursing and RT staff in an effort to preserve PPE and prevent spread of COVID-19. SCHEDULED MEDICATIONS:   thiamine  100 mg Intravenous Daily    lansoprazole  30 mg Per NG tube QAM AC    metoprolol tartrate  25 mg Oral BID    folic acid  1 mg Oral Daily    clopidogrel  75 mg Oral Daily    vancomycin  1,500 mg Intravenous Q24H    bumetanide  1 mg Intravenous Daily    remdesivir IVPB  100 mg Intravenous Q24H    piperacillin-tazobactam  3.375 g Intravenous Q8H    allopurinol  300 mg Oral Daily    apixaban  5 mg Oral BID    lipase-protease-amylase  36,000 Units Oral TID WC    sodium chloride flush  10 mL Intravenous 2 times per day    dexamethasone  6 mg Oral Daily       VITAL SIGNS:                                                                                                                          BP (!) 109/58   Pulse 96   Temp 101.8 °F (38.8 °C) (Bladder)   Resp 29   Ht 6' (1.829 m)   Wt 193 lb 5.5 oz (87.7 kg)   SpO2 99%   BMI 26.22 kg/m²   Patient Vitals for the past 96 hrs (Last 3 readings):   Weight   12/31/20 0520 193 lb 5.5 oz (87.7 kg)   12/30/20 0300 199 lb 15.3 oz (90.7 kg)   12/29/20 0600 201 lb (91.2 kg)     OBJECTIVE:  Due to the current efforts to prevent transmission of COVID-19 and also the need to preserve PPE for other caregivers, a face-to-face encounter with the patient was not performed.   That being said, all relevant records and diagnostic tests were reviewed, including laboratory results and imaging. Please reference any relevant documentation elsewhere. Care will be coordinated with the primary service. Data:   Scheduled Meds: Reviewed  Continuous Infusions:    sodium chloride 12.5 mL/hr at 12/31/20 0812    dexmedetomidine (PRECEDEX) IV infusion 0.4 mcg/kg/hr (12/31/20 1135)       Intake/Output Summary (Last 24 hours) at 12/31/2020 1221  Last data filed at 12/31/2020 1100  Gross per 24 hour   Intake 936.97 ml   Output 1200 ml   Net -263.03 ml     CBC:   Recent Labs     12/29/20  1225 12/29/20  2351 12/31/20  0525   WBC 7.2 6.8 4.6   HGB 11.7* 12.3* 10.8*   HCT 35.0* 36.9* 33.6*    257 209     BMP:  Recent Labs     12/28/20  1331 12/28/20  1331 12/29/20  1225 12/30/20  0145 12/31/20  0525   NA  --   --  135 139 139   K  --   --  3.5 3.4* 3.7   CL  --   --  100 101 103   CO2  --   --  23 23 27   BUN 17  --  16 17 21   CREATININE  --   --  1.0 1.2 1.3*   LABGLOM  --    < > >60 59 53    < > = values in this interval not displayed. ABGs:   Lab Results   Component Value Date    PH 7.452 12/31/2020    PO2 110.8 12/31/2020    PCO2 33.9 12/31/2020     INR:   Recent Labs     12/28/20  1331   INR 1.0     PRO-BNP:   Lab Results   Component Value Date    PROBNP 22,147 (H) 12/30/2020    PROBNP 2,583 (H) 12/28/2020      TSH:   Lab Results   Component Value Date    TSH 3.900 12/21/2019      Cardiac Injury Profile:   Recent Labs     12/30/20  0145 12/30/20  0840 12/30/20  1532   TROPONINI 0.15* 0.13* 0.12*      Lipid Profile:   Lab Results   Component Value Date    TRIG 107 12/21/2019    HDL 74 12/21/2019    LDLCALC 75 12/21/2019    CHOL 170 12/21/2019      Hemoglobin A1C: No components found for: HGBA1C     RAD:   Xr Ribs Left Include Chest (min 3 Views)    Result Date: 12/28/2020  EXAMINATION: 5 XRAY VIEWS OF THE LEFT RIBS WITH FRONTAL XRAY VIEW OF THE CHEST 12/28/2020 9:17 am COMPARISON: None.  HISTORY: ORDERING SYSTEM PROVIDED HISTORY: Rib pain after fall TECHNOLOGIST PROVIDED HISTORY: Reason for exam:->Rib pain after fall FINDINGS: There is no right or left rib fracture. Multiple views the left wrist were obtained. There is no left pneumothorax or lung contusion. Multifocal bilateral pulmonary infiltrates are noted more prominent within the right upper lobe consistent with pneumonia. The heart is enlarged. 1. There is no appreciable left rib fracture or pneumothorax 2. Multifocal pneumonia more prominent within the right upper lobe. Xr Chest Portable    Result Date: 2020  EXAMINATION: ONE XRAY VIEW OF THE CHEST 2020 10:24 pm COMPARISON: 2020 HISTORY: ORDERING SYSTEM PROVIDED HISTORY: hjypoxia TECHNOLOGIST PROVIDED HISTORY: Reason for exam:->hjypoxia FINDINGS: Increasing right lung airspace opacities compared to prior study. The left lung is clear. No sizable effusion or pneumothorax. Stable cardiomegaly. No gross osseous abnormalities. Increasing right lung airspace opacities compared to prior study. Xr Hip 2-3 Vw W Pelvis Left    Result Date: 2020  EXAMINATION: ONE XRAY VIEW OF THE PELVIS AND TWO XRAY VIEWS LEFT HIP for three views total 2020 9:17 am COMPARISON: None. HISTORY: ORDERING SYSTEM PROVIDED HISTORY: fall - pain TECHNOLOGIST PROVIDED HISTORY: Reason for exam:->fall - pain FINDINGS: No fracture or dislocation. The sacroiliac and hip joints appear preserved for age. Pelvic phleboliths. Lumbar spine levoscoliosis and degenerative spondylosis. Atherosclerotic calcifications. 1.  Negative for fracture or acute osseous abnormality.  2.  Chronic findings, as above    NM Cardiac Stress Test Nuclear Imaging    Patient MRN:  [id number]   : 1945   Age: 66 years   Gender: Male   Order Date:  2019 4:45 PM   EXAM: NM MYOCARDIAL SPECT REST EXERCISE OR RX   Number of Images: 9 views   INDICATION:  Chest pain    Reason for Exam?->Chest pain   Procedure Type->Rx    COMPARISON: None       TECHNIQUE:   10 mCi of Tc-99m MIBI was injected intravenously at rest and cardiac   SPECT images were performed. In addition 31 mCi of Tc-99m MIBI was   injected intravenously at maximum stress by using Lexiscan stress. Stress SPECT images and gated study were performed.        FINDINGS:   Perfusion images demonstrate no reversible perfusion defect. Wall motion is hypokinetic   The end diastolic volume is 625 ml. The end systolic volume is 77 ml. The estimated ejection fraction is 28 %. Impression   1. No reversible perfusion defect   2. Ejection fraction is 28 %. 3. Wall motion appears to be hypokinetic       EKG: See Report  Echo: 12/21/2019  Summary   Ejection fraction is visually estimated at 40-45%. LV diastolic dysfunction   Trileaflet aortic valve. Normal leaflet mobility. No aortic stenosis. No aortic regurgitation. The left atrium is mildly dilated. Mildly dilated aortic root. Physiologic and/or trace mitral regurgitation is present. No evidence of hemodynamically significant mitral stenosis. No evidence for hemodynamically significant pericardial effusion. Normal right ventricle structure and function. Normal tricuspid valve structure and function. IMPRESSIONS:  Active Problems:    DVT (deep venous thrombosis) ([facility])    CHF with unknown LVEF ([facility])    COVID-19    Hypokalemia    Hypomagnesemia    Fever and chills    Hypoxia    Acute hypoxemic respiratory failure due to severe acute respiratory syndrome coronavirus 2 (SARS-CoV-2) disease ([facility])  Resolved Problems:    * No resolved hospital problems. *      RECOMMENDATIONS:  Patient is in no acute distress and normal sinus rhythm on telemetry monitoring. Continue cardiac regimen and monitoring labs, and rhythm for arrhythmia.     I have spent more than 30 critical care minutes face to face with Marta Locks and reviewing notes and laboratory data, with greater than 50% of this

## 2020-12-31 NOTE — PROGRESS NOTES
Pharmacy Consultation Note  (Antibiotic Dosing and Monitoring)    Initial consult date: 2020  Consulting physician/provider: Dr. Harish Medellin  Drug(s): Vancomycin  Indication: Pneumonia    Ht Readings from Last 1 Encounters:   20 6' (1.829 m)     Wt Readings from Last 1 Encounters:   20 193 lb 5.5 oz (87.7 kg)       Age/Gender IBW DW  Allergy Information   66 y.o. male 77.6 kg 90.7 kg  Levofloxacin               Date  WBC BUN/CR Drug/Dose Time   Given Level(s)   (Time) Comments    6.8 17/1.2 Vancomycin 1 g IV x 1 dose  ----------  Vancomycin 750 mg IV x 1 dose 0256      4.6 21/1.3 Vancomycin 1500 mg IV Q24H 0130                         Estimated Creatinine Clearance: 52 mL/min (A) (based on SCr of 1.3 mg/dL (H)). Intake/Output Summary (Last 24 hours) at 2020 1215  Last data filed at 2020 1100  Gross per 24 hour   Intake 936.97 ml   Output 1200 ml   Net -263.03 ml       Temp max: Temp (24hrs), Av.8 °F (38.8 °C), Min:100.5 °F (38.1 °C), Max:102.8 °F (39.3 °C)      Cultures:  available culture and sensitivity results were reviewed in EPIC   S pneumoniae antigen - negative   Legionella antigen - negative   blood cx - no growth to date   urine cx - <10,000 GNR, mixed Gram positive organisms   blood cx - pending    Assessment:  · Patient is a 66year old male currently ordered vancomycin and piperacillin/tazobactam empirically  · Consulted by Dr. Cayden Garcia to dose/monitor vancomycin  · Goal trough level = 15-20 mcg/mL; goal AUC/VANCE = 400-600    Plan:  · Will continue vancomycin 1500 mg IV every 24 hours  · Will check trough level at steady state if vancomycin continues  · Pharmacist will follow and monitor/adjust dosing as necessary. Em Boyce, LauraD, BCPS 2020 12:16 PM   Ext: [phone]      ADDENDUM    Note vancomycin discontinued. Clinical pharmacy will sign-off. Please re-consult if we can be of further assistance.     Thanks for this consult,  Michael Rolon [facility], PharmD, BCPS  12/31/2020  5:02 PM

## 2020-12-31 NOTE — PROGRESS NOTES
Rickey Mccurdy Hospitalist   Progress Note    Admitting Date and Time: 12/28/2020  8:04 AM  Admit Dx: Acute hypoxemic respiratory failure due to severe acute respiratory syndrome coronavirus 2 (SARS-CoV-2) disease ([facility]) [U07.1, J96.01]    Subjective: Admitted on 28th, patient with known systolic heart failure, chronic leg DVT, alcohol use, presented with weakness, fatigue, cough, shortness of breath, poor appetite, change in taste. COVID-19 positive. Also electrolyte imbalance. Seen by cardiology, remains on his cardiac medications. Patient with respiratory distress and hypoxemia on 29th evening. Needed to go to ICU. Patient was admitted with Acute hypoxemic respiratory failure due to severe acute respiratory syndrome coronavirus 2 (SARS-CoV-2) disease ([facility] [U07.1, J96.01]. Patient noted resting, comfortable, sitting in ICU, on nasal cannula. Per RN: No new complaints. ROS: denies fever, chills, cp, sob, n/v, HA unless stated above.      thiamine  100 mg Intravenous Daily    lansoprazole  30 mg Per NG tube QAM AC    folic acid  1 mg Oral Daily    clopidogrel  75 mg Oral Daily    vancomycin  1,500 mg Intravenous Q24H    bumetanide  1 mg Intravenous Daily    remdesivir IVPB  100 mg Intravenous Q24H    piperacillin-tazobactam  3.375 g Intravenous Q8H    allopurinol  300 mg Oral Daily    apixaban  5 mg Oral BID    lipase-protease-amylase  36,000 Units Oral TID WC    metoprolol succinate  50 mg Oral Daily    sodium chloride flush  10 mL Intravenous 2 times per day    dexamethasone  6 mg Oral Daily         potassium chloride, 10 mEq, PRN      LORazepam, 1 mg, Q1H PRN    Or      LORazepam, 1 mg, Q1H PRN    Or      LORazepam, 2 mg, Q1H PRN    Or      LORazepam, 2 mg, Q1H PRN    Or      LORazepam, 3 mg, Q1H PRN    Or      LORazepam, 3 mg, Q1H PRN    Or      LORazepam, 4 mg, Q1H PRN    Or      LORazepam, 4 mg, Q1H PRN      sodium chloride, 30 mL, PRN     HYDROcodone-acetaminophen, 1 tablet, Q6H PRN      loperamide, 2 mg, 4x Daily PRN      sodium chloride flush, 10 mL, PRN      promethazine, 12.5 mg, Q6H PRN    Or      ondansetron, 4 mg, Q6H PRN      polyethylene glycol, 17 g, Daily PRN      acetaminophen, 650 mg, Q6H PRN    Or      acetaminophen, 650 mg, Q6H PRN         Objective:    BP (!) 140/85   Pulse 81   Temp 100.6 °F (38.1 °C) (Bladder)   Resp 26   Ht 6' (1.829 m)   Wt 193 lb 5.5 oz (87.7 kg)   SpO2 100%   BMI 26.22 kg/m²   General Appearance: alert and oriented to person, place and time, well-developed and well-nourished, in no acute distress  Skin: warm and dry, no rash or erythema  Head: normocephalic and atraumatic  Eyes: pupils equal, round, and reactive to light, extraocular eye movements intact, conjunctivae normal  ENT: tympanic membrane, external ear and ear canal normal bilaterally, oropharynx clear and moist with normal mucous membranes  Neck: neck supple and non tender without mass, no thyromegaly or thyroid nodules, no cervical lymphadenopathy   Pulmonary/Chest: clear to auscultation bilaterally- no wheezes, rales or rhonchi, normal air movement, no respiratory distress  Cardiovascular: normal rate, normal S1 and S2, no gallops, intact distal pulses and no carotid bruits  Abdomen: soft, non-tender, non-distended, normal bowel sounds, no masses or organomegaly  With patient in Covid isolation, examination in addition to personal encounter also includes a half talking with the nursing as well as other specialty physicians.     Recent Labs     12/29/20  1225 12/30/20  0145 12/31/20  0525    139 139   K 3.5 3.4* 3.7    101 103   CO2 23 23 27   BUN 16 17 21   CREATININE 1.0 1.2 1.3*   GLUCOSE 158* 138* 135*   CALCIUM 8.6 8.5* 8.0*       Recent Labs     12/29/20  1225 12/29/20  2351 12/31/20  0525   WBC 7.2 6.8 4.6   RBC 3.40* 3.54* 3.15*   HGB 11.7* 12.3* 10.8*   HCT 35.0* 36.9* 33.6*   .9* 104.2* 106.7*   MCH 34.4 34.7 34.3   MCHC 33.4 33.3 32.1   RDW 12.6 12.7 13.1    257 209   MPV 10.7 11.0 11.2       Radiology:   XR ABDOMEN FOR NG/OG/NE TUBE PLACEMENT   Final Result   NG tube is in the stomach      XR CHEST PORTABLE   Final Result   Increasing right lung airspace opacities compared to prior study. XR HIP 2-3 VW W PELVIS LEFT   Final Result   1. Negative for fracture or acute osseous abnormality. 2.  Chronic findings, as above      XR RIBS LEFT INCLUDE CHEST (MIN 3 VIEWS)   Final Result   1. There is no appreciable left rib fracture or pneumothorax   2. Multifocal pneumonia more prominent within the right upper lobe. XR CHEST PORTABLE    (Results Pending)       Assessment:    Active Problems:    DVT (deep venous thrombosis) ([facility])    CHF with unknown LVEF ([facility])    COVID-19    Hypokalemia    Hypomagnesemia    Fever and chills    Hypoxia    Acute hypoxemic respiratory failure due to severe acute respiratory syndrome coronavirus 2 (SARS-CoV-2) disease ([facility])  Resolved Problems:    * No resolved hospital problems. *      Plan:  1. Acute hypoxic respiratory failure, patient requiring supplemental oxygen. Maintaining saturations with supplemental oxygen. 2. More due to Covid pneumonia, patient remains on Decadron, folic acid, remdesivir, thiamine, apixaban. 3. Concern over possible aspiration pneumonia, remains on broad-spectrum coverage. 4. Known prior left lower extremity DVT, on anticoagulation with Eliquis. 5. Electrolyte imbalance, being replaced.         Electronically signed by Reno Devries MD on 12/31/2020 at 11:04 AM

## 2020-12-31 NOTE — CARE COORDINATION
COVID POSITIVE 12/28. On Day 3 RDV, iv abxs, Precedex drip. Still requiring continuous 60% Bipap- [employer] [facility] following-will need O2 testing/order if unable to wean off. From home independently w/ male friend Colletta Balo PTA. On Eliquis at home. Backdoor referral to Brandi Olson @ [facility]- will need to discuss discharge planning w/ patient again when able.   Will follow Jesus Owusu

## 2020-12-31 NOTE — PROGRESS NOTES
1555: Spoke with Pt's partner Jessy Waite regarding pt's status. He was updated and has no additional questions or concerns at this time.

## 2020-12-31 NOTE — PROGRESS NOTES
Critical Care Team - Daily Progress Note      Date and time: 12/31/2020 8:47 AM  Patient's name:  Josephine Velasco [facility] [place]  Medical Record Number: [id number]  Patient's account/billing number: [de-identified]  Patient's YOB: 1945  Age: 66 y.o. Date of Admission: 12/28/2020  8:04 AM  Length of stay during current admission: 3      Primary Care Physician: Randall Moreno MD  ICU Attending Physician: Dr. Je Regan Status: Full Code    Reason for ICU admission: Critical care management for acute hypoxic respiratory failure, alcohol withdrawal      SUBJECTIVE:   The patient is a 66 y.o. male with significant past medical history of DVT, CHF with ejection fraction of 40 to 45%, alcohol use. He came from home to the ER on 12/28 due to having several day onset of weakness, he was feeling fatigued, was having issues with cough, shortness of breath, is that he had been drinking the night before. He states he was standing at his counter and his legs gave out because he felt so weak, did not hit his head, when EMS arrived he was still on top of the counter, but his legs were out from under him. It was found in the emergency department that he was Covid positive, he had a an ESTUARDO, BNP was also elevated at 2583, had a mild transaminitis. He was transferred to the floor for further management. Last night while 12/29 rapid response was called as patient was desaturating on his cannula, was placed on nonrebreather, with still had increased work of breathing so he was transitioned to BiPAP. He did not tolerate the BiPAP due to anxiety and had to be transferred down to the the ICU to be placed on a Precedex drip. He was given empiric antibiotics for possible aspiration, and was also given diuretics for possible CHF exacerbation. Appears more comfortable in his room today, satting 100% on his BiPAP, Precedex running at 0.2.     OVERNIGHT EVENTS:       12/31-patient had episode of hypotension last night, but is now normotensive, Precedex being weaned.     AWAKE & FOLLOWING COMMANDS:  [] No   [x] Yes    CURRENT VENTILATION STATUS:     [] Ventilator  [x] BIPAP  [] Nasal Cannula [] Room Air      IF INTUBATED, ET TUBE MARKING AT LOWER LIP:       cms    SECRETIONS Amount:  [] Small [] Moderate  [] Large  [x] None  Color:     [] White [] Colored  [] Bloody    SEDATION:  RAAS Score:   [x] Precidex gtt  [] Versed gtt  [] Ativan gtt   [] No Sedation    PARALYZED:  [x] No    [] Yes    DIARRHEA:                [x] No                [] Yes  (C. Difficile status: [] positive                                                                                                                       [] negative                                                                                                                     [] pending)    VASOPRESSORS:  [x] No    [] Yes    If yes -   [] Levophed       [] Dopamine     [] Vasopressin       [] Dobutamine  [] Phenylephrine         [] Epinephrine    CENTRAL LINES:     [x] No   [] Yes   (Date of Insertion:   )           If yes -     [] Right IJ     [] Left IJ [] Right Femoral [] Left Femoral                   [] Right Subclavian [] Left Subclavian       PRADHAN'S CATHETER:   [] No   [x] Yes  (Date of Insertion:   )     URINE OUTPUT:            [x] Good   [] Low              [] Anuric      OBJECTIVE:     VITAL SIGNS:  BP (!) 140/85   Pulse 81   Temp 100.6 °F (38.1 °C) (Bladder)   Resp 26   Ht 6' (1.829 m)   Wt 193 lb 5.5 oz (87.7 kg)   SpO2 100%   BMI 26.22 kg/m²   Tmax over 24 hours:  Temp (24hrs), Av.6 °F (38.7 °C), Min:100.1 °F (37.8 °C), Max:102.8 °F (39.3 °C)      Patient Vitals for the past 6 hrs:   BP Temp Temp src Pulse Resp SpO2 Weight   20 0700 (!) 140/85 -- -- 81 26 100 % --   20 0600 135/72 100.6 °F (38.1 °C) Bladder 81 23 100 % --   20 0520 -- -- -- -- -- -- 193 lb 5.5 oz (87.7 kg)   20 0500 119/70 -- -- 74 26 100 % --   20 0400 100/63 100.9 °F at 12/31/20 0812    dexmedetomidine (PRECEDEX) IV infusion 0.399 mcg/kg/hr (12/30/20 1600)     PRN Meds:       potassium chloride, 10 mEq, PRN      LORazepam, 1 mg, Q1H PRN    Or      LORazepam, 1 mg, Q1H PRN    Or      LORazepam, 2 mg, Q1H PRN    Or      LORazepam, 2 mg, Q1H PRN    Or      LORazepam, 3 mg, Q1H PRN    Or      LORazepam, 3 mg, Q1H PRN    Or      LORazepam, 4 mg, Q1H PRN    Or      LORazepam, 4 mg, Q1H PRN      sodium chloride, 30 mL, PRN      HYDROcodone-acetaminophen, 1 tablet, Q6H PRN      loperamide, 2 mg, 4x Daily PRN      sodium chloride flush, 10 mL, PRN      promethazine, 12.5 mg, Q6H PRN    Or      ondansetron, 4 mg, Q6H PRN      polyethylene glycol, 17 g, Daily PRN      acetaminophen, 650 mg, Q6H PRN    Or      acetaminophen, 650 mg, Q6H PRN          VENT SETTINGS (Comprehensive) (if applicable):  Vent Information  Skin Assessment: Clean, dry, & intact  FiO2 : 60 %  SpO2: 100 %  SpO2/FiO2 ratio: 166.67  Mask Type: Full face mask  Mask Size: Medium  Additional Respiratory  Assessments  Pulse: 81  Resp: 26  SpO2: 100 %    ABGs:   Arterial Blood Gas result:  pO2 110.8; pCO2 33.9; pH 7.452;  HCO3 23.1, %O2 Sat 97.6.         Laboratory findings:    Complete Blood Count:   Recent Labs     12/29/20  1225 12/29/20  2351 12/31/20  0525   WBC 7.2 6.8 4.6   HGB 11.7* 12.3* 10.8*   HCT 35.0* 36.9* 33.6*    257 209        Last 3 Blood Glucose:   Recent Labs     12/29/20  1225 12/30/20  0145 12/31/20  0525   GLUCOSE 158* 138* 135*        PT/INR:    Lab Results   Component Value Date    PROTIME 12.0 12/28/2020    INR 1.0 12/28/2020     PTT:    Lab Results   Component Value Date    APTT 24.8 12/28/2020       Comprehensive Metabolic Profile:   Recent Labs     12/29/20  1225 12/30/20  0145 12/31/20  0525    139 139   K 3.5 3.4* 3.7    101 103   CO2 23 23 27   BUN 16 17 21   CREATININE 1.0 1.2 1.3*   GLUCOSE 158* 138* 135*   CALCIUM 8.6 8.5* 8.0*   PROT 7.2 7.1 6.3* LABALBU 3.4* 3.1* 2.9*   BILITOT 0.2 0.3 0.3   ALKPHOS 46 48 49   * 167* 81*   ALT 55* 52* 38      Magnesium:   Lab Results   Component Value Date    MG 2.5 12/31/2020     Phosphorus:   Lab Results   Component Value Date    PHOS 3.6 12/31/2020     Ionized Calcium: No results found for: CAION     Urinalysis:     Troponin:   Recent Labs     12/30/20  0145 12/30/20  0840 12/30/20  1532   TROPONINI 0.15* 0.13* 0.12*       Microbiology:    Cultures during this admission:     Blood cultures:                 [] None drawn      [] Negative             []  Positive (Details:  )  Urine Culture:                   [] None drawn      [] Negative             []  Positive (Details:  )  Sputum Culture:               [] None drawn       [] Negative             []  Positive (Details:  )   Endotracheal aspirate:     [] None drawn       [] Negative             []  Positive (Details:  )     Other pertinent Labs:       Radiology/Imaging:   Xr Ribs Left Include Chest (min 3 Views)    Result Date: 12/28/2020  EXAMINATION: 5 XRAY VIEWS OF THE LEFT RIBS WITH FRONTAL XRAY VIEW OF THE CHEST 12/28/2020 9:17 am COMPARISON: None. HISTORY: ORDERING SYSTEM PROVIDED HISTORY: Rib pain after fall TECHNOLOGIST PROVIDED HISTORY: Reason for exam:->Rib pain after fall FINDINGS: There is no right or left rib fracture. Multiple views the left wrist were obtained. There is no left pneumothorax or lung contusion. Multifocal bilateral pulmonary infiltrates are noted more prominent within the right upper lobe consistent with pneumonia. The heart is enlarged. 1. There is no appreciable left rib fracture or pneumothorax 2. Multifocal pneumonia more prominent within the right upper lobe.     Xr Chest Portable    Result Date: 12/30/2020  EXAMINATION: ONE XRAY VIEW OF THE CHEST 12/29/2020 10:24 pm COMPARISON: 12/20/2020 HISTORY: ORDERING SYSTEM PROVIDED HISTORY: hjypoxia TECHNOLOGIST PROVIDED HISTORY: Reason for exam:->hjypoxia FINDINGS: Increasing right lung airspace opacities compared to prior study. The left lung is clear. No sizable effusion or pneumothorax. Stable cardiomegaly. No gross osseous abnormalities. Increasing right lung airspace opacities compared to prior study. Xr Hip 2-3 Vw W Pelvis Left    Result Date: 12/28/2020  EXAMINATION: ONE XRAY VIEW OF THE PELVIS AND TWO XRAY VIEWS LEFT HIP for three views total 12/28/2020 9:17 am COMPARISON: None. HISTORY: ORDERING SYSTEM PROVIDED HISTORY: fall - pain TECHNOLOGIST PROVIDED HISTORY: Reason for exam:->fall - pain FINDINGS: No fracture or dislocation. The sacroiliac and hip joints appear preserved for age. Pelvic phleboliths. Lumbar spine levoscoliosis and degenerative spondylosis. Atherosclerotic calcifications. 1.  Negative for fracture or acute osseous abnormality. 2.  Chronic findings, as above       Chest Xray (12/31/2020):    ASSESSMENT:     Patient Active Problem List    Diagnosis Date Noted    COVID-19 12/28/2020    Hypokalemia 12/28/2020    Hypomagnesemia 12/28/2020    Fever and chills 12/28/2020    Hypoxia 12/28/2020    Acute hypoxemic respiratory failure due to severe acute respiratory syndrome coronavirus 2 (SARS-CoV-2) disease ([facility] [address] 12/28/2020    Chest pain 12/22/2019    DVT (deep venous thrombosis) ([facility] 12/20/2019    CHF with unknown LVEF ([facility] 12/20/2019       Additional assessment:    ·         PLAN:     WEAN PER PROTOCOL:  [] No   [x] Yes  [] N/A    DISCONTINUE ANY LABS:   [x] No   [] Yes    ICU PROPHYLAXIS:  Stress ulcer:  [x] PPI Agent  [] [id number] [] Sucralfate  [] Other:  VTE:   [x] Eliquis  [] Unfract.  Heparin Subcut  [] EPC Cuffs    NUTRITION:  [x] NPO [] Tube Feeding (Specify: ) [] TPN  [] PO (Diet: Diet NPO Effective Now)    HOME MEDICATIONS RECONCILED: [x] No  [] Yes    INSULIN DRIP:   [x] No   [] Yes    CONSULTATION NEEDED:  [] No   [x] Yes    FAMILY UPDATED:    [] No   [x] Yes    TRANSFER OUT OF ICU:   [x] No   [] Yes    ADDITIONAL PLAN:    Neuro Agitation/ DT's-likely related to hypoxia, patient is calmer now that his sats have improved, and he is on Precedex, wean sedation as tolerated  phenobarbitol to help control DT  ciwa protocol     Respiratory   Acute hypoxic respiratory failure-likely related to COVID-19 pneumonia, will continue to monitor, wean BiPAP as tolerated     Cardiovascular   Monitor for any signs of bradycardia as patient is on Precedex  History of CHF-monitor I's and O's, chest x-rays to evaluate for fluid overload  Troponin elevation- downtrending, likely demand ischemia, no active chest pain     Gastrointestinal   Protonix 40 mg     Renal   ESTUARDO-creatinine 1.2 12/30, was 1.0, may be related to Lasix, or cardiorenal syndrome     Infectious Disease   COVID-19 pneumonia-Decadron, remdesivir, empiric antibiotics for aspiration, may require tocilizumab     Hematology/Oncology   Eliquis for DVT     Endocrine   We will have to monitor pedal status patient tolerating off BiPAP     Social/Spiritual/DNR/Other   Full code     ASSESSMENT/ PLAN   1. Continue home bumex IV  2. Phenobarb load 12/30, CIWA protocol  3. D/c precidex  4. Continue home medications  5. Vanc and zosyn for empiric coverage of PNA  6. Remdesivir, decadron for COVID  7. Change to toprol tartrate for NG  8. Central line placement for access   9. Continue ICU level of care    Keseha Sanches MD PGY-1  12/31/2020 9:12 AM    I personally saw, examined and provided care for the patient. Radiographs, labs and medication list were reviewed by me independently. I spoke with bedside nursing, therapists and consultants. Critical care services and times documented are independent of procedures and multidisciplinary rounds with Residents. Additionally comprehensive, multidisciplinary rounds were conducted with the MICU team. The case was discussed in detail and plans for care were established. Review of Residents documentation was conducted and revisions were made as appropriate.  I agree with the above documented exam, problem list and plan of care. Stephanie Salvador M.D.    Pulmonary/Critical Care Medicine   38 min cct excluding procedures

## 2020-12-31 NOTE — PROGRESS NOTES
Date: 12/31/2020    Time: 12:26 AM    Patient Placed On BIPAP/CPAP/ Non-Invasive Ventilation? Yes    If no must comment. Facial area red/color change? No           If YES are Blister/Lesion present? No   If yes must notify nursing staff  BIPAP/CPAP skin barrier?   Yes    Skin barrier type:mepilexlite       Comments:        Brittany Bang

## 2020-12-31 NOTE — FLOWSHEET NOTE
Intensive Care Daily Quality Rounding Checklist        ICU Team Members: Dr. Ryan León, Dr. Edward Kelley (resident), clinical pharmacist, charge nurse, bedside nurse, respiratory therapist     ICU Day #: 3     Intubation Date: N/A     Ventilator Day #: N/A     Central Line Insertion Date: N/A                                                    Day #: N/A      Arterial Line Insertion Date: N/A                             Day #: N/A     DVT Prophylaxis: on Eliquis    GI Prophylaxis: Protonix     Beckett Catheter Insertion Date: 12/29/2020                                        Day #: 3                             Continued need (if yes, reason documented and discussed with physician): YES (strict I+O in critical care patient)     Skin Issues/ Wounds and ordered treatment discussed on rounds: no issues     Goals/ Plans for the Day: Daily labs, wean O2 as able, continue Precedex/ CIWA, Bipap QHS and PRN, NG tube for meds

## 2020-12-31 NOTE — PROGRESS NOTES
12/31/2020  10:39 AM      Comprehensive Nutrition Assessment    Type and Reason for Visit:  Initial(ICU LOS)    Nutrition Recommendations/Plan:  If pt is unable to tolerate PO 2/2 respiratory status, recommend consider EN support    Nutrition Assessment:  Pt admit 2/2 Covid/severe ARDS. Currently on BiPap and NPO. Will continue to monitor progress. May recommend EN support, if pt remains unable to michael PO 2/2 NIV. Malnutrition Assessment:  Malnutrition Status: At risk for malnutrition (Comment)    Context:  Acute Illness     Findings of the 6 clinical characteristics of malnutrition:  Energy Intake:  Mild decrease in energy intake (Comment)(NPO now)  Weight Loss:  No significant weight loss     Body Fat Loss:  Unable to assess(Covid Isolation)     Muscle Mass Loss:  Unable to assess(Covid isolation)    Fluid Accumulation:  No significant fluid accumulation     Strength:  Not Performed    Estimated Daily Nutrient Needs:  Energy (kcal):  ; Weight Used for Energy Requirements:  Current     Protein (g):  105-125 (1.3-1.5 g/kg); Weight Used for Protein Requirements:  Ideal        Fluid (ml/day):  Critical Care Management; Method Used for Fluid Requirements:  Other (Comment)      Nutrition Related Findings:  A&Ox2, trace edema, BiPAP, abd WDL, I/O WNL      Wounds:  None       Current Nutrition Therapies:    Diet NPO Effective Now    Anthropometric Measures:  · Height: 6' (182.9 cm)  · Current Body Weight: 193 lb (87.5 kg)(12/31)   · Admission Body Weight: 199 lb (90.3 kg)(12/30)    · Usual Body Weight: 194 lb (88 kg)(per EMR x 1 yr inpt admit)     · Ideal Body Weight: 178 lbs; % Ideal Body Weight 108.4 %   · BMI: 26.2  · BMI Categories: Overweight (BMI 25.0-29. 9)       Nutrition Diagnosis:   · Inadequate oral intake related to impaired respiratory function(2/2 ARDS/Covid+) as evidenced by NPO or clear liquid status due to medical condition      Nutrition Interventions:   Food and/or Nutrient Delivery: Continue NPO(ADAT when medically feasible or consider EN support)  Nutrition Education/Counseling:  Education not indicated   Coordination of Nutrition Care:  Continue to monitor while inpatient    Goals:  Nutrition progression       Nutrition Monitoring and Evaluation:   Behavioral-Environmental Outcomes:  None Identified   Food/Nutrient Intake Outcomes:  Diet Advancement/Tolerance  Physical Signs/Symptoms Outcomes:  Biochemical Data, GI Status, Fluid Status or Edema, Weight, Skin, Nutrition Focused Physical Findings     Discharge Planning:     Too soon to determine     Electronically signed by Sherlyn Lam RD, CNSC, LD on 12/31/20 at 10:39 AM EST    Contact: [phone]

## 2020-12-31 NOTE — PROCEDURES
Peripheral Line Placement Procedure Note    Indication: Poor peripheral access, lack of vascular access    Consent: The patient provided verbal consent for this procedure. Procedure: The patient was positioned appropriately and the skin over the right arm was prepped with betadine. A tourniquet was placed proximal to the vein's location and ultrasound was utilized to identify the vein. An 18 gauge angiocath was inserted into the vein with ultrasound guidance, with the catheter being visualized as it was advanced within the vein. Blood was obtained from the venous access site if needed and the site was secured with a leur lock and a tegaderm adhesive bandage. The patient tolerated the procedure well.     Complications: None    Procedure performed by Omar Storm MD at 12/31/2020 at 1:31 PM    Omar Storm MD PGY-1  12/31/2020 1:32 PM

## 2021-01-01 ENCOUNTER — APPOINTMENT (OUTPATIENT)
Dept: GENERAL RADIOLOGY | Age: 76
DRG: 871 | End: 2021-01-01
Payer: MEDICARE

## 2021-01-01 LAB
ALBUMIN SERPL-MCNC: 2.9 G/DL (ref 3.5–5.2)
ALP BLD-CCNC: 50 U/L (ref 40–129)
ALT SERPL-CCNC: 31 U/L (ref 0–40)
ANION GAP SERPL CALCULATED.3IONS-SCNC: 19 MMOL/L (ref 7–16)
AST SERPL-CCNC: 62 U/L (ref 0–39)
B.E.: 0 MMOL/L (ref -3–3)
B.E.: 0.1 MMOL/L (ref -3–3)
BASOPHILS ABSOLUTE: 0 E9/L (ref 0–0.2)
BASOPHILS RELATIVE PERCENT: 0 % (ref 0–2)
BILIRUB SERPL-MCNC: 0.3 MG/DL (ref 0–1.2)
BUN BLDV-MCNC: 30 MG/DL (ref 8–23)
CALCIUM SERPL-MCNC: 8.3 MG/DL (ref 8.6–10.2)
CHLORIDE BLD-SCNC: 96 MMOL/L (ref 98–107)
CO2: 28 MMOL/L (ref 22–29)
COHB: 0.2 % (ref 0–1.5)
COHB: 0.4 % (ref 0–1.5)
CREAT SERPL-MCNC: 1.3 MG/DL (ref 0.7–1.2)
CRITICAL: ABNORMAL
CRITICAL: ABNORMAL
DATE ANALYZED: ABNORMAL
DATE ANALYZED: ABNORMAL
DATE OF COLLECTION: ABNORMAL
DATE OF COLLECTION: ABNORMAL
EOSINOPHILS ABSOLUTE: 0 E9/L (ref 0.05–0.5)
EOSINOPHILS RELATIVE PERCENT: 0 % (ref 0–6)
GFR AFRICAN AMERICAN: >60
GFR NON-AFRICAN AMERICAN: 53 ML/MIN/1.73
GLUCOSE BLD-MCNC: 143 MG/DL (ref 74–99)
HCO3: 23.3 MMOL/L (ref 22–26)
HCO3: 23.8 MMOL/L (ref 22–26)
HCT VFR BLD CALC: 33.8 % (ref 37–54)
HEMOGLOBIN: 10.7 G/DL (ref 12.5–16.5)
HHB: 11 % (ref 0–5)
HHB: 7.5 % (ref 0–5)
IMMATURE GRANULOCYTES #: 0.03 E9/L
IMMATURE GRANULOCYTES %: 0.6 % (ref 0–5)
LAB: ABNORMAL
LAB: ABNORMAL
LYMPHOCYTES ABSOLUTE: 0.67 E9/L (ref 1.5–4)
LYMPHOCYTES RELATIVE PERCENT: 13.6 % (ref 20–42)
Lab: ABNORMAL
Lab: ABNORMAL
MAGNESIUM: 2.3 MG/DL (ref 1.6–2.6)
MCH RBC QN AUTO: 34.1 PG (ref 26–35)
MCHC RBC AUTO-ENTMCNC: 31.7 % (ref 32–34.5)
MCV RBC AUTO: 107.6 FL (ref 80–99.9)
METHB: 0.1 % (ref 0–1.5)
METHB: 0.1 % (ref 0–1.5)
MODE: ABNORMAL
MODE: ABNORMAL
MONOCYTES ABSOLUTE: 0.2 E9/L (ref 0.1–0.95)
MONOCYTES RELATIVE PERCENT: 4.1 % (ref 2–12)
NEUTROPHILS ABSOLUTE: 4.03 E9/L (ref 1.8–7.3)
NEUTROPHILS RELATIVE PERCENT: 81.7 % (ref 43–80)
O2 CONTENT: 14.5 ML/DL
O2 CONTENT: 14.8 ML/DL
O2 SATURATION: 88.9 % (ref 92–98.5)
O2 SATURATION: 92.5 % (ref 92–98.5)
O2HB: 88.5 % (ref 94–97)
O2HB: 92.2 % (ref 94–97)
OPERATOR ID: 420
OPERATOR ID: ABNORMAL
PATIENT TEMP: 37 C
PATIENT TEMP: 37 C
PCO2: 33.2 MMHG (ref 35–45)
PCO2: 35.4 MMHG (ref 35–45)
PDW BLD-RTO: 12.9 FL (ref 11.5–15)
PH BLOOD GAS: 7.45 (ref 7.35–7.45)
PH BLOOD GAS: 7.46 (ref 7.35–7.45)
PHOSPHORUS: 4.3 MG/DL (ref 2.5–4.5)
PLATELET # BLD: 237 E9/L (ref 130–450)
PMV BLD AUTO: 11.6 FL (ref 7–12)
PO2: 57.9 MMHG (ref 75–100)
PO2: 66.9 MMHG (ref 75–100)
POTASSIUM SERPL-SCNC: 4.3 MMOL/L (ref 3.5–5)
RBC # BLD: 3.14 E12/L (ref 3.8–5.8)
SODIUM BLD-SCNC: 143 MMOL/L (ref 132–146)
SOURCE, BLOOD GAS: ABNORMAL
SOURCE, BLOOD GAS: ABNORMAL
THB: 11.4 G/DL (ref 11.5–16.5)
THB: 11.6 G/DL (ref 11.5–16.5)
TIME ANALYZED: 638
TIME ANALYZED: 838
TOTAL PROTEIN: 6.5 G/DL (ref 6.4–8.3)
WBC # BLD: 4.9 E9/L (ref 4.5–11.5)

## 2021-01-01 PROCEDURE — 36600 WITHDRAWAL OF ARTERIAL BLOOD: CPT

## 2021-01-01 PROCEDURE — 2580000003 HC RX 258: Performed by: CLINICAL NURSE SPECIALIST

## 2021-01-01 PROCEDURE — 6370000000 HC RX 637 (ALT 250 FOR IP): Performed by: CLINICAL NURSE SPECIALIST

## 2021-01-01 PROCEDURE — 6370000000 HC RX 637 (ALT 250 FOR IP): Performed by: INTERNAL MEDICINE

## 2021-01-01 PROCEDURE — 2580000003 HC RX 258: Performed by: EMERGENCY MEDICINE

## 2021-01-01 PROCEDURE — 80053 COMPREHEN METABOLIC PANEL: CPT

## 2021-01-01 PROCEDURE — 2580000003 HC RX 258: Performed by: INTERNAL MEDICINE

## 2021-01-01 PROCEDURE — 2500000003 HC RX 250 WO HCPCS: Performed by: INTERNAL MEDICINE

## 2021-01-01 PROCEDURE — 6360000002 HC RX W HCPCS: Performed by: STUDENT IN AN ORGANIZED HEALTH CARE EDUCATION/TRAINING PROGRAM

## 2021-01-01 PROCEDURE — 6360000002 HC RX W HCPCS: Performed by: EMERGENCY MEDICINE

## 2021-01-01 PROCEDURE — 84100 ASSAY OF PHOSPHORUS: CPT

## 2021-01-01 PROCEDURE — 99232 SBSQ HOSP IP/OBS MODERATE 35: CPT | Performed by: INTERNAL MEDICINE

## 2021-01-01 PROCEDURE — 2000000000 HC ICU R&B

## 2021-01-01 PROCEDURE — 6360000002 HC RX W HCPCS: Performed by: CLINICAL NURSE SPECIALIST

## 2021-01-01 PROCEDURE — 36415 COLL VENOUS BLD VENIPUNCTURE: CPT

## 2021-01-01 PROCEDURE — 83735 ASSAY OF MAGNESIUM: CPT

## 2021-01-01 PROCEDURE — 85025 COMPLETE CBC W/AUTO DIFF WBC: CPT

## 2021-01-01 PROCEDURE — 94660 CPAP INITIATION&MGMT: CPT

## 2021-01-01 PROCEDURE — 6360000002 HC RX W HCPCS: Performed by: INTERNAL MEDICINE

## 2021-01-01 PROCEDURE — 82805 BLOOD GASES W/O2 SATURATION: CPT

## 2021-01-01 PROCEDURE — 71045 X-RAY EXAM CHEST 1 VIEW: CPT

## 2021-01-01 PROCEDURE — 2700000000 HC OXYGEN THERAPY PER DAY

## 2021-01-01 RX ADMIN — DEXMEDETOMIDINE HYDROCHLORIDE 0.4 MCG/KG/HR: 100 INJECTION, SOLUTION INTRAVENOUS at 09:12

## 2021-01-01 RX ADMIN — PIPERACILLIN AND TAZOBACTAM 3.38 G: 3; .375 INJECTION, POWDER, LYOPHILIZED, FOR SOLUTION INTRAVENOUS at 11:00

## 2021-01-01 RX ADMIN — DEXAMETHASONE 6 MG: 4 TABLET ORAL at 08:56

## 2021-01-01 RX ADMIN — CALCIUM GLUCONATE 1 G: 98 INJECTION, SOLUTION INTRAVENOUS at 07:53

## 2021-01-01 RX ADMIN — BUMETANIDE 1 MG: 0.25 INJECTION, SOLUTION INTRAMUSCULAR; INTRAVENOUS at 08:55

## 2021-01-01 RX ADMIN — THIAMINE HYDROCHLORIDE 100 MG: 100 INJECTION, SOLUTION INTRAMUSCULAR; INTRAVENOUS at 08:55

## 2021-01-01 RX ADMIN — CLOTRIMAZOLE: 10 CREAM TOPICAL at 21:10

## 2021-01-01 RX ADMIN — Medication 10 ML: at 21:10

## 2021-01-01 RX ADMIN — ALLOPURINOL 300 MG: 300 TABLET ORAL at 08:55

## 2021-01-01 RX ADMIN — REMDESIVIR 100 MG: 100 INJECTION, POWDER, LYOPHILIZED, FOR SOLUTION INTRAVENOUS at 22:00

## 2021-01-01 RX ADMIN — PIPERACILLIN AND TAZOBACTAM 3.38 G: 3; .375 INJECTION, POWDER, LYOPHILIZED, FOR SOLUTION INTRAVENOUS at 18:36

## 2021-01-01 RX ADMIN — PANCRELIPASE 36000 UNITS: 60000; 12000; 38000 CAPSULE, DELAYED RELEASE PELLETS ORAL at 08:00

## 2021-01-01 RX ADMIN — METOPROLOL TARTRATE 25 MG: 25 TABLET, FILM COATED ORAL at 08:56

## 2021-01-01 RX ADMIN — FOLIC ACID 1 MG: 1 TABLET ORAL at 08:55

## 2021-01-01 RX ADMIN — SODIUM CHLORIDE: 9 INJECTION, SOLUTION INTRAVENOUS at 23:19

## 2021-01-01 RX ADMIN — APIXABAN 5 MG: 5 TABLET, FILM COATED ORAL at 08:55

## 2021-01-01 RX ADMIN — PANCRELIPASE 36000 UNITS: 60000; 12000; 38000 CAPSULE, DELAYED RELEASE PELLETS ORAL at 17:00

## 2021-01-01 RX ADMIN — SODIUM CHLORIDE: 9 INJECTION, SOLUTION INTRAVENOUS at 08:00

## 2021-01-01 RX ADMIN — PIPERACILLIN AND TAZOBACTAM 3.38 G: 3; .375 INJECTION, POWDER, LYOPHILIZED, FOR SOLUTION INTRAVENOUS at 02:10

## 2021-01-01 RX ADMIN — METOPROLOL TARTRATE 25 MG: 25 TABLET, FILM COATED ORAL at 21:10

## 2021-01-01 RX ADMIN — HYDROCODONE BITARTRATE AND ACETAMINOPHEN 1 TABLET: 7.5; 325 TABLET ORAL at 11:48

## 2021-01-01 RX ADMIN — CLOTRIMAZOLE: 10 CREAM TOPICAL at 08:56

## 2021-01-01 RX ADMIN — CLOPIDOGREL 75 MG: 75 TABLET, FILM COATED ORAL at 08:55

## 2021-01-01 RX ADMIN — PANCRELIPASE 36000 UNITS: 60000; 12000; 38000 CAPSULE, DELAYED RELEASE PELLETS ORAL at 12:00

## 2021-01-01 RX ADMIN — APIXABAN 5 MG: 5 TABLET, FILM COATED ORAL at 21:09

## 2021-01-01 RX ADMIN — Medication 10 ML: at 08:56

## 2021-01-01 RX ADMIN — LANSOPRAZOLE 30 MG: KIT at 05:39

## 2021-01-01 ASSESSMENT — PAIN DESCRIPTION - PROGRESSION
CLINICAL_PROGRESSION: NOT CHANGED
CLINICAL_PROGRESSION: NOT CHANGED
CLINICAL_PROGRESSION: GRADUALLY WORSENING
CLINICAL_PROGRESSION: NOT CHANGED
CLINICAL_PROGRESSION: NOT CHANGED

## 2021-01-01 ASSESSMENT — PAIN SCALES - WONG BAKER
WONGBAKER_NUMERICALRESPONSE: 0
WONGBAKER_NUMERICALRESPONSE: 0

## 2021-01-01 ASSESSMENT — PAIN SCALES - GENERAL: PAINLEVEL_OUTOF10: 0

## 2021-01-01 NOTE — CONSULTS
1/1/2021  11:44 AM      Nutrition Consult:    Type and Reason for Visit:  Consult(12/31 consult for Tube Feeding Ordering and Management)    Nutrition Recommendations/Plan: Ordered TF to meet patient needs:    TF ORDERED: Immune Enhancing TF (Pivot 1.5) to goal rate 55 ml/hr (flushes per Critical Care)  This will provide: 1320 ml/d, 1980 keenan, 124 g pro, 1002 ml/d  free water      Malnutrition Assessment:  Malnutrition Status: At risk for malnutrition (Comment)    Context:  Acute Illness     Findings of the 6 clinical characteristics of malnutrition:  Energy Intake:  Mild decrease in energy intake (Comment)(NPO now)  Weight Loss:  No significant weight loss     Body Fat Loss:  Unable to assess(Covid Isolation)     Muscle Mass Loss:  Unable to assess(Covid isolation)    Fluid Accumulation:  No significant fluid accumulation     Strength:  Not Performed    Estimated Daily Nutrient Needs:  Energy (kcal):  ; Weight Used for Energy Requirements:  Current     Protein (g):  105-125 (1.3-1.5 g/kg); Weight Used for Protein Requirements:  Ideal        Fluid (ml/day):  Critical Care Management; Method Used for Fluid Requirements:  Other (Comment)      Nutrition Related Findings:  A&Ox2, trace edema, BiPAP, abd WDL, I/O WNL      Wounds:  None       Current Nutrition Therapies:    Diet NPO Effective Now    Anthropometric Measures:  · Height: 6' (182.9 cm)  · Current Body Weight: 193 lb (87.5 kg)(12/31)   · Admission Body Weight: 199 lb (90.3 kg)(12/30)    · Usual Body Weight: 194 lb (88 kg)(per EMR x 1 yr inpt admit)     · Ideal Body Weight: 178 lbs; % Ideal Body Weight 108.4 %   · BMI: 26.2  · BMI Categories: Overweight (BMI 25.0-29. 9)       Discharge Planning:     Too soon to determine     Electronically signed by Rod Melendez RD, CNSC, LD on 1/1/21 at 11:45 AM EST    Contact: [phone]

## 2021-01-01 NOTE — PROGRESS NOTES
Rickey Mccurdy Hospitalist   Progress Note    Admitting Date and Time: 12/28/2020  8:04 AM  Admit Dx: Acute hypoxemic respiratory failure due to severe acute respiratory syndrome coronavirus 2 (SARS-CoV-2) disease ([facility]) [U07.1, J96.01]    Subjective: Admitted on 28th, patient with known systolic heart failure, chronic leg DVT, alcohol use, presented with weakness, fatigue, cough, shortness of breath, poor appetite, change in taste. COVID-19 positive. Also electrolyte imbalance. Seen by cardiology, remains on his cardiac medications. Patient with respiratory distress and hypoxemia on 29th evening. Needed to go to ICU. Patient was admitted with Acute hypoxemic respiratory failure due to severe acute respiratory syndrome coronavirus 2 (SARS-CoV-2) disease ([facility]) [U07.1, J96.01]Patient noted resting, comfortable, sitting in ICU, on nasal cannula. Has been taking ice chips without any difficulty. Per RN: No new complaints. Likely will be started on tube feeds, does have NG. Still on Precedex drip. Once patient is off Precedex possibly going out of the ICU.    ROS: denies fever, chills, cp, sob, n/v, HA unless stated above.      thiamine  100 mg Intravenous Daily    lansoprazole  30 mg Per NG tube QAM AC    metoprolol tartrate  25 mg Oral BID    clotrimazole   Topical BID    lidocaine PF  5 mL Intradermal Once    heparin flush  3 mL Intravenous 2 times per day    folic acid  1 mg Oral Daily    clopidogrel  75 mg Oral Daily    bumetanide  1 mg Intravenous Daily    remdesivir IVPB  100 mg Intravenous Q24H    piperacillin-tazobactam  3.375 g Intravenous Q8H    allopurinol  300 mg Oral Daily    apixaban  5 mg Oral BID    lipase-protease-amylase  36,000 Units Oral TID WC    sodium chloride flush  10 mL Intravenous 2 times per day    dexamethasone  6 mg Oral Daily         sodium chloride flush, 10 mL, PRN      heparin flush, 3 mL, PRN      potassium chloride, 10 mEq, PRN     LORazepam, 1 mg, Q1H PRN    Or      LORazepam, 1 mg, Q1H PRN    Or      LORazepam, 2 mg, Q1H PRN    Or      LORazepam, 2 mg, Q1H PRN    Or      LORazepam, 3 mg, Q1H PRN    Or      LORazepam, 3 mg, Q1H PRN    Or      LORazepam, 4 mg, Q1H PRN    Or      LORazepam, 4 mg, Q1H PRN      sodium chloride, 30 mL, PRN      HYDROcodone-acetaminophen, 1 tablet, Q6H PRN      loperamide, 2 mg, 4x Daily PRN      sodium chloride flush, 10 mL, PRN      promethazine, 12.5 mg, Q6H PRN    Or      ondansetron, 4 mg, Q6H PRN      polyethylene glycol, 17 g, Daily PRN      acetaminophen, 650 mg, Q6H PRN    Or      acetaminophen, 650 mg, Q6H PRN         Objective:    BP (!) 99/51   Pulse 61   Temp 98.6 °F (37 °C) (Core)   Resp 29   Ht 6' (1.829 m)   Wt 193 lb 5.5 oz (87.7 kg)   SpO2 94%   BMI 26.22 kg/m²   General Appearance: alert and oriented to person, place and time, well-developed and well-nourished, in no acute distress  Skin: warm and dry, no rash or erythema  Head: normocephalic and atraumatic  Eyes: pupils equal, round, and reactive to light, extraocular eye movements intact, conjunctivae normal  ENT: tympanic membrane, external ear and ear canal normal bilaterally, oropharynx clear and moist with normal mucous membranes  Neck: neck supple and non tender without mass, no thyromegaly or thyroid nodules, no cervical lymphadenopathy   Pulmonary/Chest: clear to auscultation bilaterally- no wheezes, rales or rhonchi, normal air movement, no respiratory distress  Cardiovascular: normal rate, normal S1 and S2, no gallops, intact distal pulses and no carotid bruits  Abdomen: soft, non-tender, non-distended, normal bowel sounds, no masses or organomegaly  With patient in Covid isolation, examination in addition to personal encounter also includes a information from talking with the nursing as well as other specialty physicians.     Recent Labs     12/30/20  0145 12/31/20  0525 01/01/21  0550    139 143   K 3.4* replaced.         Electronically signed by Tenisha Cameron MD on 1/1/2021 at 9:46 AM

## 2021-01-01 NOTE — PROGRESS NOTES
Physician Progress Note      PATIENT:               David Sebastian  CSN #:                  [id number]  :                       1945  ADMIT DATE:       2020 8:04 AM  DISCH DATE:  RESPONDING  PROVIDER #:        Mirta Esquivel MD          QUERY TEXT:    Pt admitted with COVID pneumonia. If possible, please document in the progress   notes and discharge summary if you are evaluating and/or treating any of the   following: The medical record reflects the following:  Risk Factors: +COVID  Clinical Indicators: lactic 3.6, WBC 4.7, Temp 100.4-104, , per ICU   \". Herlinda Conner Critical care management for septic shock. Herlinda Conner Acute hypoxic respiratory   failure-likely related to COVID-19 pneumonia. Herlinda Conner Abx for aspiration pna. Herlinda Conner \"  Treatment: IVF, transferred to ICU    Thank you,  Devi Yang RN, BSN, CDIS  Clinical Documentation Improvement  [email or web address]  Options provided:  -- Sepsis, present on admission  -- Sepsis, not present on admission  -- No Sepsis, COVID pneumonia only  -- Other - I will add my own diagnosis  -- Disagree - Not applicable / Not valid  -- Disagree - Clinically unable to determine / Unknown  -- Refer to Clinical Documentation Reviewer    PROVIDER RESPONSE TEXT:    This patient has sepsis which was present on admission.     Query created by: Yolette Fish on 2020 9:13 AM      Electronically signed by:  Mirta Esquivel MD 2021 3:43 PM

## 2021-01-01 NOTE — FLOWSHEET NOTE
Intensive Care Daily Quality Rounding Checklist        ICU Team Members:        ICU Day #: 4     Intubation Date: N/A     Ventilator Day #: N/A     Central Line Insertion Date: N/A                                                    Day #: N/A      Arterial Line Insertion Date: N/A                             Day #: N/A     DVT Prophylaxis: on Eliquis    GI Prophylaxis: Protonix     Beckett Catheter Insertion Date: 12/29/2020                                        Day #: 4                             Continued need (if yes, reason documented and discussed with physician): YES (strict I+O in critical care patient)     Skin Issues/ Wounds and ordered treatment discussed on rounds: no issues     Goals/ Plans for the Day: wean precedex gtt, monitor labs and replace e- as needed,

## 2021-01-01 NOTE — PROGRESS NOTES
This note also relates to the following rows which could not be included:  Resp - Cannot attach notes to unvalidated device data

## 2021-01-01 NOTE — PROGRESS NOTES
Date: 1/1/2021    Time: 1:47 AM    Patient Placed On BIPAP/CPAP/ Non-Invasive Ventilation? Yes    If no must comment. Facial area red/color change? No           If YES are Blister/Lesion present? No   If yes must notify nursing staff  BIPAP/CPAP skin barrier? Yes    Skin barrier type:mepilexlite       Comments:Pt placed on BiPAP for the night. FiO2 weaned to 40%.          Antolin Jain     01/01/21 0058   NIV Type   Mode Bilevel   Mask Type Full face mask   Mask Size Medium   Settings/Measurements   IPAP 18 cmH20   CPAP/EPAP 10 cmH2O   Rate Ordered 18   Resp 29   FiO2  40 %   Vt Exhaled 572 mL   Minute Volume 13.6 Liters   Mask Leak (lpm) 47 lpm   Comfort Level Good   Using Accessory Muscles No   SpO2 97

## 2021-01-01 NOTE — PROGRESS NOTES
Critical Care Team - Daily Progress Note      Date and time: 1/1/2021 7:02 AM  Patient's name:  Jeffrey Frausto [facility]  Medical Record Number: [id number]  Patient's account/billing number: [de-identified]  Patient's YOB: 1945  Age: 66 y.o. Date of Admission: 12/28/2020  8:04 AM  Length of stay during current admission: 4      Primary Care Physician: Shalonda Mcgill MD  ICU Attending Physician: Dr. Josafat Payan Status: Full Code    Reason for ICU admission: Critical care management for acute hypoxic respiratory failure, alcohol withdrawal      SUBJECTIVE:   The patient is a 66 y.o. male with significant past medical history of DVT, CHF with ejection fraction of 40 to 45%, alcohol use. He came from home to the ER on 12/28 due to having several day onset of weakness, he was feeling fatigued, was having issues with cough, shortness of breath, is that he had been drinking the night before. He states he was standing at his counter and his legs gave out because he felt so weak, did not hit his head, when EMS arrived he was still on top of the counter, but his legs were out from under him. It was found in the emergency department that he was Covid positive, he had a an ESTUARDO, BNP was also elevated at 2583, had a mild transaminitis. He was transferred to the floor for further management. Last night while 12/29 rapid response was called as patient was desaturating on his cannula, was placed on nonrebreather, with still had increased work of breathing so he was transitioned to BiPAP. He did not tolerate the BiPAP due to anxiety and had to be transferred down to the the ICU to be placed on a Precedex drip. He was given empiric antibiotics for possible aspiration, and was also given diuretics for possible CHF exacerbation. Appears more comfortable in his room today, satting 100% on his BiPAP, Precedex running at 0.2.     OVERNIGHT EVENTS:       12/31-patient had episode of hypotension last night, but is now normotensive, Precedex being weaned.     : Agitation improved, Tolerated Bipap for 3 hrs        AWAKE & FOLLOWING COMMANDS:  [] No   [x] Yes    CURRENT VENTILATION STATUS:     [] Ventilator  [x] BIPAP  [] Nasal Cannula [] Room Air      IF INTUBATED, ET TUBE MARKING AT LOWER LIP:       cms    SECRETIONS Amount:  [] Small [] Moderate  [] Large  [x] None  Color:     [] White [] Colored  [] Bloody    SEDATION:  RAAS Score:   [x] Precidex gtt  [] Versed gtt  [] Ativan gtt   [] No Sedation    PARALYZED:  [x] No    [] Yes    DIARRHEA:                [x] No                [] Yes  (C. Difficile status: [] positive                                                                                                                       [] negative                                                                                                                     [] pending)    VASOPRESSORS:  [x] No    [] Yes    If yes -   [] Levophed       [] Dopamine     [] Vasopressin       [] Dobutamine  [] Phenylephrine         [] Epinephrine    CENTRAL LINES:     [x] No   [] Yes   (Date of Insertion:   )           If yes -     [] Right IJ     [] Left IJ [] Right Femoral [] Left Femoral                   [] Right Subclavian [] Left Subclavian       PRADHAN'S CATHETER:   [] No   [x] Yes  (Date of Insertion:   )     URINE OUTPUT:            [x] Good   [] Low              [] Anuric      OBJECTIVE:     VITAL SIGNS:  /65   Pulse 73   Temp 98.2 °F (36.8 °C) (Bladder)   Resp 22   Ht 6' (1.829 m)   Wt 193 lb 5.5 oz (87.7 kg)   SpO2 94%   BMI 26.22 kg/m²   Tmax over 24 hours:  Temp (24hrs), Av.4 °F (37.4 °C), Min:97.9 °F (36.6 °C), Max:101.8 °F (38.8 °C)      Patient Vitals for the past 6 hrs:   BP Temp Temp src Pulse Resp SpO2   21 0600 120/65 -- -- 73 22 94 %   21 0500 99/62 -- -- 57 30 98 %   21 0400 102/65 98.2 °F (36.8 °C) Bladder 57 30 97 %   21 0303 -- -- -- -- 25 --   21 0300 108/64 -- -- 56 28 98 %   01/01/21 0200 102/66 -- -- 57 (!) 31 97 %   01/01/21 0139 110/64 -- -- 58 -- --         Intake/Output Summary (Last 24 hours) at 1/1/2021 2354  Last data filed at 1/1/2021 0400  Gross per 24 hour   Intake 645 ml   Output 1425 ml   Net -780 ml     Wt Readings from Last 2 Encounters:   12/31/20 193 lb 5.5 oz (87.7 kg)   12/23/19 195 lb 3.2 oz (88.5 kg)     Body mass index is 26.22 kg/m².         PHYSICAL EXAMINATION:    General appearance - alert, ill-appearing, responds to commands  Mental status - alert, oriented to person, place, time  Eyes - pupils equal and reactive, extraocular eye movements intact, sclera anicteric  Ears - external ear normal  Nose - normal and patent, no erythema, discharge or polyps  Mouth - mucous membranes moist, pharynx normal without lesions  Neck - supple, no significant adenopathy  Chest -diminished throughout, wheezing present  Heart - normal rate, regular rhythm, normal S1, S2, no murmurs, rubs, clicks or gallops  Abdomen - soft, nontender, nondistended, no masses or organomegaly  Neurological - alert, oriented person, responds to commands, moves extremities  Extremities -1+edema of the lower extremities  Skin -no visible lesions     Any additional physical findings:    MEDICATIONS:    Scheduled Meds:   thiamine  100 mg Intravenous Daily    lansoprazole  30 mg Per NG tube QAM AC    metoprolol tartrate  25 mg Oral BID    clotrimazole   Topical BID    lidocaine PF  5 mL Intradermal Once    heparin flush  3 mL Intravenous 2 times per day    folic acid  1 mg Oral Daily    clopidogrel  75 mg Oral Daily    bumetanide  1 mg Intravenous Daily    remdesivir IVPB  100 mg Intravenous Q24H    piperacillin-tazobactam  3.375 g Intravenous Q8H    allopurinol  300 mg Oral Daily    apixaban  5 mg Oral BID    lipase-protease-amylase  36,000 Units Oral TID WC    sodium chloride flush  10 mL Intravenous 2 times per day    dexamethasone  6 mg Oral Daily     Continuous Infusions:   sodium chloride 12.5 mL/hr at 12/31/20 1611    dexmedetomidine (PRECEDEX) IV infusion 0.4 mcg/kg/hr (12/31/20 2209)     PRN Meds:       sodium chloride flush, 10 mL, PRN      heparin flush, 3 mL, PRN      potassium chloride, 10 mEq, PRN      LORazepam, 1 mg, Q1H PRN    Or      LORazepam, 1 mg, Q1H PRN    Or      LORazepam, 2 mg, Q1H PRN    Or      LORazepam, 2 mg, Q1H PRN    Or      LORazepam, 3 mg, Q1H PRN    Or      LORazepam, 3 mg, Q1H PRN    Or      LORazepam, 4 mg, Q1H PRN    Or      LORazepam, 4 mg, Q1H PRN      sodium chloride, 30 mL, PRN      HYDROcodone-acetaminophen, 1 tablet, Q6H PRN      loperamide, 2 mg, 4x Daily PRN      sodium chloride flush, 10 mL, PRN      promethazine, 12.5 mg, Q6H PRN    Or      ondansetron, 4 mg, Q6H PRN      polyethylene glycol, 17 g, Daily PRN      acetaminophen, 650 mg, Q6H PRN    Or      acetaminophen, 650 mg, Q6H PRN          VENT SETTINGS (Comprehensive) (if applicable):  Vent Information  Skin Assessment: Redness (see comment/note)  FiO2 : 40 %  SpO2: 94 %  SpO2/FiO2 ratio: 166.67  Mask Type: Full face mask  Mask Size: Medium  Additional Respiratory  Assessments  Pulse: 73  Resp: 22  SpO2: 94 %  Oral Care: Mouth swabbed    ABGs:   Arterial Blood Gas result:  pO2 110.8; pCO2 33.9; pH 7.452;  HCO3 23.1, %O2 Sat 97.6. 1/1: Arterial Blood Gas result:  pO2 66.9; pCO2 35.4; pH 7.446;  HCO3 23.8, %O2 Sat 92.5.         Laboratory findings:    Complete Blood Count:   Recent Labs     12/29/20  2351 12/31/20  0525 01/01/21  0550   WBC 6.8 4.6 4.9   HGB 12.3* 10.8* 10.7*   HCT 36.9* 33.6* 33.8*    209 237        Last 3 Blood Glucose:   Recent Labs     12/30/20  0145 12/31/20  0525 01/01/21  0550   GLUCOSE 138* 135* 143*        PT/INR:    Lab Results   Component Value Date    PROTIME 12.0 12/28/2020    INR 1.0 12/28/2020     PTT:    Lab Results   Component Value Date    APTT 24.8 12/28/2020       Comprehensive Metabolic Profile:   Recent Labs 12/30/20  0145 12/31/20  0525 01/01/21  0550    139 143   K 3.4* 3.7 4.3    103 96*   CO2 23 27 28   BUN 17 21 30*   CREATININE 1.2 1.3* 1.3*   GLUCOSE 138* 135* 143*   CALCIUM 8.5* 8.0* 8.3*   PROT 7.1 6.3* 6.5   LABALBU 3.1* 2.9* 2.9*   BILITOT 0.3 0.3 0.3   ALKPHOS 48 49 50   * 81* 62*   ALT 52* 38 31      Magnesium:   Lab Results   Component Value Date    MG 2.3 01/01/2021     Phosphorus:   Lab Results   Component Value Date    PHOS 4.3 01/01/2021     Ionized Calcium: No results found for: CAION     Urinalysis:     Troponin:   Recent Labs     12/30/20  0145 12/30/20  0840 12/30/20  1532   TROPONINI 0.15* 0.13* 0.12*       Microbiology:    Cultures during this admission:     Blood cultures:                 [] None drawn      [x] Negative             []  Positive (Details:  )  Urine Culture:                   [x] None drawn      [x] Negative             []  Positive (Details:  )  Sputum Culture:               [x] None drawn       [] Negative             []  Positive (Details:  )   Endotracheal aspirate:     [x] None drawn       [] Negative             []  Positive (Details:  )     Other pertinent Labs:       Radiology/Imaging:   Xr Ribs Left Include Chest (min 3 Views)    Result Date: 12/28/2020  EXAMINATION: 5 XRAY VIEWS OF THE LEFT RIBS WITH FRONTAL XRAY VIEW OF THE CHEST 12/28/2020 9:17 am COMPARISON: None. HISTORY: ORDERING SYSTEM PROVIDED HISTORY: Rib pain after fall TECHNOLOGIST PROVIDED HISTORY: Reason for exam:->Rib pain after fall FINDINGS: There is no right or left rib fracture. Multiple views the left wrist were obtained. There is no left pneumothorax or lung contusion. Multifocal bilateral pulmonary infiltrates are noted more prominent within the right upper lobe consistent with pneumonia. The heart is enlarged. 1. There is no appreciable left rib fracture or pneumothorax 2. Multifocal pneumonia more prominent within the right upper lobe.     Xr Chest Portable    Result Date: 12/30/2020  EXAMINATION: ONE XRAY VIEW OF THE CHEST 12/29/2020 10:24 pm COMPARISON: 12/20/2020 HISTORY: ORDERING SYSTEM PROVIDED HISTORY: hjypoxia TECHNOLOGIST PROVIDED HISTORY: Reason for exam:->hjypoxia FINDINGS: Increasing right lung airspace opacities compared to prior study. The left lung is clear. No sizable effusion or pneumothorax. Stable cardiomegaly. No gross osseous abnormalities. Increasing right lung airspace opacities compared to prior study. Xr Hip 2-3 Vw W Pelvis Left    Result Date: 12/28/2020  EXAMINATION: ONE XRAY VIEW OF THE PELVIS AND TWO XRAY VIEWS LEFT HIP for three views total 12/28/2020 9:17 am COMPARISON: None. HISTORY: ORDERING SYSTEM PROVIDED HISTORY: fall - pain TECHNOLOGIST PROVIDED HISTORY: Reason for exam:->fall - pain FINDINGS: No fracture or dislocation. The sacroiliac and hip joints appear preserved for age. Pelvic phleboliths. Lumbar spine levoscoliosis and degenerative spondylosis. Atherosclerotic calcifications. 1.  Negative for fracture or acute osseous abnormality. 2.  Chronic findings, as above       Chest Xray (1/1/2021):    ASSESSMENT:     Patient Active Problem List    Diagnosis Date Noted    COVID-19 12/28/2020    Hypokalemia 12/28/2020    Hypomagnesemia 12/28/2020    Fever and chills 12/28/2020    Hypoxia 12/28/2020    Acute hypoxemic respiratory failure due to severe acute respiratory syndrome coronavirus 2 (SARS-CoV-2) disease ([facility] [address] 12/28/2020    Chest pain 12/22/2019    DVT (deep venous thrombosis) ([facility] [address] 12/20/2019    CHF with unknown LVEF ([facility] [address] 12/20/2019       Additional assessment:            PLAN:     WEAN PER PROTOCOL:  [] No   [x] Yes  [] N/A    DISCONTINUE ANY LABS:   [x] No   [] Yes    ICU PROPHYLAXIS:  Stress ulcer:  [x] PPI Agent  [] I5Hipwn [] Sucralfate  [] Other:  VTE:   [x] Eliquis  [] Unfract.  Heparin Subcut  [] EPC Cuffs    NUTRITION:  [x] NPO [] Tube Feeding (Specify: ) [] TPN  [] PO (Diet: Diet NPO Effective Now)    HOME MEDICATIONS RECONCILED: [x] No  [] Yes    INSULIN DRIP:   [x] No   [] Yes    CONSULTATION NEEDED:  [] No   [x] Yes    FAMILY UPDATED:    [] No   [x] Yes    TRANSFER OUT OF ICU:   [x] No   [] Yes    ADDITIONAL PLAN:    Neuro   Agitation/ DT's-likely related to hypoxia, patient is calmer now that his sats have improved, and he is on Precedex, wean sedation as tolerated  Continue to Wean sedation, Provide Ativan for agitation. ciwa protocol      Respiratory   Acute hypoxic respiratory failure-likely related to COVID-19 pneumonia, will continue to monitor, wean BiPAP as tolerated     Cardiovascular   Monitor for any signs of bradycardia as patient is on Precedex  History of CHF-monitor I's and O's, chest x-rays to evaluate for fluid overload  Troponin elevation- downtrending, likely demand ischemia, no active chest pain     Gastrointestinal   Protonix 40 mg     Renal   ESTUARDO-creatinine 1.2 12/30, was 1.0, may be related to Lasix, or cardiorenal syndrome     Infectious Disease   COVID-19 pneumonia-Decadron, remdesivir, empiric antibiotics for aspiration, may require tocilizumab     Hematology/Oncology   Eliquis for DVT     Endocrine   We will have to monitor pedal status patient tolerating off BiPAP     Social/Spiritual/DNR/Other   Full code     ASSESSMENT/ PLAN   1. Continue home bumex IV  2. Phenobarb load 12/30, CIWA protocol. PRN ativan for agitation. 3. D/c precidex  4. Continue home medications  5. Vanc and zosyn for empiric coverage of PNA  6. Remdesivir, decadron for COVID  7. Change to toprol tartrate for NG  8. Central line placement for access   9. Continue ICU level of care    Electronically signed by Summer Rollins DO on 1/1/2021 at 3:31 PM    I personally saw, examined and provided care for the patient. Radiographs, labs and medication list were reviewed by me independently. I spoke with bedside nursing, therapists and consultants.  Critical care services and times documented are independent of

## 2021-01-01 NOTE — CONSULTS
Consulted to place PICC on this patient. I spoke to ICU nurse around 3pm and she states PICC insertion is not urgent. At 1730, I called ICU and notified staff member that I will not be able to do this PICC tonight. If it is needed tonight to call in   On call service. If not, we will assess this patient in AM for PICC insertion.   Danyel Dale RN, CPUI, [facility]

## 2021-01-02 LAB
ALBUMIN SERPL-MCNC: 2.7 G/DL (ref 3.5–5.2)
ALP BLD-CCNC: 63 U/L (ref 40–129)
ALT SERPL-CCNC: 34 U/L (ref 0–40)
ANION GAP SERPL CALCULATED.3IONS-SCNC: 8 MMOL/L (ref 7–16)
AST SERPL-CCNC: 67 U/L (ref 0–39)
B.E.: -0.8 MMOL/L (ref -3–3)
BASOPHILS ABSOLUTE: 0 E9/L (ref 0–0.2)
BASOPHILS RELATIVE PERCENT: 0 % (ref 0–2)
BILIRUB SERPL-MCNC: 0.3 MG/DL (ref 0–1.2)
BLOOD CULTURE, ROUTINE: NORMAL
BUN BLDV-MCNC: 36 MG/DL (ref 8–23)
CALCIUM SERPL-MCNC: 8.5 MG/DL (ref 8.6–10.2)
CHLORIDE BLD-SCNC: 104 MMOL/L (ref 98–107)
CO2: 29 MMOL/L (ref 22–29)
COHB: 0.7 % (ref 0–1.5)
CREAT SERPL-MCNC: 1.3 MG/DL (ref 0.7–1.2)
CRITICAL: ABNORMAL
CULTURE, BLOOD 2: NORMAL
DATE ANALYZED: ABNORMAL
DATE OF COLLECTION: ABNORMAL
EOSINOPHILS ABSOLUTE: 0.06 E9/L (ref 0.05–0.5)
EOSINOPHILS RELATIVE PERCENT: 1 % (ref 0–6)
GFR AFRICAN AMERICAN: >60
GFR NON-AFRICAN AMERICAN: 53 ML/MIN/1.73
GLUCOSE BLD-MCNC: 132 MG/DL (ref 74–99)
HCO3: 22.8 MMOL/L (ref 22–26)
HCT VFR BLD CALC: 33.4 % (ref 37–54)
HEMOGLOBIN: 10.4 G/DL (ref 12.5–16.5)
HHB: 8.2 % (ref 0–5)
LAB: ABNORMAL
LYMPHOCYTES ABSOLUTE: 0.56 E9/L (ref 1.5–4)
LYMPHOCYTES RELATIVE PERCENT: 10 % (ref 20–42)
Lab: ABNORMAL
MAGNESIUM: 2.1 MG/DL (ref 1.6–2.6)
MCH RBC QN AUTO: 33.4 PG (ref 26–35)
MCHC RBC AUTO-ENTMCNC: 31.1 % (ref 32–34.5)
MCV RBC AUTO: 107.4 FL (ref 80–99.9)
METHB: 0.2 % (ref 0–1.5)
MODE: ABNORMAL
MONOCYTES ABSOLUTE: 0.28 E9/L (ref 0.1–0.95)
MONOCYTES RELATIVE PERCENT: 5 % (ref 2–12)
NEUTROPHILS ABSOLUTE: 4.7 E9/L (ref 1.8–7.3)
NEUTROPHILS RELATIVE PERCENT: 84 % (ref 43–80)
O2 CONTENT: 16.6 ML/DL
O2 SATURATION: 91.7 % (ref 92–98.5)
O2HB: 90.9 % (ref 94–97)
OPERATOR ID: 2325
PATIENT TEMP: 37 C
PCO2: 34.3 MMHG (ref 35–45)
PDW BLD-RTO: 12.7 FL (ref 11.5–15)
PH BLOOD GAS: 7.44 (ref 7.35–7.45)
PHOSPHORUS: 3.1 MG/DL (ref 2.5–4.5)
PLATELET # BLD: 254 E9/L (ref 130–450)
PMV BLD AUTO: 11.8 FL (ref 7–12)
PO2: 66.5 MMHG (ref 75–100)
POTASSIUM SERPL-SCNC: 3.5 MMOL/L (ref 3.5–5)
RBC # BLD: 3.11 E12/L (ref 3.8–5.8)
RBC # BLD: NORMAL 10*6/UL
SODIUM BLD-SCNC: 141 MMOL/L (ref 132–146)
SOURCE, BLOOD GAS: ABNORMAL
THB: 13 G/DL (ref 11.5–16.5)
TIME ANALYZED: 650
TOTAL PROTEIN: 6.4 G/DL (ref 6.4–8.3)
WBC # BLD: 5.6 E9/L (ref 4.5–11.5)

## 2021-01-02 PROCEDURE — 36415 COLL VENOUS BLD VENIPUNCTURE: CPT

## 2021-01-02 PROCEDURE — 6360000002 HC RX W HCPCS: Performed by: CLINICAL NURSE SPECIALIST

## 2021-01-02 PROCEDURE — 6370000000 HC RX 637 (ALT 250 FOR IP): Performed by: INTERNAL MEDICINE

## 2021-01-02 PROCEDURE — 2580000003 HC RX 258: Performed by: INTERNAL MEDICINE

## 2021-01-02 PROCEDURE — 84100 ASSAY OF PHOSPHORUS: CPT

## 2021-01-02 PROCEDURE — 94660 CPAP INITIATION&MGMT: CPT

## 2021-01-02 PROCEDURE — 80053 COMPREHEN METABOLIC PANEL: CPT

## 2021-01-02 PROCEDURE — 6360000002 HC RX W HCPCS: Performed by: INTERNAL MEDICINE

## 2021-01-02 PROCEDURE — C1751 CATH, INF, PER/CENT/MIDLINE: HCPCS

## 2021-01-02 PROCEDURE — 2500000003 HC RX 250 WO HCPCS: Performed by: INTERNAL MEDICINE

## 2021-01-02 PROCEDURE — 2700000000 HC OXYGEN THERAPY PER DAY

## 2021-01-02 PROCEDURE — 82805 BLOOD GASES W/O2 SATURATION: CPT

## 2021-01-02 PROCEDURE — 36569 INSJ PICC 5 YR+ W/O IMAGING: CPT

## 2021-01-02 PROCEDURE — 02HV33Z INSERTION OF INFUSION DEVICE INTO SUPERIOR VENA CAVA, PERCUTANEOUS APPROACH: ICD-10-PCS | Performed by: STUDENT IN AN ORGANIZED HEALTH CARE EDUCATION/TRAINING PROGRAM

## 2021-01-02 PROCEDURE — 36600 WITHDRAWAL OF ARTERIAL BLOOD: CPT

## 2021-01-02 PROCEDURE — 6360000002 HC RX W HCPCS

## 2021-01-02 PROCEDURE — 2060000000 HC ICU INTERMEDIATE R&B

## 2021-01-02 PROCEDURE — 85025 COMPLETE CBC W/AUTO DIFF WBC: CPT

## 2021-01-02 PROCEDURE — 2580000003 HC RX 258: Performed by: CLINICAL NURSE SPECIALIST

## 2021-01-02 PROCEDURE — 6370000000 HC RX 637 (ALT 250 FOR IP): Performed by: CLINICAL NURSE SPECIALIST

## 2021-01-02 PROCEDURE — 83735 ASSAY OF MAGNESIUM: CPT

## 2021-01-02 PROCEDURE — 76937 US GUIDE VASCULAR ACCESS: CPT

## 2021-01-02 PROCEDURE — 6360000002 HC RX W HCPCS: Performed by: STUDENT IN AN ORGANIZED HEALTH CARE EDUCATION/TRAINING PROGRAM

## 2021-01-02 PROCEDURE — 99232 SBSQ HOSP IP/OBS MODERATE 35: CPT | Performed by: INTERNAL MEDICINE

## 2021-01-02 RX ORDER — POTASSIUM CHLORIDE 29.8 MG/ML
20 INJECTION INTRAVENOUS PRN
Status: DISCONTINUED | OUTPATIENT
Start: 2021-01-02 | End: 2021-01-07 | Stop reason: HOSPADM

## 2021-01-02 RX ORDER — LANOLIN ALCOHOL/MO/W.PET/CERES
100 CREAM (GRAM) TOPICAL DAILY
Status: DISCONTINUED | OUTPATIENT
Start: 2021-01-03 | End: 2021-01-07 | Stop reason: HOSPADM

## 2021-01-02 RX ORDER — MULTIVITAMIN WITH IRON
1 TABLET ORAL DAILY
Status: DISCONTINUED | OUTPATIENT
Start: 2021-01-03 | End: 2021-01-07 | Stop reason: HOSPADM

## 2021-01-02 RX ORDER — POTASSIUM CHLORIDE 29.8 MG/ML
20 INJECTION INTRAVENOUS
Status: COMPLETED | OUTPATIENT
Start: 2021-01-02 | End: 2021-01-02

## 2021-01-02 RX ORDER — PANTOPRAZOLE SODIUM 40 MG/1
40 TABLET, DELAYED RELEASE ORAL
Status: DISCONTINUED | OUTPATIENT
Start: 2021-01-03 | End: 2021-01-07 | Stop reason: HOSPADM

## 2021-01-02 RX ORDER — HYDRALAZINE HYDROCHLORIDE 20 MG/ML
10 INJECTION INTRAMUSCULAR; INTRAVENOUS EVERY 4 HOURS PRN
Status: DISCONTINUED | OUTPATIENT
Start: 2021-01-02 | End: 2021-01-03

## 2021-01-02 RX ORDER — METOPROLOL TARTRATE 50 MG/1
50 TABLET, FILM COATED ORAL 2 TIMES DAILY
Status: DISCONTINUED | OUTPATIENT
Start: 2021-01-02 | End: 2021-01-03

## 2021-01-02 RX ORDER — HYDRALAZINE HYDROCHLORIDE 20 MG/ML
INJECTION INTRAMUSCULAR; INTRAVENOUS
Status: COMPLETED
Start: 2021-01-02 | End: 2021-01-02

## 2021-01-02 RX ADMIN — CLOTRIMAZOLE: 10 CREAM TOPICAL at 08:19

## 2021-01-02 RX ADMIN — HYDRALAZINE HYDROCHLORIDE 10 MG: 20 INJECTION INTRAMUSCULAR; INTRAVENOUS at 23:27

## 2021-01-02 RX ADMIN — HYDROCODONE BITARTRATE AND ACETAMINOPHEN 1 TABLET: 7.5; 325 TABLET ORAL at 01:11

## 2021-01-02 RX ADMIN — HYDROCODONE BITARTRATE AND ACETAMINOPHEN 1 TABLET: 7.5; 325 TABLET ORAL at 08:30

## 2021-01-02 RX ADMIN — CLOPIDOGREL 75 MG: 75 TABLET, FILM COATED ORAL at 08:17

## 2021-01-02 RX ADMIN — BUMETANIDE 1 MG: 0.25 INJECTION, SOLUTION INTRAMUSCULAR; INTRAVENOUS at 08:17

## 2021-01-02 RX ADMIN — METOPROLOL TARTRATE 25 MG: 25 TABLET, FILM COATED ORAL at 08:17

## 2021-01-02 RX ADMIN — APIXABAN 5 MG: 5 TABLET, FILM COATED ORAL at 20:43

## 2021-01-02 RX ADMIN — ALLOPURINOL 300 MG: 300 TABLET ORAL at 08:17

## 2021-01-02 RX ADMIN — HYDRALAZINE HYDROCHLORIDE 10 MG: 20 INJECTION INTRAMUSCULAR; INTRAVENOUS at 17:36

## 2021-01-02 RX ADMIN — FOLIC ACID 1 MG: 1 TABLET ORAL at 08:17

## 2021-01-02 RX ADMIN — PIPERACILLIN AND TAZOBACTAM 3.38 G: 3; .375 INJECTION, POWDER, LYOPHILIZED, FOR SOLUTION INTRAVENOUS at 11:00

## 2021-01-02 RX ADMIN — LANSOPRAZOLE 30 MG: KIT at 06:13

## 2021-01-02 RX ADMIN — PANCRELIPASE 36000 UNITS: 60000; 12000; 38000 CAPSULE, DELAYED RELEASE PELLETS ORAL at 17:20

## 2021-01-02 RX ADMIN — CLOTRIMAZOLE: 10 CREAM TOPICAL at 20:43

## 2021-01-02 RX ADMIN — POTASSIUM CHLORIDE 20 MEQ: 29.8 INJECTION, SOLUTION INTRAVENOUS at 14:42

## 2021-01-02 RX ADMIN — APIXABAN 5 MG: 5 TABLET, FILM COATED ORAL at 08:16

## 2021-01-02 RX ADMIN — POTASSIUM CHLORIDE 20 MEQ: 29.8 INJECTION, SOLUTION INTRAVENOUS at 13:45

## 2021-01-02 RX ADMIN — REMDESIVIR 100 MG: 100 INJECTION, POWDER, LYOPHILIZED, FOR SOLUTION INTRAVENOUS at 20:44

## 2021-01-02 RX ADMIN — DEXAMETHASONE 6 MG: 4 TABLET ORAL at 08:16

## 2021-01-02 RX ADMIN — Medication 10 ML: at 08:18

## 2021-01-02 RX ADMIN — SODIUM CHLORIDE, PRESERVATIVE FREE 300 UNITS: 5 INJECTION INTRAVENOUS at 20:43

## 2021-01-02 RX ADMIN — Medication 10 ML: at 20:43

## 2021-01-02 RX ADMIN — THIAMINE HYDROCHLORIDE 100 MG: 100 INJECTION, SOLUTION INTRAMUSCULAR; INTRAVENOUS at 08:17

## 2021-01-02 RX ADMIN — LIDOCAINE HYDROCHLORIDE 3 ML: 10 INJECTION, SOLUTION EPIDURAL; INFILTRATION; INTRACAUDAL; PERINEURAL at 09:45

## 2021-01-02 RX ADMIN — METOPROLOL TARTRATE 50 MG: 50 TABLET, FILM COATED ORAL at 20:42

## 2021-01-02 RX ADMIN — PIPERACILLIN AND TAZOBACTAM 3.38 G: 3; .375 INJECTION, POWDER, LYOPHILIZED, FOR SOLUTION INTRAVENOUS at 03:27

## 2021-01-02 RX ADMIN — SODIUM CHLORIDE: 9 INJECTION, SOLUTION INTRAVENOUS at 14:59

## 2021-01-02 RX ADMIN — PIPERACILLIN AND TAZOBACTAM 3.38 G: 3; .375 INJECTION, POWDER, LYOPHILIZED, FOR SOLUTION INTRAVENOUS at 21:00

## 2021-01-02 RX ADMIN — SODIUM CHLORIDE: 9 INJECTION, SOLUTION INTRAVENOUS at 07:30

## 2021-01-02 ASSESSMENT — PAIN DESCRIPTION - DESCRIPTORS
DESCRIPTORS: ACHING;DISCOMFORT
DESCRIPTORS: ACHING;DISCOMFORT

## 2021-01-02 ASSESSMENT — PAIN SCALES - GENERAL
PAINLEVEL_OUTOF10: 0
PAINLEVEL_OUTOF10: 0
PAINLEVEL_OUTOF10: 10
PAINLEVEL_OUTOF10: 0
PAINLEVEL_OUTOF10: 0

## 2021-01-02 ASSESSMENT — PAIN DESCRIPTION - ONSET: ONSET: ON-GOING

## 2021-01-02 ASSESSMENT — PAIN DESCRIPTION - FREQUENCY
FREQUENCY: INTERMITTENT
FREQUENCY: INTERMITTENT

## 2021-01-02 NOTE — PROCEDURES
PICC   Catheter insertion date 1/2/2021     Product Number:  [id number]   Lot No: [id number]   Gauge: 5 fr   Lumen: dual   R Basilic    Vein Diameter : 0.5 cm   Upper arm circumference (10CM ABOVE AC): 33 cm   Catheter Length : 40 cm(10 cm cut from a 50 cm line)   Internal Length: 39 cm   External Catheter Length: 1 cm   Ultrasound Used:yes  VPS [employer] [place] confirms PICC tip is placed in the lower 1/3 of the SVC or at the Cavoatrial junction. Floor nurse notified PICC is okay to use.    : Conrado Acevedo RN

## 2021-01-02 NOTE — PLAN OF CARE
Problem: Falls - Risk of:  Goal: Will remain free from falls  Description: Will remain free from falls  Outcome: Met This Shift  Goal: Absence of physical injury  Description: Absence of physical injury  Outcome: Met This Shift     Problem: Pain:  Goal: Pain level will decrease  Description: Pain level will decrease  Outcome: Met This Shift  Goal: Control of acute pain  Description: Control of acute pain  Outcome: Met This Shift  Goal: Control of chronic pain  Description: Control of chronic pain  Outcome: Met This Shift     Problem: PROTECTIVE PRECAUTIONS  Goal: Isolation precautions  Description: Isolation precautions  Outcome: Met This Shift     Problem: Airway Clearance - Ineffective  Goal: Achieve or maintain patent airway  Outcome: Met This Shift     Problem: Gas Exchange - Impaired  Goal: Absence of hypoxia  Outcome: Met This Shift  Goal: Promote optimal lung function  Outcome: Met This Shift     Problem:  Body Temperature -  Risk of, Imbalanced  Goal: Ability to maintain a body temperature within defined limits  Outcome: Met This Shift  Goal: Will regain or maintain usual level of consciousness  Outcome: Met This Shift  Goal: Complications related to the disease process, condition or treatment will be avoided or minimized  Outcome: Met This Shift     Problem: Isolation Precautions - Risk of Spread of Infection  Goal: Prevent transmission of infection  Outcome: Met This Shift     Problem: Risk for Fluid Volume Deficit  Goal: Maintain absence of muscle cramping  Outcome: Met This Shift  Goal: Maintain normal serum potassium, sodium, calcium, phosphorus, and pH  Outcome: Met This Shift     Problem: Patient Education: Go to Patient Education Activity  Goal: Patient/Family Education  Outcome: Met This Shift     Problem: Skin Integrity:  Goal: Will show no infection signs and symptoms  Description: Will show no infection signs and symptoms  Outcome: Met This Shift  Goal: Absence of new skin breakdown  Description: Absence of new skin breakdown  Outcome: Met This Shift

## 2021-01-02 NOTE — PROGRESS NOTES
PROGRESS NOTE       PATIENT PROBLEM LIST:  Active Problems:    DVT (deep venous thrombosis) ([facility])    CHF with unknown LVEF ([facility])    COVID-19    Hypokalemia    Hypomagnesemia    Fever and chills    Hypoxia    Acute hypoxemic respiratory failure due to severe acute respiratory syndrome coronavirus 2 (SARS-CoV-2) disease ([facility])  Resolved Problems:    * No resolved hospital problems. *      SUBJECTIVE:  Maggie Sosa resting comfortably in bed and in no acute respiratory distress. He denies hemoptysis nor chest discomfort presently. REVIEW OF SYSTEMS:  General ROS: positive for  - fatigue  Psychological ROS:  negative for - anxiety and depression  Ophthalmic ROS:  negative for - decreased vision  or visual distortion. ENT ROS:  negative for - hearing change  Allergy and Immunology ROS: negative  Hematological and Lymphatic ROS: positive for - anemia  Endocrine ROS: negative  Respiratory ROS: positive for - shortness of breath and cough  Cardiovascular ROS: positive for - dyspnea on exertion, irregular heartbeat and shortness of breath  Gastrointestinal ROS: no abdominal pain, change in bowel habits, or black or bloody stools  Genito-Urinary ROS: no dysuria, trouble voiding, or hematuria  Musculoskeletal ROS: negative  Neurological ROS: no TIA or stroke symptoms otherwise no significant change in symptoms or problems since  1/1/2020 as documented in previous progress notes.     SCHEDULED MEDICATIONS:   [START ON 1/3/2021] thiamine  100 mg Oral Daily    [START ON 1/3/2021] multivitamin  1 tablet Oral Daily    [START ON 1/3/2021] pantoprazole  40 mg Oral QAM AC    metoprolol tartrate  25 mg Oral BID    clotrimazole   Topical BID    heparin flush  3 mL Intravenous 2 times per day    folic acid  1 mg Oral Daily    clopidogrel  75 mg Oral Daily    bumetanide  1 mg Intravenous Daily    remdesivir IVPB  100 mg Intravenous Q24H    piperacillin-tazobactam  3.375 g Intravenous Q8H    allopurinol  300 mg Oral Daily    apixaban  5 mg Oral BID    lipase-protease-amylase  36,000 Units Oral TID     sodium chloride flush  10 mL Intravenous 2 times per day    dexamethasone  6 mg Oral Daily       VITAL SIGNS:                                                                                                                          BP (!) 171/93   Pulse 89   Temp 100 °F (37.8 °C) (Bladder)   Resp 23   Ht 6' (1.829 m)   Wt 194 lb 3.6 oz (88.1 kg)   SpO2 93%   BMI 26.34 kg/m²   Patient Vitals for the past 96 hrs (Last 3 readings):   Weight   01/02/21 0101 194 lb 3.6 oz (88.1 kg)   12/31/20 0520 193 lb 5.5 oz (87.7 kg)   12/30/20 0300 199 lb 15.3 oz (90.7 kg)     OBJECTIVE:  General appearance: Well preserved, mesomorphic body habitus, alert, no distress. Skin: Skin color, texture, turgor normal. No rashes or lesions. No induration or tightening palpated. Head: Normocephalic. No masses, lesions, tenderness or abnormalities  Eyes: Conjunctivae/corneas clear. PERRL, EOMs intact. Sclera non icteric. Ears: External ears normal. Canals clear. TM's clear bilaterally. Hearing normal to finger rub. Nose/Sinuses: Nares normal. Septum midline. Mucosa normal. No drainage or sinus tenderness. Oropharynx: Lips, mucosa, and tongue normal. Oropharynx clear with no exudate seen. Neck: Neck supple and symmetric. No adenopathy. Thyroid symmetric, normal size, without nodules. Trachea is midline. Carotids brisk in upstroke without bruits, no abnormal JVP noted at 45°. Chest: Even excursion  Lungs: Lungs grossly clear to auscultation bilaterally. No retractions or use of accessory muscles. No tactile vocal fremitus. No rhonchi, crackles or rales. Heart:  S1 > S2. Regular, irregular rhythm. Ectopics noted. No gallop or murmur. No rub, palpable thrill or heave noted. PMI 5th intercostal space midclavicular line. Abdomen: Abdomen soft, moderately protuberant, non-tender. BS normal. No masses, organomegaly.  No hernia noted.  Extremities: Extremities normal. No deformities, edema, or skin discoloration. No cyanosis or clubbing noted to the nails. Peripheral pulses present 2+ upper extremities and present 1+  lower extremities. Musculoskeletal: Spine ROM normal. Muscular strength intact. Neuro: Cranial nerves intact. Motor: Strength 5/5 in all extremities. Reflexes 2+ in all extremities. No focal weakness. Sensory: grossly normal to touch. Coordination intact. Data:   Scheduled Meds: Reviewed  Continuous Infusions:    sodium chloride 12.5 mL/hr at 01/02/21 1459       Intake/Output Summary (Last 24 hours) at 1/2/2021 1509  Last data filed at 1/2/2021 1439  Gross per 24 hour   Intake 1592 ml   Output 1800 ml   Net -208 ml     CBC:   Recent Labs     12/31/20  0525 01/01/21  0550 01/02/21  0625   WBC 4.6 4.9 5.6   HGB 10.8* 10.7* 10.4*   HCT 33.6* 33.8* 33.4*    237 254     BMP:  Recent Labs     12/31/20  0525 01/01/21  0550 01/02/21  0625    143 141   K 3.7 4.3 3.5    96* 104   CO2 27 28 29   BUN 21 30* 36*   CREATININE 1.3* 1.3* 1.3*   LABGLOM 53 53 53     ABGs:   Lab Results   Component Value Date    PH 7.440 01/02/2021    PO2 66.5 01/02/2021    PCO2 34.3 01/02/2021     INR:   No results for input(s): INR in the last 72 hours. PRO-BNP:   Lab Results   Component Value Date    PROBNP 22,147 (H) 12/30/2020    PROBNP 2,583 (H) 12/28/2020      TSH:   Lab Results   Component Value Date    TSH 3.900 12/21/2019      Cardiac Injury Profile:   Recent Labs     12/30/20  1532   TROPONINI 0.12*      Lipid Profile:   Lab Results   Component Value Date    TRIG 107 12/21/2019    HDL 74 12/21/2019    LDLCALC 75 12/21/2019    CHOL 170 12/21/2019      Hemoglobin A1C: No components found for: HGBA1C     RAD:   Xr Ribs Left Include Chest (min 3 Views)    Result Date: 12/28/2020  EXAMINATION: 5 XRAY VIEWS OF THE LEFT RIBS WITH FRONTAL XRAY VIEW OF THE CHEST 12/28/2020 9:17 am COMPARISON: None.  HISTORY: ORDERING SYSTEM PROVIDED HISTORY: Rib pain after fall TECHNOLOGIST PROVIDED HISTORY: Reason for exam:->Rib pain after fall FINDINGS: There is no right or left rib fracture. Multiple views the left wrist were obtained. There is no left pneumothorax or lung contusion. Multifocal bilateral pulmonary infiltrates are noted more prominent within the right upper lobe consistent with pneumonia. The heart is enlarged. 1. There is no appreciable left rib fracture or pneumothorax 2. Multifocal pneumonia more prominent within the right upper lobe. Xr Chest Portable    Result Date: 2020  EXAMINATION: ONE XRAY VIEW OF THE CHEST 2020 10:24 pm COMPARISON: 2020 HISTORY: ORDERING SYSTEM PROVIDED HISTORY: hjypoxia TECHNOLOGIST PROVIDED HISTORY: Reason for exam:->hjypoxia FINDINGS: Increasing right lung airspace opacities compared to prior study. The left lung is clear. No sizable effusion or pneumothorax. Stable cardiomegaly. No gross osseous abnormalities. Increasing right lung airspace opacities compared to prior study. Xr Hip 2-3 Vw W Pelvis Left    Result Date: 2020  EXAMINATION: ONE XRAY VIEW OF THE PELVIS AND TWO XRAY VIEWS LEFT HIP for three views total 2020 9:17 am COMPARISON: None. HISTORY: ORDERING SYSTEM PROVIDED HISTORY: fall - pain TECHNOLOGIST PROVIDED HISTORY: Reason for exam:->fall - pain FINDINGS: No fracture or dislocation. The sacroiliac and hip joints appear preserved for age. Pelvic phleboliths. Lumbar spine levoscoliosis and degenerative spondylosis. Atherosclerotic calcifications. 1.  Negative for fracture or acute osseous abnormality.  2.  Chronic findings, as above    NM Cardiac Stress Test Nuclear Imaging    Patient MRN:  [id number]   : 1945   Age: 66 years   Gender: Male   Order Date:  2019 4:45 PM   EXAM: NM MYOCARDIAL SPECT REST EXERCISE OR RX   Number of Images: 9 views   INDICATION:  Chest pain    Reason for Exam?->Chest pain   Procedure Type->Rx COMPARISON: None       TECHNIQUE:   10 mCi of Tc-99m MIBI was injected intravenously at rest and cardiac   SPECT images were performed. In addition 31 mCi of Tc-99m MIBI was   injected intravenously at maximum stress by using Lexiscan stress. Stress SPECT images and gated study were performed.        FINDINGS:   Perfusion images demonstrate no reversible perfusion defect. Wall motion is hypokinetic   The end diastolic volume is 860 ml. The end systolic volume is 77 ml. The estimated ejection fraction is 28 %. Impression   1. No reversible perfusion defect   2. Ejection fraction is 28 %. 3. Wall motion appears to be hypokinetic       EKG: See Report  Echo: 12/21/2019  Summary   Ejection fraction is visually estimated at 40-45%. LV diastolic dysfunction   Trileaflet aortic valve. Normal leaflet mobility. No aortic stenosis. No aortic regurgitation. The left atrium is mildly dilated. Mildly dilated aortic root. Physiologic and/or trace mitral regurgitation is present. No evidence of hemodynamically significant mitral stenosis. No evidence for hemodynamically significant pericardial effusion. Normal right ventricle structure and function. Normal tricuspid valve structure and function. IMPRESSIONS:  Active Problems:    DVT (deep venous thrombosis) ([facility])    CHF with unknown LVEF ([facility])    COVID-19    Hypokalemia    Hypomagnesemia    Fever and chills    Hypoxia    Acute hypoxemic respiratory failure due to severe acute respiratory syndrome coronavirus 2 (SARS-CoV-2) disease ([facility])  Resolved Problems:    * No resolved hospital problems. *      RECOMMENDATIONS:  We will add low-dose neprilysin inhibitor and carefully monitor renal function and other serum electrolytes. Is to be transferred to a stepdown unit as he appears stable presently. Continue to carefully monitor renal function and blood pressure.     I have spent more than 30 critical care minutes face to face with Rhett Walls Sheila and reviewing notes and laboratory data, with greater than 50% of this time instructing and counseling the patient's ICU team face to face regarding my findings and recommendations and I have answered all questions as posed to me by Mr. Davonte Jo ICU team.    Diamond Norris, DO FACP,FACC,FSCAI      NOTE:  This report was transcribed using voice recognition software.   Every effort was made to ensure accuracy; however, inadvertent computerized transcription errors may be present

## 2021-01-02 NOTE — PROGRESS NOTES
now normotensive, Precedex being weaned. : Agitation improved, Tolerated Bipap for 3 hrs  : No acute events.          AWAKE & FOLLOWING COMMANDS:  [] No   [x] Yes    CURRENT VENTILATION STATUS:     [] Ventilator  [] BIPAP  [x] Nasal Cannula [] Room Air      IF INTUBATED, ET TUBE MARKING AT LOWER LIP:       cms    SECRETIONS Amount:  [] Small [] Moderate  [] Large  [x] None  Color:     [] White [] Colored  [] Bloody    SEDATION:  RAAS Score:   [x] Precidex gtt  [] Versed gtt  [] Ativan gtt   [] No Sedation    PARALYZED:  [x] No    [] Yes    DIARRHEA:                [x] No                [] Yes  (C. Difficile status: [] positive                                                                                                                       [] negative                                                                                                                     [] pending)    VASOPRESSORS:  [x] No    [] Yes    If yes -   [] Levophed       [] Dopamine     [] Vasopressin       [] Dobutamine  [] Phenylephrine         [] Epinephrine    CENTRAL LINES:     [] No   [x] Yes   (Date of Insertion:  R basilic PICC )           If yes -     [] Right IJ     [] Left IJ [] Right Femoral [] Left Femoral                   [] Right Subclavian [] Left Subclavian       PRADHAN'S CATHETER:   [] No   [x] Yes  (Date of Insertion:   )     URINE OUTPUT:            [x] Good   [] Low              [] Anuric      OBJECTIVE:     VITAL SIGNS:  BP (!) 150/76   Pulse 87   Temp 98.6 °F (37 °C) (Bladder)   Resp 23   Ht 6' (1.829 m)   Wt 194 lb 3.6 oz (88.1 kg)   SpO2 92%   BMI 26.34 kg/m²   Tmax over 24 hours:  Temp (24hrs), Av.7 °F (37.1 °C), Min:98.6 °F (37 °C), Max:98.9 °F (37.2 °C)      Patient Vitals for the past 6 hrs:   BP Temp Temp src Pulse Resp SpO2   21 0600 (!) 150/76 -- -- 87 23 92 %   21 0500 (!) 153/76 -- -- 81 25 96 %   21 0400 130/70 98.6 °F (37 °C) Bladder 60 26 98 %   21 0300 137/80 2319    dexmedetomidine (PRECEDEX) IV infusion Stopped (01/02/21 0440)     PRN Meds:       sodium chloride flush, 10 mL, PRN      heparin flush, 3 mL, PRN      potassium chloride, 10 mEq, PRN      LORazepam, 1 mg, Q1H PRN    Or      LORazepam, 1 mg, Q1H PRN    Or      LORazepam, 2 mg, Q1H PRN    Or      LORazepam, 2 mg, Q1H PRN    Or      LORazepam, 3 mg, Q1H PRN    Or      LORazepam, 3 mg, Q1H PRN    Or      LORazepam, 4 mg, Q1H PRN    Or      LORazepam, 4 mg, Q1H PRN      sodium chloride, 30 mL, PRN      HYDROcodone-acetaminophen, 1 tablet, Q6H PRN      loperamide, 2 mg, 4x Daily PRN      sodium chloride flush, 10 mL, PRN      promethazine, 12.5 mg, Q6H PRN    Or      ondansetron, 4 mg, Q6H PRN      polyethylene glycol, 17 g, Daily PRN      acetaminophen, 650 mg, Q6H PRN    Or      acetaminophen, 650 mg, Q6H PRN          VENT SETTINGS (Comprehensive) (if applicable):  Vent Information  Skin Assessment: Clean, dry, & intact  FiO2 : 40 %  SpO2: 92 %  SpO2/FiO2 ratio: 166.67  Mask Type: Full face mask  Mask Size: Medium  Additional Respiratory  Assessments  Pulse: 87  Resp: 23  SpO2: 92 %  Oral Care: Mouth swabbed    ABGs:   Arterial Blood Gas result:  pO2 110.8; pCO2 33.9; pH 7.452;  HCO3 23.1, %O2 Sat 97.6. 1/1: Arterial Blood Gas result:  pO2 66.9; pCO2 35.4; pH 7.446;  HCO3 23.8, %O2 Sat 92.5.         Laboratory findings:    Complete Blood Count:   Recent Labs     12/31/20  0525 01/01/21  0550 01/02/21  0625   WBC 4.6 4.9 5.6   HGB 10.8* 10.7* 10.4*   HCT 33.6* 33.8* 33.4*    237 254        Last 3 Blood Glucose:   Recent Labs     12/31/20  0525 01/01/21  0550   GLUCOSE 135* 143*        PT/INR:    Lab Results   Component Value Date    PROTIME 12.0 12/28/2020    INR 1.0 12/28/2020     PTT:    Lab Results   Component Value Date    APTT 24.8 12/28/2020       Comprehensive Metabolic Profile:   Recent Labs     12/31/20  0525 01/01/21  0550    143   K 3.7 4.3    96*   CO2 27 28 BUN 21 30*   CREATININE 1.3* 1.3*   GLUCOSE 135* 143*   CALCIUM 8.0* 8.3*   PROT 6.3* 6.5   LABALBU 2.9* 2.9*   BILITOT 0.3 0.3   ALKPHOS 49 50   AST 81* 62*   ALT 38 31      Magnesium:   Lab Results   Component Value Date    MG 2.3 01/01/2021     Phosphorus:   Lab Results   Component Value Date    PHOS 4.3 01/01/2021     Ionized Calcium: No results found for: CAION     Urinalysis:     Troponin:   Recent Labs     12/30/20  0840 12/30/20  1532   TROPONINI 0.13* 0.12*       Microbiology:    Cultures during this admission:     Blood cultures:                 [] None drawn      [x] Negative             []  Positive (Details:  )  Urine Culture:                   [x] None drawn      [x] Negative             []  Positive (Details:  )  Sputum Culture:               [x] None drawn       [] Negative             []  Positive (Details:  )   Endotracheal aspirate:     [x] None drawn       [] Negative             []  Positive (Details:  )     Other pertinent Labs:       Radiology/Imaging:   Xr Ribs Left Include Chest (min 3 Views)    Result Date: 12/28/2020  EXAMINATION: 5 XRAY VIEWS OF THE LEFT RIBS WITH FRONTAL XRAY VIEW OF THE CHEST 12/28/2020 9:17 am COMPARISON: None. HISTORY: ORDERING SYSTEM PROVIDED HISTORY: Rib pain after fall TECHNOLOGIST PROVIDED HISTORY: Reason for exam:->Rib pain after fall FINDINGS: There is no right or left rib fracture. Multiple views the left wrist were obtained. There is no left pneumothorax or lung contusion. Multifocal bilateral pulmonary infiltrates are noted more prominent within the right upper lobe consistent with pneumonia. The heart is enlarged. 1. There is no appreciable left rib fracture or pneumothorax 2. Multifocal pneumonia more prominent within the right upper lobe.     Xr Chest Portable    Result Date: 12/30/2020  EXAMINATION: ONE XRAY VIEW OF THE CHEST 12/29/2020 10:24 pm COMPARISON: 12/20/2020 HISTORY: ORDERING SYSTEM PROVIDED HISTORY: hjypoxia TECHNOLOGIST PROVIDED HISTORY: Reason for exam:->hjypoxia FINDINGS: Increasing right lung airspace opacities compared to prior study. The left lung is clear. No sizable effusion or pneumothorax. Stable cardiomegaly. No gross osseous abnormalities. Increasing right lung airspace opacities compared to prior study. Xr Hip 2-3 Vw W Pelvis Left    Result Date: 12/28/2020  EXAMINATION: ONE XRAY VIEW OF THE PELVIS AND TWO XRAY VIEWS LEFT HIP for three views total 12/28/2020 9:17 am COMPARISON: None. HISTORY: ORDERING SYSTEM PROVIDED HISTORY: fall - pain TECHNOLOGIST PROVIDED HISTORY: Reason for exam:->fall - pain FINDINGS: No fracture or dislocation. The sacroiliac and hip joints appear preserved for age. Pelvic phleboliths. Lumbar spine levoscoliosis and degenerative spondylosis. Atherosclerotic calcifications. 1.  Negative for fracture or acute osseous abnormality. 2.  Chronic findings, as above       Chest Xray (1/2/2021):    ASSESSMENT:     Patient Active Problem List    Diagnosis Date Noted    COVID-19 12/28/2020    Hypokalemia 12/28/2020    Hypomagnesemia 12/28/2020    Fever and chills 12/28/2020    Hypoxia 12/28/2020    Acute hypoxemic respiratory failure due to severe acute respiratory syndrome coronavirus 2 (SARS-CoV-2) disease ([facility] [address] 12/28/2020    Chest pain 12/22/2019    DVT (deep venous thrombosis) ([facility] [address] 12/20/2019    CHF with unknown LVEF ([facility] [address] 12/20/2019       Additional assessment:            PLAN:     WEAN PER PROTOCOL:  [] No   [] Yes  [] N/A    DISCONTINUE ANY LABS:   [x] No   [] Yes    ICU PROPHYLAXIS:  Stress ulcer:  [x] PPI Agent  [] [id number] [] Sucralfate  [] Other:  VTE:   [x] Eliquis  [] Unfract. Heparin Subcut  [] EPC Cuffs    NUTRITION:  [] NPO [x] Tube Feeding (Specify: ) [] TPN  [] PO (Diet: DIET TUBE FEED CONTINUOUS/CYCLIC NPO;  Immune Enhancing; Nasogastric; 20; 55; 24)    HOME MEDICATIONS RECONCILED: [] No  [x] Yes    INSULIN DRIP:   [x] No   [] Yes    CONSULTATION NEEDED:  [] No   [x] Yes    FAMILY UPDATED:    [] No   [x] Yes    TRANSFER OUT OF ICU:   [] No   [x] Yes    ADDITIONAL PLAN:    Neuro   Agitation/ DT's-likely related to hypoxia, patient is calmer now that his sats have improved, and he is on Precedex, wean sedation as tolerated  Continue to Wean sedation, Provide Ativan for agitation. ciwa protocol      Respiratory   Acute hypoxic respiratory failure-likely related to COVID-19 pneumonia, on NC, will continue to monitor     Cardiovascular   Monitor for any signs of bradycardia as patient is on Precedex  History of CHF-monitor I's and O's, chest x-rays to evaluate for fluid overload  Troponin elevation- downtrending, likely demand ischemia, no active chest pain     Gastrointestinal   Protonix 40 mg     Renal   ESTUARDO-creatinine 1.2 12/30, was 1.0, may be related to Lasix, or cardiorenal syndrome     Infectious Disease   COVID-19 pneumonia-Decadron, remdesivir, empiric antibiotics for aspiration, may require tocilizumab     Hematology/Oncology   Eliquis for DVT     Endocrine   We will have to monitor pedal status patient tolerating off BiPAP     Social/Spiritual/DNR/Other   Full code     ASSESSMENT/ PLAN   1. Continue home bumex IV  2. Phenobarb load 12/30, CIWA protocol. PRN ativan for agitation. 3. D/c precedex  4. Continue home medications  5. Vanc and zosyn for empiric coverage of PNA  6. Remdesivir, decadron for COVID  7. Toprol tartrate for NG  8. PICC placed 1/2  9. Transfer to immediate floor    Electronically signed by Malgorzata Cavazos MD on 1/2/2021 at 7:23 AM    I personally saw, examined and provided care for the patient. Radiographs, labs and medication list were reviewed by me independently. I spoke with bedside nursing, therapists and consultants. Critical care services and times documented are independent of procedures and multidisciplinary rounds with Residents.  Additionally comprehensive, multidisciplinary rounds were conducted with the MICU team. The case was discussed in detail and plans for care were established. Review of Residents documentation was conducted and revisions were made as appropriate. I agree with the above documented exam, problem list and plan of care. Mentation improved   chf per cardiology   Abx for pna   Swallow study   Malachi Middleton M.D.    Pulmonary/Critical Care Medicine   33 min cct excluding procedures

## 2021-01-02 NOTE — PATIENT CARE CONFERENCE
Intensive Care Daily Quality Rounding Checklist      ICU Team Members: Dr. Mirian Sal, Dr. Ernestina Gibson (resident), charge nurse, bedside nurse, respiratory therapist    ICU Day #: NUMBER: 5    Intubation Date: n/a    Ventilator Day #: n/a    Central Line Insertion Date: n/a        Day #: n/a     Arterial Line Insertion Date: n/a      Day #: n/a    Temporary Hemodialysis Catheter Insertion Date:       Day #     DVT Prophylaxis: eliquis     GI Prophylaxis: protonix     Beckett Catheter Insertion Date: December 29       Day #: 5      Continued need (if yes, reason documented and discussed with physician): yes, strict I/O     Skin Issues/ Wounds and ordered treatment discussed on rounds: SOS precautions     Goals/ Plans for the Day: Daily labs, wean oxygen as tolerated, PICC line, transfer to floor today

## 2021-01-02 NOTE — PROGRESS NOTES
[facility] Hospitalist   Progress Note    Admitting Date and Time: 12/28/2020  8:04 AM  Admit Dx: Acute hypoxemic respiratory failure due to severe acute respiratory syndrome coronavirus 2 (SARS-CoV-2) disease ([facility]) [U07.1, J96.01]    Subjective: Admitted on 28th, patient with known systolic heart failure, chronic leg DVT, alcohol use, presented with weakness, fatigue, cough, shortness of breath, poor appetite, change in taste. COVID-19 positive. Also electrolyte imbalance. Seen by cardiology, remains on his cardiac medications. Patient with respiratory distress and hypoxemia on 29th evening. Needed to go to ICU. Patient was admitted with Acute hypoxemic respiratory failure due to severe acute respiratory syndrome coronavirus 2 (SARS-CoV-2) disease ([facility]) [U07.1, J96.01]Patient noted resting, comfortable, sitting in ICU, on nasal cannula. Per RN: No new complaints. Patient is off Precedex drip as of 3 AM today. NG tube had, and swelling was checked which she did fine. Has been started on p.o. and patient is already released for transfer upstairs. ROS: denies fever, chills, cp, sob, n/v, HA unless stated above.      thiamine  100 mg Intravenous Daily    lansoprazole  30 mg Per NG tube QAM AC    metoprolol tartrate  25 mg Oral BID    clotrimazole   Topical BID    heparin flush  3 mL Intravenous 2 times per day    folic acid  1 mg Oral Daily    clopidogrel  75 mg Oral Daily    bumetanide  1 mg Intravenous Daily    remdesivir IVPB  100 mg Intravenous Q24H    piperacillin-tazobactam  3.375 g Intravenous Q8H    allopurinol  300 mg Oral Daily    apixaban  5 mg Oral BID    lipase-protease-amylase  36,000 Units Oral TID WC    sodium chloride flush  10 mL Intravenous 2 times per day    dexamethasone  6 mg Oral Daily         sodium chloride flush, 10 mL, PRN      heparin flush, 3 mL, PRN      potassium chloride, 10 mEq, PRN      LORazepam, 1 mg, Q1H PRN    Or      LORazepam, 1 mg, Q1H PRN    Or      LORazepam, 2 mg, Q1H PRN    Or      LORazepam, 2 mg, Q1H PRN    Or      LORazepam, 3 mg, Q1H PRN    Or      LORazepam, 3 mg, Q1H PRN    Or      LORazepam, 4 mg, Q1H PRN    Or      LORazepam, 4 mg, Q1H PRN      sodium chloride, 30 mL, PRN      HYDROcodone-acetaminophen, 1 tablet, Q6H PRN      loperamide, 2 mg, 4x Daily PRN      sodium chloride flush, 10 mL, PRN      promethazine, 12.5 mg, Q6H PRN    Or      ondansetron, 4 mg, Q6H PRN      polyethylene glycol, 17 g, Daily PRN      acetaminophen, 650 mg, Q6H PRN    Or      acetaminophen, 650 mg, Q6H PRN         Objective:    BP (!) 166/89   Pulse 89   Temp 99.2 °F (37.3 °C) (Bladder)   Resp 20   Ht 6' (1.829 m)   Wt 194 lb 3.6 oz (88.1 kg)   SpO2 94%   BMI 26.34 kg/m²   General Appearance: alert and oriented to person, place and time, well-developed and well-nourished, in no acute distress  Skin: warm and dry, no rash or erythema  Head: normocephalic and atraumatic  Eyes: pupils equal, round, and reactive to light, extraocular eye movements intact, conjunctivae normal  ENT: tympanic membrane, external ear and ear canal normal bilaterally, oropharynx clear and moist with normal mucous membranes  Neck: neck supple and non tender without mass, no thyromegaly or thyroid nodules, no cervical lymphadenopathy   Pulmonary/Chest: clear to auscultation bilaterally- no wheezes, rales or rhonchi, normal air movement, no respiratory distress  Cardiovascular: normal rate, normal S1 and S2, no gallops, intact distal pulses and no carotid bruits  Abdomen: soft, non-tender, non-distended, normal bowel sounds, no masses or organomegaly  With patient in Covid isolation, examination in addition to personal encounter also includes a information from talking with the nursing as well as other specialty physicians.     Recent Labs     12/31/20  0525 01/01/21  0550 01/02/21  0625    143 141   K 3.7 4.3 3.5    96* 104   CO2 27 28 29   BUN 21 30* 36*   CREATININE 1.3* 1.3* 1.3*   GLUCOSE 135* 143* 132*   CALCIUM 8.0* 8.3* 8.5*       Recent Labs     12/31/20  0525 01/01/21  0550 01/02/21  0625   WBC 4.6 4.9 5.6   RBC 3.15* 3.14* 3.11*   HGB 10.8* 10.7* 10.4*   HCT 33.6* 33.8* 33.4*   .7* 107.6* 107.4*   MCH 34.3 34.1 33.4   MCHC 32.1 31.7* 31.1*   RDW 13.1 12.9 12.7    237 254   MPV 11.2 11.6 11.8     Creatinine 1.3  Hemoglobin 10.7    Radiology:   XR CHEST PORTABLE   Final Result   Bilateral pulmonary infiltrates, with improved aeration of the right upper   lung. XR ABDOMEN FOR NG/OG/NE TUBE PLACEMENT   Final Result   NG tube is in the stomach      XR CHEST PORTABLE   Final Result   Increasing right lung airspace opacities compared to prior study. XR HIP 2-3 VW W PELVIS LEFT   Final Result   1. Negative for fracture or acute osseous abnormality. 2.  Chronic findings, as above      XR RIBS LEFT INCLUDE CHEST (MIN 3 VIEWS)   Final Result   1. There is no appreciable left rib fracture or pneumothorax   2. Multifocal pneumonia more prominent within the right upper lobe. XR CHEST PORTABLE    (Results Pending)       Assessment:    Active Problems:    DVT (deep venous thrombosis) ([facility])    CHF with unknown LVEF ([facility])    COVID-19    Hypokalemia    Hypomagnesemia    Fever and chills    Hypoxia    Acute hypoxemic respiratory failure due to severe acute respiratory syndrome coronavirus 2 (SARS-CoV-2) disease ([facility])  Resolved Problems:    * No resolved hospital problems. *      Plan:  1. Acute hypoxic respiratory failure, steady improvement, at this time remains on 5 L of supplemental oxygen with nasal cannula. 2. More due to Covid pneumonia, patient remains on Decadron, folic acid, remdesivir day 3 of 4, thiamine, apixaban. 3. Concern over possible aspiration pneumonia, remains on broad-spectrum coverage with Zosyn, day 4.  4. Known prior left lower extremity DVT, on anticoagulation with Eliquis.   5. Electrolyte imbalance, improved.         Electronically signed by Nino Boo MD on 1/2/2021 at 11:24 AM

## 2021-01-02 NOTE — PLAN OF CARE
Problem: Falls - Risk of:  Goal: Will remain free from falls  Description: Will remain free from falls  Outcome: Met This Shift  Goal: Absence of physical injury  Description: Absence of physical injury  Outcome: Met This Shift     Problem: Pain:  Goal: Pain level will decrease  Description: Pain level will decrease  Outcome: Met This Shift  Goal: Control of acute pain  Description: Control of acute pain  Outcome: Met This Shift     Problem: Airway Clearance - Ineffective  Goal: Achieve or maintain patent airway  Outcome: Met This Shift     Problem: Gas Exchange - Impaired  Goal: Absence of hypoxia  Outcome: Met This Shift     Problem: Breathing Pattern - Ineffective  Goal: Ability to achieve and maintain a regular respiratory rate  Outcome: Met This Shift     Problem:  Body Temperature -  Risk of, Imbalanced  Goal: Ability to maintain a body temperature within defined limits  Outcome: Met This Shift  Goal: Complications related to the disease process, condition or treatment will be avoided or minimized  Outcome: Met This Shift     Problem: Isolation Precautions - Risk of Spread of Infection  Goal: Prevent transmission of infection  Outcome: Met This Shift     Problem: Fluid and Electrolyte Imbalance  Goal: Fluid and electrolyte balance are achieved/maintained  Outcome: Not Met This Shift     Problem: Skin Integrity:  Goal: Will show no infection signs and symptoms  Description: Will show no infection signs and symptoms  Outcome: Not Met This Shift

## 2021-01-03 ENCOUNTER — APPOINTMENT (OUTPATIENT)
Dept: GENERAL RADIOLOGY | Age: 76
DRG: 871 | End: 2021-01-03
Payer: MEDICARE

## 2021-01-03 LAB
ANION GAP SERPL CALCULATED.3IONS-SCNC: 10 MMOL/L (ref 7–16)
BASOPHILS ABSOLUTE: 0 E9/L (ref 0–0.2)
BASOPHILS RELATIVE PERCENT: 0 % (ref 0–2)
BUN BLDV-MCNC: 27 MG/DL (ref 8–23)
CALCIUM SERPL-MCNC: 8.7 MG/DL (ref 8.6–10.2)
CHLORIDE BLD-SCNC: 104 MMOL/L (ref 98–107)
CO2: 27 MMOL/L (ref 22–29)
CREAT SERPL-MCNC: 1.1 MG/DL (ref 0.7–1.2)
EOSINOPHILS ABSOLUTE: 0 E9/L (ref 0.05–0.5)
EOSINOPHILS RELATIVE PERCENT: 0 % (ref 0–6)
GFR AFRICAN AMERICAN: >60
GFR NON-AFRICAN AMERICAN: >60 ML/MIN/1.73
GLUCOSE BLD-MCNC: 103 MG/DL (ref 74–99)
HCT VFR BLD CALC: 34.1 % (ref 37–54)
HEMOGLOBIN: 10.7 G/DL (ref 12.5–16.5)
LYMPHOCYTES ABSOLUTE: 0.78 E9/L (ref 1.5–4)
LYMPHOCYTES RELATIVE PERCENT: 12.2 % (ref 20–42)
MAGNESIUM: 2 MG/DL (ref 1.6–2.6)
MCH RBC QN AUTO: 33.8 PG (ref 26–35)
MCHC RBC AUTO-ENTMCNC: 31.4 % (ref 32–34.5)
MCV RBC AUTO: 107.6 FL (ref 80–99.9)
MONOCYTES ABSOLUTE: 0.26 E9/L (ref 0.1–0.95)
MONOCYTES RELATIVE PERCENT: 3.5 % (ref 2–12)
NEUTROPHILS ABSOLUTE: 5.46 E9/L (ref 1.8–7.3)
NEUTROPHILS RELATIVE PERCENT: 84.3 % (ref 43–80)
NUCLEATED RED BLOOD CELLS: 0 /100 WBC
PDW BLD-RTO: 12.8 FL (ref 11.5–15)
PHOSPHORUS: 2.4 MG/DL (ref 2.5–4.5)
PLATELET # BLD: 283 E9/L (ref 130–450)
PMV BLD AUTO: 11.5 FL (ref 7–12)
POTASSIUM SERPL-SCNC: 3.2 MMOL/L (ref 3.5–5)
RBC # BLD: 3.17 E12/L (ref 3.8–5.8)
SODIUM BLD-SCNC: 141 MMOL/L (ref 132–146)
WBC # BLD: 6.5 E9/L (ref 4.5–11.5)

## 2021-01-03 PROCEDURE — 36415 COLL VENOUS BLD VENIPUNCTURE: CPT

## 2021-01-03 PROCEDURE — 94660 CPAP INITIATION&MGMT: CPT

## 2021-01-03 PROCEDURE — 84100 ASSAY OF PHOSPHORUS: CPT

## 2021-01-03 PROCEDURE — 6360000002 HC RX W HCPCS: Performed by: INTERNAL MEDICINE

## 2021-01-03 PROCEDURE — 2700000000 HC OXYGEN THERAPY PER DAY

## 2021-01-03 PROCEDURE — 6370000000 HC RX 637 (ALT 250 FOR IP): Performed by: INTERNAL MEDICINE

## 2021-01-03 PROCEDURE — 85025 COMPLETE CBC W/AUTO DIFF WBC: CPT

## 2021-01-03 PROCEDURE — 2580000003 HC RX 258: Performed by: INTERNAL MEDICINE

## 2021-01-03 PROCEDURE — 80048 BASIC METABOLIC PNL TOTAL CA: CPT

## 2021-01-03 PROCEDURE — 83735 ASSAY OF MAGNESIUM: CPT

## 2021-01-03 PROCEDURE — 99233 SBSQ HOSP IP/OBS HIGH 50: CPT | Performed by: INTERNAL MEDICINE

## 2021-01-03 PROCEDURE — 2060000000 HC ICU INTERMEDIATE R&B

## 2021-01-03 PROCEDURE — 71045 X-RAY EXAM CHEST 1 VIEW: CPT

## 2021-01-03 PROCEDURE — 2500000003 HC RX 250 WO HCPCS: Performed by: INTERNAL MEDICINE

## 2021-01-03 PROCEDURE — 51702 INSERT TEMP BLADDER CATH: CPT

## 2021-01-03 RX ORDER — FONDAPARINUX SODIUM 2.5 MG/.5ML
2.5 INJECTION SUBCUTANEOUS DAILY
Status: DISCONTINUED | OUTPATIENT
Start: 2021-01-04 | End: 2021-01-04

## 2021-01-03 RX ORDER — BUMETANIDE 0.25 MG/ML
0.5 INJECTION, SOLUTION INTRAMUSCULAR; INTRAVENOUS DAILY
Status: DISCONTINUED | OUTPATIENT
Start: 2021-01-03 | End: 2021-01-07

## 2021-01-03 RX ORDER — HYDRALAZINE HYDROCHLORIDE 20 MG/ML
20 INJECTION INTRAMUSCULAR; INTRAVENOUS EVERY 4 HOURS PRN
Status: DISCONTINUED | OUTPATIENT
Start: 2021-01-03 | End: 2021-01-07 | Stop reason: HOSPADM

## 2021-01-03 RX ORDER — SPIRONOLACTONE 25 MG/1
25 TABLET ORAL DAILY
Status: DISCONTINUED | OUTPATIENT
Start: 2021-01-04 | End: 2021-01-07 | Stop reason: HOSPADM

## 2021-01-03 RX ORDER — METOPROLOL TARTRATE 50 MG/1
50 TABLET, FILM COATED ORAL ONCE
Status: COMPLETED | OUTPATIENT
Start: 2021-01-03 | End: 2021-01-03

## 2021-01-03 RX ORDER — HYDROCODONE BITARTRATE AND ACETAMINOPHEN 7.5; 325 MG/1; MG/1
1 TABLET ORAL ONCE
Status: COMPLETED | OUTPATIENT
Start: 2021-01-03 | End: 2021-01-03

## 2021-01-03 RX ORDER — METOPROLOL TARTRATE 50 MG/1
100 TABLET, FILM COATED ORAL 2 TIMES DAILY
Status: DISCONTINUED | OUTPATIENT
Start: 2021-01-03 | End: 2021-01-06

## 2021-01-03 RX ADMIN — METOPROLOL TARTRATE 100 MG: 50 TABLET, FILM COATED ORAL at 20:26

## 2021-01-03 RX ADMIN — CLOTRIMAZOLE: 10 CREAM TOPICAL at 09:00

## 2021-01-03 RX ADMIN — Medication 10 ML: at 20:30

## 2021-01-03 RX ADMIN — METOPROLOL TARTRATE 50 MG: 50 TABLET, FILM COATED ORAL at 09:00

## 2021-01-03 RX ADMIN — PANCRELIPASE 36000 UNITS: 60000; 12000; 38000 CAPSULE, DELAYED RELEASE PELLETS ORAL at 17:38

## 2021-01-03 RX ADMIN — SODIUM CHLORIDE: 9 INJECTION, SOLUTION INTRAVENOUS at 16:00

## 2021-01-03 RX ADMIN — DEXAMETHASONE 6 MG: 4 TABLET ORAL at 09:00

## 2021-01-03 RX ADMIN — PIPERACILLIN AND TAZOBACTAM 3.38 G: 3; .375 INJECTION, POWDER, LYOPHILIZED, FOR SOLUTION INTRAVENOUS at 11:15

## 2021-01-03 RX ADMIN — METOPROLOL TARTRATE 50 MG: 50 TABLET, FILM COATED ORAL at 13:54

## 2021-01-03 RX ADMIN — BUMETANIDE 0.5 MG: 0.25 INJECTION INTRAMUSCULAR; INTRAVENOUS at 10:01

## 2021-01-03 RX ADMIN — HYDROCODONE BITARTRATE AND ACETAMINOPHEN 1 TABLET: 7.5; 325 TABLET ORAL at 03:03

## 2021-01-03 RX ADMIN — PANCRELIPASE 36000 UNITS: 60000; 12000; 38000 CAPSULE, DELAYED RELEASE PELLETS ORAL at 09:00

## 2021-01-03 RX ADMIN — PIPERACILLIN AND TAZOBACTAM 3.38 G: 3; .375 INJECTION, POWDER, LYOPHILIZED, FOR SOLUTION INTRAVENOUS at 20:28

## 2021-01-03 RX ADMIN — PANCRELIPASE 36000 UNITS: 60000; 12000; 38000 CAPSULE, DELAYED RELEASE PELLETS ORAL at 11:15

## 2021-01-03 RX ADMIN — CLOPIDOGREL 75 MG: 75 TABLET, FILM COATED ORAL at 09:00

## 2021-01-03 RX ADMIN — CLOTRIMAZOLE: 10 CREAM TOPICAL at 20:29

## 2021-01-03 RX ADMIN — SODIUM CHLORIDE: 9 INJECTION, SOLUTION INTRAVENOUS at 00:03

## 2021-01-03 RX ADMIN — SACUBITRIL AND VALSARTAN 0.5 TABLET: 24; 26 TABLET, FILM COATED ORAL at 09:00

## 2021-01-03 RX ADMIN — APIXABAN 5 MG: 5 TABLET, FILM COATED ORAL at 20:26

## 2021-01-03 RX ADMIN — Medication 100 MG: at 09:00

## 2021-01-03 RX ADMIN — ALLOPURINOL 300 MG: 300 TABLET ORAL at 10:01

## 2021-01-03 RX ADMIN — PIPERACILLIN AND TAZOBACTAM 3.38 G: 3; .375 INJECTION, POWDER, LYOPHILIZED, FOR SOLUTION INTRAVENOUS at 02:49

## 2021-01-03 RX ADMIN — FOLIC ACID 1 MG: 1 TABLET ORAL at 09:00

## 2021-01-03 RX ADMIN — SACUBITRIL AND VALSARTAN 0.5 TABLET: 24; 26 TABLET, FILM COATED ORAL at 20:26

## 2021-01-03 RX ADMIN — SODIUM CHLORIDE, PRESERVATIVE FREE 300 UNITS: 5 INJECTION INTRAVENOUS at 09:00

## 2021-01-03 RX ADMIN — APIXABAN 5 MG: 5 TABLET, FILM COATED ORAL at 09:00

## 2021-01-03 RX ADMIN — SODIUM CHLORIDE: 9 INJECTION, SOLUTION INTRAVENOUS at 07:00

## 2021-01-03 RX ADMIN — Medication 10 ML: at 09:00

## 2021-01-03 RX ADMIN — HYDROCODONE BITARTRATE AND ACETAMINOPHEN 1 TABLET: 7.5; 325 TABLET ORAL at 13:53

## 2021-01-03 RX ADMIN — PANTOPRAZOLE SODIUM 40 MG: 40 TABLET, DELAYED RELEASE ORAL at 06:16

## 2021-01-03 RX ADMIN — HYDRALAZINE HYDROCHLORIDE 10 MG: 20 INJECTION INTRAMUSCULAR; INTRAVENOUS at 10:21

## 2021-01-03 RX ADMIN — SODIUM CHLORIDE, PRESERVATIVE FREE 300 UNITS: 5 INJECTION INTRAVENOUS at 20:27

## 2021-01-03 RX ADMIN — HYDROCODONE BITARTRATE AND ACETAMINOPHEN 1 TABLET: 7.5; 325 TABLET ORAL at 20:26

## 2021-01-03 RX ADMIN — MULTIVITAMIN TABLET 1 TABLET: TABLET at 09:00

## 2021-01-03 ASSESSMENT — PAIN SCALES - GENERAL
PAINLEVEL_OUTOF10: 6
PAINLEVEL_OUTOF10: 0
PAINLEVEL_OUTOF10: 6
PAINLEVEL_OUTOF10: 0
PAINLEVEL_OUTOF10: 6

## 2021-01-03 NOTE — PROGRESS NOTES
[facility]    Admitting Date and Time: 12/28/2020  8:04 AM  Admit Dx: Acute hypoxemic respiratory failure due to severe acute respiratory syndrome coronavirus 2 (SARS-CoV-2) disease ([facility]) [U07.1, J96.01]    Subjective: Admitted on 28th, patient with known systolic heart failure, chronic leg DVT, alcohol use, presented with weakness, fatigue, cough, shortness of breath, poor appetite, change in taste. COVID-19 positive. Also electrolyte imbalance. Seen by cardiology, remains on low-dose neprilysin inhibitors. Patient with respiratory distress and hypoxemia on 29th evening. Observed in ICU. Now is transferred out. Patient was admitted with Acute hypoxemic respiratory failure due to severe acute respiratory syndrome coronavirus 2 (SARS-CoV-2) disease ([facility]) [U07.1, J96.01]Patient noted  sitting comfortably, on nasal cannula 5 L, maintaining saturations well. Per RN: BP not well controlled. Hydralazine 10 was ordered. Nursing did speak to his partner, patient with longstanding alcohol use, recently decreased after Covid, patient had been on Precedex drip till yesterday. ROS: denies fever, chills, cp, sob, n/v, HA unless stated above.      sacubitril-valsartan  0.5 tablet Oral BID    bumetanide  0.5 mg Intravenous Daily    thiamine  100 mg Oral Daily    multivitamin  1 tablet Oral Daily    pantoprazole  40 mg Oral QAM AC    metoprolol tartrate  50 mg Oral BID    clotrimazole   Topical BID    heparin flush  3 mL Intravenous 2 times per day    folic acid  1 mg Oral Daily    clopidogrel  75 mg Oral Daily    piperacillin-tazobactam  3.375 g Intravenous Q8H    allopurinol  300 mg Oral Daily    apixaban  5 mg Oral BID    lipase-protease-amylase  36,000 Units Oral TID WC    sodium chloride flush  10 mL Intravenous 2 times per day    dexamethasone  6 mg Oral Daily         potassium chloride, 20 mEq, PRN      hydrALAZINE, 10 mg, Q4H PRN      sodium chloride flush, 10 mL, PRN      heparin flush, 3 mL, PRN      LORazepam, 1 mg, Q1H PRN    Or      LORazepam, 1 mg, Q1H PRN    Or      LORazepam, 2 mg, Q1H PRN    Or      LORazepam, 2 mg, Q1H PRN    Or      LORazepam, 3 mg, Q1H PRN    Or      LORazepam, 3 mg, Q1H PRN    Or      LORazepam, 4 mg, Q1H PRN    Or      LORazepam, 4 mg, Q1H PRN      sodium chloride, 30 mL, PRN      HYDROcodone-acetaminophen, 1 tablet, Q6H PRN      loperamide, 2 mg, 4x Daily PRN      promethazine, 12.5 mg, Q6H PRN    Or      ondansetron, 4 mg, Q6H PRN      polyethylene glycol, 17 g, Daily PRN      acetaminophen, 650 mg, Q6H PRN    Or      acetaminophen, 650 mg, Q6H PRN         Objective:    BP (!) 186/82   Pulse 85   Temp 97.5 °F (36.4 °C) (Oral)   Resp 20   Ht 6' (1.829 m)   Wt 194 lb 3.6 oz (88.1 kg)   SpO2 97%   BMI 26.34 kg/m²   General Appearance: alert and oriented to person, place and time, well-developed and well-nourished, in no acute distress  Skin: warm and dry, no rash or erythema  Head: normocephalic and atraumatic  Eyes: pupils equal, round, and reactive to light, extraocular eye movements intact, conjunctivae normal  ENT: tympanic membrane, external ear and ear canal normal bilaterally, oropharynx clear and moist with normal mucous membranes  Neck: neck supple and non tender without mass, no thyromegaly or thyroid nodules, no cervical lymphadenopathy   Pulmonary/Chest: clear to auscultation bilaterally- no wheezes, rales or rhonchi, normal air movement, no respiratory distress  Cardiovascular: normal rate, normal S1 and S2, no gallops, intact distal pulses and no carotid bruits  Abdomen: soft, non-tender, non-distended, normal bowel sounds, no masses or organomegaly  With patient in Covid isolation, examination in addition to personal encounter also includes a information from talking with the nursing as well as other specialty physicians.     Recent Labs     01/01/21  0550 01/02/21  0625 01/03/21  0300  141 141   K 4.3 3.5 3.2*   CL 96* 104 104   CO2 28 29 27   BUN 30* 36* 27*   CREATININE 1.3* 1.3* 1.1   GLUCOSE 143* 132* 103*   CALCIUM 8.3* 8.5* 8.7       Recent Labs     01/01/21  0550 01/02/21  0625 01/03/21  0300   WBC 4.9 5.6 6.5   RBC 3.14* 3.11* 3.17*   HGB 10.7* 10.4* 10.7*   HCT 33.8* 33.4* 34.1*   .6* 107.4* 107.6*   MCH 34.1 33.4 33.8   MCHC 31.7* 31.1* 31.4*   RDW 12.9 12.7 12.8    254 283   MPV 11.6 11.8 11.5     Creatinine 1.3  Hemoglobin 10.7    Radiology:   XR CHEST PORTABLE   Final Result   Bilateral pulmonary infiltrates, with improved aeration of the right upper   lung. XR ABDOMEN FOR NG/OG/NE TUBE PLACEMENT   Final Result   NG tube is in the stomach      XR CHEST PORTABLE   Final Result   Increasing right lung airspace opacities compared to prior study. XR HIP 2-3 VW W PELVIS LEFT   Final Result   1. Negative for fracture or acute osseous abnormality. 2.  Chronic findings, as above      XR RIBS LEFT INCLUDE CHEST (MIN 3 VIEWS)   Final Result   1. There is no appreciable left rib fracture or pneumothorax   2. Multifocal pneumonia more prominent within the right upper lobe. XR CHEST PORTABLE    (Results Pending)       Assessment:    Active Problems:    DVT (deep venous thrombosis) ([facility])    CHF with unknown LVEF ([facility])    COVID-19    Hypokalemia    Hypomagnesemia    Fever and chills    Hypoxia    Acute hypoxemic respiratory failure due to severe acute respiratory syndrome coronavirus 2 (SARS-CoV-2) disease ([facility])  Resolved Problems:    * No resolved hospital problems. *      Plan:  1. Acute hypoxic respiratory failure, steady improvement, at this time remains on 5 L of supplemental oxygen with nasal cannula. 2. More due to Covid pneumonia, patient remains on Decadron, folic acid, remdesivir -did receive his fourth dose today, apixaban. 3. Chronic alcoholism, patient is on thiamine, folic acid. Still on CIWA protocol.   4. Concern over possible aspiration pneumonia, remains on broad-spectrum coverage with Zosyn, day 5. No change, chest x-ray as of today showing stable multifocal pneumonia, pulmonary following. 5. Hypertension, not well controlled, in part due to alcohol withdrawal, also patient not getting his Aldactone, ESTUARDO has improved, patient is getting his metoprolol as well as Entresto. Cardiology on board. For now we will increase metoprolol to 100 mg twice daily, also additional 50 milligrams now  6. Known prior left lower extremity DVT, on anticoagulation with Eliquis. 7. Electrolyte imbalance, improved.         Electronically signed by Salvatore Campa MD on 1/3/2021 at 8:06 AM

## 2021-01-03 NOTE — PROGRESS NOTES
bumetanide  0.5 mg Intravenous Daily    metoprolol tartrate  100 mg Oral BID    thiamine  100 mg Oral Daily    multivitamin  1 tablet Oral Daily    pantoprazole  40 mg Oral QAM AC    clotrimazole   Topical BID    heparin flush  3 mL Intravenous 2 times per day    folic acid  1 mg Oral Daily    clopidogrel  75 mg Oral Daily    piperacillin-tazobactam  3.375 g Intravenous Q8H    allopurinol  300 mg Oral Daily    apixaban  5 mg Oral BID    lipase-protease-amylase  36,000 Units Oral TID WC    sodium chloride flush  10 mL Intravenous 2 times per day    dexamethasone  6 mg Oral Daily       Physical Exam:  General Appearance: appears comfortable in no acute distress. HEENT: Normocephalic atraumatic without obvious abnormality   Neck: Lips, mucosa, and tongue normal.  Supple, symmetrical, trachea midline, no adenopathy;thyroid:  no enlargement/tenderness/nodules or JVD. Lung: Breath sounds CTA. Respirations   unlabored. Symmetrical expansion. Heart: RRR, normal S1, S2. No MRG  Abdomen: Soft, NT, ND. BS present x 4 quadrants. No bruit or organomegaly. Extremities: Pedal pulses 2+ symmetric b/l. Extremities normal, no cyanosis, clubbing, or edema. Musculokeletal: No joint swelling, no muscle tenderness. ROM normal in all joints of extremities. Neurologic: Mental status: Alert and Oriented X3 .     Pertinent/ New Labs and Imaging Studies     Imaging Personally Reviewed:      ECHO      Labs:  Lab Results   Component Value Date    WBC 6.5 01/03/2021    HGB 10.7 01/03/2021    HCT 34.1 01/03/2021    .6 01/03/2021    MCH 33.8 01/03/2021    MCHC 31.4 01/03/2021    RDW 12.8 01/03/2021     01/03/2021    MPV 11.5 01/03/2021     Lab Results   Component Value Date     01/03/2021    K 3.2 01/03/2021    K 3.5 12/29/2020     01/03/2021    CO2 27 01/03/2021    BUN 27 01/03/2021    CREATININE 1.1 01/03/2021    LABALBU 2.7 01/02/2021    CALCIUM 8.7 01/03/2021    GFRAA >60 01/03/2021    LABGLOM >60 01/03/2021     Lab Results   Component Value Date    PROTIME 12.0 12/28/2020    INR 1.0 12/28/2020     No results for input(s): PROBNP in the last 72 hours. No results for input(s): PROCAL in the last 72 hours. This SmartLink has not been configured with any valid records. Micro:  No results for input(s): CULTRESP in the last 72 hours. No results for input(s): LABGRAM in the last 72 hours. No results for input(s): LEGUR in the last 72 hours. No results for input(s): STREPNEUMAGU in the last 72 hours. No results for input(s): LP1UAG in the last 72 hours. Assessment:    1. Acute hypoxic respiratory failure   2. Aspiration pneumonia   3. chf   4. DT   5. Covid 19   6. htn  7. dvt       Plan:   1. Abx for aspiration pna   2. Oxygen wean as tolerated, bipap   3. Bronchodilators   4. Cxr in am   5. bp per primary     Johnny Leblanc M.D.    Pulmonary/Critical Care Medicine

## 2021-01-03 NOTE — PROGRESS NOTES
Date: 1/2/2021    Time: 10:57 PM    Patient Placed On BIPAP/CPAP/ Non-Invasive Ventilation? No    If no must comment. Facial area red/color change? No           If YES are Blister/Lesion present? No   If yes must notify nursing staff  BIPAP/CPAP skin barrier? No    Skin barrier type:n/a       Comments: pt refused the bipap this evening. Pt resting comfortably on 5L NC at this time. Machine is in room on standby if needed.          Chuy Bauman

## 2021-01-04 LAB
ANION GAP SERPL CALCULATED.3IONS-SCNC: 10 MMOL/L (ref 7–16)
BASOPHILS ABSOLUTE: 0.01 E9/L (ref 0–0.2)
BASOPHILS RELATIVE PERCENT: 0.2 % (ref 0–2)
BLOOD CULTURE, ROUTINE: NORMAL
BLOOD CULTURE, ROUTINE: NORMAL
BUN BLDV-MCNC: 23 MG/DL (ref 8–23)
CALCIUM SERPL-MCNC: 8.9 MG/DL (ref 8.6–10.2)
CHLORIDE BLD-SCNC: 102 MMOL/L (ref 98–107)
CO2: 30 MMOL/L (ref 22–29)
CREAT SERPL-MCNC: 1 MG/DL (ref 0.7–1.2)
EOSINOPHILS ABSOLUTE: 0.06 E9/L (ref 0.05–0.5)
EOSINOPHILS RELATIVE PERCENT: 1.1 % (ref 0–6)
GFR AFRICAN AMERICAN: >60
GFR NON-AFRICAN AMERICAN: >60 ML/MIN/1.73
GLUCOSE BLD-MCNC: 135 MG/DL (ref 74–99)
HCT VFR BLD CALC: 36.6 % (ref 37–54)
HEMOGLOBIN: 11.3 G/DL (ref 12.5–16.5)
IMMATURE GRANULOCYTES #: 0.03 E9/L
IMMATURE GRANULOCYTES %: 0.6 % (ref 0–5)
LYMPHOCYTES ABSOLUTE: 1.07 E9/L (ref 1.5–4)
LYMPHOCYTES RELATIVE PERCENT: 20.5 % (ref 20–42)
MAGNESIUM: 2.2 MG/DL (ref 1.6–2.6)
MCH RBC QN AUTO: 33.6 PG (ref 26–35)
MCHC RBC AUTO-ENTMCNC: 30.9 % (ref 32–34.5)
MCV RBC AUTO: 108.9 FL (ref 80–99.9)
MONOCYTES ABSOLUTE: 0.32 E9/L (ref 0.1–0.95)
MONOCYTES RELATIVE PERCENT: 6.1 % (ref 2–12)
NEUTROPHILS ABSOLUTE: 3.73 E9/L (ref 1.8–7.3)
NEUTROPHILS RELATIVE PERCENT: 71.5 % (ref 43–80)
PDW BLD-RTO: 12.8 FL (ref 11.5–15)
PHOSPHORUS: 3.4 MG/DL (ref 2.5–4.5)
PLATELET # BLD: 307 E9/L (ref 130–450)
PMV BLD AUTO: 11.6 FL (ref 7–12)
POTASSIUM SERPL-SCNC: 3.2 MMOL/L (ref 3.5–5)
RBC # BLD: 3.36 E12/L (ref 3.8–5.8)
RBC # BLD: NORMAL 10*6/UL
SODIUM BLD-SCNC: 142 MMOL/L (ref 132–146)
WBC # BLD: 5.2 E9/L (ref 4.5–11.5)

## 2021-01-04 PROCEDURE — 6360000002 HC RX W HCPCS: Performed by: INTERNAL MEDICINE

## 2021-01-04 PROCEDURE — 97535 SELF CARE MNGMENT TRAINING: CPT

## 2021-01-04 PROCEDURE — 6370000000 HC RX 637 (ALT 250 FOR IP): Performed by: INTERNAL MEDICINE

## 2021-01-04 PROCEDURE — 84100 ASSAY OF PHOSPHORUS: CPT

## 2021-01-04 PROCEDURE — 97162 PT EVAL MOD COMPLEX 30 MIN: CPT

## 2021-01-04 PROCEDURE — 2500000003 HC RX 250 WO HCPCS: Performed by: INTERNAL MEDICINE

## 2021-01-04 PROCEDURE — 97165 OT EVAL LOW COMPLEX 30 MIN: CPT

## 2021-01-04 PROCEDURE — 36415 COLL VENOUS BLD VENIPUNCTURE: CPT

## 2021-01-04 PROCEDURE — 83735 ASSAY OF MAGNESIUM: CPT

## 2021-01-04 PROCEDURE — 2580000003 HC RX 258: Performed by: INTERNAL MEDICINE

## 2021-01-04 PROCEDURE — 80048 BASIC METABOLIC PNL TOTAL CA: CPT

## 2021-01-04 PROCEDURE — 51702 INSERT TEMP BLADDER CATH: CPT

## 2021-01-04 PROCEDURE — 2060000000 HC ICU INTERMEDIATE R&B

## 2021-01-04 PROCEDURE — 2700000000 HC OXYGEN THERAPY PER DAY

## 2021-01-04 PROCEDURE — 85025 COMPLETE CBC W/AUTO DIFF WBC: CPT

## 2021-01-04 PROCEDURE — 94660 CPAP INITIATION&MGMT: CPT

## 2021-01-04 PROCEDURE — 99232 SBSQ HOSP IP/OBS MODERATE 35: CPT | Performed by: INTERNAL MEDICINE

## 2021-01-04 RX ORDER — FONDAPARINUX SODIUM 7.5 MG/.6ML
7.5 INJECTION SUBCUTANEOUS DAILY
Status: DISCONTINUED | OUTPATIENT
Start: 2021-01-04 | End: 2021-01-07

## 2021-01-04 RX ADMIN — CLOPIDOGREL 75 MG: 75 TABLET, FILM COATED ORAL at 10:45

## 2021-01-04 RX ADMIN — MULTIVITAMIN TABLET 1 TABLET: TABLET at 10:46

## 2021-01-04 RX ADMIN — Medication 10 ML: at 10:46

## 2021-01-04 RX ADMIN — HYDRALAZINE HYDROCHLORIDE 20 MG: 20 INJECTION INTRAMUSCULAR; INTRAVENOUS at 10:45

## 2021-01-04 RX ADMIN — ACETAMINOPHEN 650 MG: 325 TABLET ORAL at 10:45

## 2021-01-04 RX ADMIN — FOLIC ACID 1 MG: 1 TABLET ORAL at 10:46

## 2021-01-04 RX ADMIN — PANCRELIPASE 36000 UNITS: 60000; 12000; 38000 CAPSULE, DELAYED RELEASE PELLETS ORAL at 13:28

## 2021-01-04 RX ADMIN — SACUBITRIL AND VALSARTAN 1 TABLET: 24; 26 TABLET, FILM COATED ORAL at 13:41

## 2021-01-04 RX ADMIN — PIPERACILLIN AND TAZOBACTAM 3.38 G: 3; .375 INJECTION, POWDER, LYOPHILIZED, FOR SOLUTION INTRAVENOUS at 02:58

## 2021-01-04 RX ADMIN — SACUBITRIL AND VALSARTAN 1 TABLET: 49; 51 TABLET, FILM COATED ORAL at 19:58

## 2021-01-04 RX ADMIN — METOPROLOL TARTRATE 100 MG: 50 TABLET, FILM COATED ORAL at 10:45

## 2021-01-04 RX ADMIN — PANCRELIPASE 36000 UNITS: 60000; 12000; 38000 CAPSULE, DELAYED RELEASE PELLETS ORAL at 10:46

## 2021-01-04 RX ADMIN — SODIUM CHLORIDE, PRESERVATIVE FREE 300 UNITS: 5 INJECTION INTRAVENOUS at 19:59

## 2021-01-04 RX ADMIN — BUMETANIDE 0.5 MG: 0.25 INJECTION INTRAMUSCULAR; INTRAVENOUS at 10:46

## 2021-01-04 RX ADMIN — SODIUM CHLORIDE: 9 INJECTION, SOLUTION INTRAVENOUS at 17:11

## 2021-01-04 RX ADMIN — PANCRELIPASE 36000 UNITS: 60000; 12000; 38000 CAPSULE, DELAYED RELEASE PELLETS ORAL at 16:42

## 2021-01-04 RX ADMIN — CLOTRIMAZOLE: 10 CREAM TOPICAL at 19:59

## 2021-01-04 RX ADMIN — PIPERACILLIN AND TAZOBACTAM 3.38 G: 3; .375 INJECTION, POWDER, LYOPHILIZED, FOR SOLUTION INTRAVENOUS at 13:28

## 2021-01-04 RX ADMIN — Medication 10 ML: at 19:58

## 2021-01-04 RX ADMIN — HYDROCODONE BITARTRATE AND ACETAMINOPHEN 1 TABLET: 7.5; 325 TABLET ORAL at 13:29

## 2021-01-04 RX ADMIN — Medication 100 MG: at 10:45

## 2021-01-04 RX ADMIN — SODIUM CHLORIDE: 9 INJECTION, SOLUTION INTRAVENOUS at 06:40

## 2021-01-04 RX ADMIN — SODIUM CHLORIDE: 9 INJECTION, SOLUTION INTRAVENOUS at 00:30

## 2021-01-04 RX ADMIN — PIPERACILLIN AND TAZOBACTAM 3.38 G: 3; .375 INJECTION, POWDER, LYOPHILIZED, FOR SOLUTION INTRAVENOUS at 18:52

## 2021-01-04 RX ADMIN — HYDROCODONE BITARTRATE AND ACETAMINOPHEN 1 TABLET: 7.5; 325 TABLET ORAL at 19:59

## 2021-01-04 RX ADMIN — PANTOPRAZOLE SODIUM 40 MG: 40 TABLET, DELAYED RELEASE ORAL at 06:34

## 2021-01-04 RX ADMIN — CLOTRIMAZOLE: 10 CREAM TOPICAL at 10:47

## 2021-01-04 RX ADMIN — METOPROLOL TARTRATE 100 MG: 50 TABLET, FILM COATED ORAL at 19:58

## 2021-01-04 RX ADMIN — MAGNESIUM OXIDE TAB 400 MG (241.3 MG ELEMENTAL MG) 400 MG: 400 (241.3 MG) TAB at 16:42

## 2021-01-04 RX ADMIN — SODIUM CHLORIDE: 9 INJECTION, SOLUTION INTRAVENOUS at 23:25

## 2021-01-04 RX ADMIN — SODIUM CHLORIDE, PRESERVATIVE FREE 300 UNITS: 5 INJECTION INTRAVENOUS at 10:44

## 2021-01-04 RX ADMIN — FONDAPARINUX SODIUM 7.5 MG: 7.5 INJECTION, SOLUTION SUBCUTANEOUS at 10:45

## 2021-01-04 RX ADMIN — SPIRONOLACTONE 25 MG: 25 TABLET ORAL at 10:46

## 2021-01-04 RX ADMIN — ALLOPURINOL 300 MG: 300 TABLET ORAL at 13:29

## 2021-01-04 RX ADMIN — DEXAMETHASONE 6 MG: 4 TABLET ORAL at 10:45

## 2021-01-04 ASSESSMENT — PAIN SCALES - GENERAL
PAINLEVEL_OUTOF10: 6
PAINLEVEL_OUTOF10: 0

## 2021-01-04 ASSESSMENT — PAIN DESCRIPTION - PAIN TYPE: TYPE: ACUTE PAIN

## 2021-01-04 NOTE — PROGRESS NOTES
Beth Wilson M.D.,[facility]  Jaxson Kwan D.O., FMARIELAORudyI., Kody Parks M.D. Jose Flaherty M.D., Sandra Mann M.D. Delbert Graham D.O. Daily Pulmonary Progress Note    Patient:  Vicki Latham 66 y.o. male MRN: [id number]     Date of Service: 1/4/2021      Synopsis     We are following patient for acute hypoxic respiratory failure secondary to COVID-19 pneumonia    \"CC\" shortness of breath    Code status: Full code      Subjective      Patient seen and examined in no apparent distress he is currently on 10 L high flow oxygen with a sat of 90%.       Review of Systems:  Unable to assess due to mentation     Objective   Vitals: BP (!) 180/104   Pulse 96   Temp 101.7 °F (38.7 °C) (Axillary)   Resp 18   Ht 6' (1.829 m)   Wt 194 lb 3.6 oz (88.1 kg)   SpO2 90%   BMI 26.34 kg/m²     I/O:    Intake/Output Summary (Last 24 hours) at 1/4/2021 1247  Last data filed at 1/3/2021 1438  Gross per 24 hour   Intake --   Output 1800 ml   Net -1800 ml       Vent Information  Skin Assessment: Clean, dry, & intact  FiO2 : 40 %  SpO2: 90 %  SpO2/FiO2 ratio: 166.67  Mask Type: Full face mask  Mask Size: Medium       IPAP: 18 cmH20  CPAP/EPAP: 10 cmH2O     CURRENT MEDS :  Scheduled Meds:   fondaparinux  7.5 mg Subcutaneous Daily    bumetanide  0.5 mg Intravenous Daily    metoprolol tartrate  100 mg Oral BID    spironolactone  25 mg Oral Daily    sacubitril-valsartan  1 tablet Oral BID    thiamine  100 mg Oral Daily    multivitamin  1 tablet Oral Daily    pantoprazole  40 mg Oral QAM AC    clotrimazole   Topical BID    heparin flush  3 mL Intravenous 2 times per day    folic acid  1 mg Oral Daily    clopidogrel  75 mg Oral Daily    piperacillin-tazobactam  3.375 g Intravenous Q8H    allopurinol  300 mg Oral Daily    lipase-protease-amylase  36,000 Units Oral TID WC    sodium chloride flush  10 mL Intravenous 2 times per day    dexamethasone  6 mg Oral Daily Physical Exam:  Due to the current efforts to prevent transmission of COVID-19 and also the need to preserve PPE for other caregivers, a face-to-face encounter with the patient was not performed. That being said, all relevant records and diagnostic tests were reviewed, including laboratory results and imaging. Please reference any relevant documentation elsewhere. Care will be coordinated with the primary service. Pertinent/ New Labs and Imaging Studies     Imaging Personally Reviewed:  1/3/2021  1. Significant multifocal bilateral airspace disease more prominent on the   right. 2. Status post placement of a right-sided PICC line.  The catheter tip is at   the SVC right atrial junction. 3. Removal of the NG tube. ECHO  1/1/2021  Summary   Left ventricle grossly normal in size. Mild left ventricular concentric hypertrophy noted. Global hypokinesis is noted to be severe. Estimated left ventricular ejection fraction is 28±5%. <50% criteria for diastolic dysfunction. The LAESV Index is <34ml/m2. Normal right ventricular size. TAPSE is decreased   Physiologic and/or trace mitral regurgitation is present. Trace aortic regurgitation is noted. Physiologic and/or trace tricuspid regurgitation. RVSP is 41 mmHg. Technically good quality study. Technically difficult study due to patient''s body habitus. Suggest clinical correlation.       Labs:  Lab Results   Component Value Date    WBC 5.2 01/04/2021    HGB 11.3 01/04/2021    HCT 36.6 01/04/2021    .9 01/04/2021    MCH 33.6 01/04/2021    MCHC 30.9 01/04/2021    RDW 12.8 01/04/2021     01/04/2021    MPV 11.6 01/04/2021     Lab Results   Component Value Date     01/04/2021    K 3.2 01/04/2021    K 3.5 12/29/2020     01/04/2021    CO2 30 01/04/2021    BUN 23 01/04/2021    CREATININE 1.0 01/04/2021    LABALBU 2.7 01/02/2021    CALCIUM 8.9 01/04/2021    GFRAA >60 01/04/2021    LABGLOM >60 01/04/2021     Lab Results   Component Value Date    PROTIME 12.0 12/28/2020    INR 1.0 12/28/2020     No results for input(s): PROBNP in the last 72 hours. No results for input(s): PROCAL in the last 72 hours. This SmartLink has not been configured with any valid records. Assessment:    1. Acute hypoxic respiratory failure   2. Aspiration pneumonia   3. chf   4. DT   5. Covid 19   6. htn  7. dvt       Plan:   1. Abx for aspiration pna, stop Zosyn after day 7  2. Oxygen as needed,wean as tolerated, keep sats greater than 92%, currently on 10 L high flow oxygen  3. Remdesivir completed today  4. bipap overnight and as needed  5. Bronchodilators   6. Cxr 1/3, see above  7. bp per primary   8. Possible discharge to [facility] of care reviewed in collaboration with Dr. Trang Lebron, APRN-CNP    I personally saw, examined, and cared for the patient. Labs, medications, radiographs reviewed. I agree with history exam and plans detailed in NP note.     Electronically signed by Melania Montes MD on 1/4/2021 at 11:09 PM

## 2021-01-04 NOTE — PROGRESS NOTES
PROGRESS NOTE       PATIENT PROBLEM LIST:  Active Problems:    DVT (deep venous thrombosis) ([facility])    CHF with unknown LVEF ([facility])    COVID-19    Hypokalemia    Hypomagnesemia    Fever and chills    Hypoxia    Acute hypoxemic respiratory failure due to severe acute respiratory syndrome coronavirus 2 (SARS-CoV-2) disease ([facility])  Resolved Problems:    * No resolved hospital problems. *      SUBJECTIVE:  Radha First  states he feels quite well and denies any dyspnea, chest discomfort nor lightheadedness. He remains in good spirits. presently. REVIEW OF SYSTEMS:  General ROS: positive for  - fatigue  Psychological ROS:  negative for - anxiety and depression  Ophthalmic ROS:  negative for - decreased vision  or visual distortion. ENT ROS:  negative for - hearing change  Allergy and Immunology ROS: negative  Hematological and Lymphatic ROS: positive for - anemia  Endocrine ROS: negative  Respiratory ROS: positive for - shortness of breath and cough  Cardiovascular ROS: positive for - dyspnea on exertion, irregular heartbeat and shortness of breath  Gastrointestinal ROS: no abdominal pain, change in bowel habits, or black or bloody stools  Genito-Urinary ROS: no dysuria, trouble voiding, or hematuria  Musculoskeletal ROS: negative  Neurological ROS: no TIA or stroke symptoms otherwise no significant change in symptoms or problems since  1/1/2020 as documented in previous progress notes.     SCHEDULED MEDICATIONS:   fondaparinux  7.5 mg Subcutaneous Daily    bumetanide  0.5 mg Intravenous Daily    metoprolol tartrate  100 mg Oral BID    spironolactone  25 mg Oral Daily    sacubitril-valsartan  1 tablet Oral BID    thiamine  100 mg Oral Daily    multivitamin  1 tablet Oral Daily    pantoprazole  40 mg Oral QAM AC    clotrimazole   Topical BID    heparin flush  3 mL Intravenous 2 times per day    folic acid  1 mg Oral Daily    clopidogrel  75 mg Oral Daily    piperacillin-tazobactam  3.375 g Intravenous Q8H    allopurinol  300 mg Oral Daily    lipase-protease-amylase  36,000 Units Oral TID     sodium chloride flush  10 mL Intravenous 2 times per day    dexamethasone  6 mg Oral Daily       VITAL SIGNS:                                                                                                                          BP (!) 162/88   Pulse 87   Temp 98.2 °F (36.8 °C) (Oral)   Resp 18   Ht 6' (1.829 m)   Wt 194 lb 3.6 oz (88.1 kg)   SpO2 92%   BMI 26.34 kg/m²   Patient Vitals for the past 96 hrs (Last 3 readings):   Weight   01/02/21 0101 194 lb 3.6 oz (88.1 kg)   12/31/20 0520 193 lb 5.5 oz (87.7 kg)     OBJECTIVE:  General appearance: Well preserved, mesomorphic body habitus, alert, no distress. Skin: Skin color, texture, turgor normal. No rashes or lesions. No induration or tightening palpated. Head: Normocephalic. No masses, lesions, tenderness or abnormalities  Eyes: Conjunctivae/corneas clear. PERRL, EOMs intact. Sclera non icteric. Ears: External ears normal. Canals clear. TM's clear bilaterally. Hearing normal to finger rub. Nose/Sinuses: Nares normal. Septum midline. Mucosa normal. No drainage or sinus tenderness. Oropharynx: Lips, mucosa, and tongue normal. Oropharynx clear with no exudate seen. Neck: Neck supple and symmetric. No adenopathy. Thyroid symmetric, normal size, without nodules. Trachea is midline. Carotids brisk in upstroke without bruits, no abnormal JVP noted at 45°. Chest: Even excursion  Lungs: Lungs grossly clear to auscultation bilaterally. No retractions or use of accessory muscles. No tactile vocal fremitus. No rhonchi, crackles or rales. Heart:  S1 > S2. Regular, irregular rhythm. Ectopics noted. No gallop or murmur. No rub, palpable thrill or heave noted. PMI 5th intercostal space midclavicular line. Abdomen: Abdomen soft, moderately protuberant, non-tender. BS normal. No masses, organomegaly. No hernia noted.   Extremities: Extremities normal. No deformities, edema, or skin discoloration. No cyanosis or clubbing noted to the nails. Peripheral pulses present 2+ upper extremities and present 1+  lower extremities. Musculoskeletal: Spine ROM normal. Muscular strength intact. Neuro: Cranial nerves intact. Motor: Strength 5/5 in all extremities. Reflexes 2+ in all extremities. No focal weakness. Sensory: grossly normal to touch. Coordination intact. Data:   Scheduled Meds: Reviewed  Continuous Infusions:    sodium chloride 12.5 mL/hr at 01/03/21 1600       Intake/Output Summary (Last 24 hours) at 1/4/2021 0005  Last data filed at 1/3/2021 1438  Gross per 24 hour   Intake --   Output 1800 ml   Net -1800 ml     CBC:   Recent Labs     01/01/21  0550 01/02/21  0625 01/03/21  0300   WBC 4.9 5.6 6.5   HGB 10.7* 10.4* 10.7*   HCT 33.8* 33.4* 34.1*    254 283     BMP:  Recent Labs     01/01/21  0550 01/02/21  0625 01/03/21  0300    141 141   K 4.3 3.5 3.2*   CL 96* 104 104   CO2 28 29 27   BUN 30* 36* 27*   CREATININE 1.3* 1.3* 1.1   LABGLOM 53 53 >60     ABGs:   Lab Results   Component Value Date    PH 7.440 01/02/2021    PO2 66.5 01/02/2021    PCO2 34.3 01/02/2021     INR:   No results for input(s): INR in the last 72 hours. PRO-BNP:   Lab Results   Component Value Date    PROBNP 22,147 (H) 12/30/2020    PROBNP 2,583 (H) 12/28/2020      TSH:   Lab Results   Component Value Date    TSH 3.900 12/21/2019      Cardiac Injury Profile:   No results for input(s): CKTOTAL, CKMB, TROPONINI in the last 72 hours. Lipid Profile:   Lab Results   Component Value Date    TRIG 107 12/21/2019    HDL 74 12/21/2019    LDLCALC 75 12/21/2019    CHOL 170 12/21/2019      Hemoglobin A1C: No components found for: HGBA1C     RAD:   Xr Ribs Left Include Chest (min 3 Views)    Result Date: 12/28/2020  EXAMINATION: 5 XRAY VIEWS OF THE LEFT RIBS WITH FRONTAL XRAY VIEW OF THE CHEST 12/28/2020 9:17 am COMPARISON: None.  HISTORY: ORDERING SYSTEM PROVIDED HISTORY: Rib pain after fall TECHNOLOGIST PROVIDED HISTORY: Reason for exam:->Rib pain after fall FINDINGS: There is no right or left rib fracture. Multiple views the left wrist were obtained. There is no left pneumothorax or lung contusion. Multifocal bilateral pulmonary infiltrates are noted more prominent within the right upper lobe consistent with pneumonia. The heart is enlarged. 1. There is no appreciable left rib fracture or pneumothorax 2. Multifocal pneumonia more prominent within the right upper lobe. Xr Chest Portable    Result Date: 2020  EXAMINATION: ONE XRAY VIEW OF THE CHEST 2020 10:24 pm COMPARISON: 2020 HISTORY: ORDERING SYSTEM PROVIDED HISTORY: hjypoxia TECHNOLOGIST PROVIDED HISTORY: Reason for exam:->hjypoxia FINDINGS: Increasing right lung airspace opacities compared to prior study. The left lung is clear. No sizable effusion or pneumothorax. Stable cardiomegaly. No gross osseous abnormalities. Increasing right lung airspace opacities compared to prior study. Xr Hip 2-3 Vw W Pelvis Left    Result Date: 2020  EXAMINATION: ONE XRAY VIEW OF THE PELVIS AND TWO XRAY VIEWS LEFT HIP for three views total 2020 9:17 am COMPARISON: None. HISTORY: ORDERING SYSTEM PROVIDED HISTORY: fall - pain TECHNOLOGIST PROVIDED HISTORY: Reason for exam:->fall - pain FINDINGS: No fracture or dislocation. The sacroiliac and hip joints appear preserved for age. Pelvic phleboliths. Lumbar spine levoscoliosis and degenerative spondylosis. Atherosclerotic calcifications. 1.  Negative for fracture or acute osseous abnormality.  2.  Chronic findings, as above    NM Cardiac Stress Test Nuclear Imaging    Patient MRN:  [id number]   : 1945   Age: 66 years   Gender: Male   Order Date:  2019 4:45 PM   EXAM: NM MYOCARDIAL SPECT REST EXERCISE OR RX   Number of Images: 9 views   INDICATION:  Chest pain    Reason for Exam?->Chest pain   Procedure Type->Rx    COMPARISON: None       TECHNIQUE:   10 mCi of Tc-99m MIBI was injected intravenously at rest and cardiac   SPECT images were performed. In addition 31 mCi of Tc-99m MIBI was   injected intravenously at maximum stress by using Lexiscan stress. Stress SPECT images and gated study were performed.        FINDINGS:   Perfusion images demonstrate no reversible perfusion defect. Wall motion is hypokinetic   The end diastolic volume is 096 ml. The end systolic volume is 77 ml. The estimated ejection fraction is 28 %. Impression   1. No reversible perfusion defect   2. Ejection fraction is 28 %. 3. Wall motion appears to be hypokinetic       EKG: See Report  Echo: 12/21/2019  Summary   Ejection fraction is visually estimated at 40-45%. LV diastolic dysfunction   Trileaflet aortic valve. Normal leaflet mobility. No aortic stenosis. No aortic regurgitation. The left atrium is mildly dilated. Mildly dilated aortic root. Physiologic and/or trace mitral regurgitation is present. No evidence of hemodynamically significant mitral stenosis. No evidence for hemodynamically significant pericardial effusion. Normal right ventricle structure and function. Normal tricuspid valve structure and function. IMPRESSIONS:  Active Problems:    DVT (deep venous thrombosis) ([facility])    CHF with unknown LVEF ([facility])    COVID-19    Hypokalemia    Hypomagnesemia    Fever and chills    Hypoxia    Acute hypoxemic respiratory failure due to severe acute respiratory syndrome coronavirus 2 (SARS-CoV-2) disease ([facility])  Resolved Problems:    * No resolved hospital problems. *      RECOMMENDATIONS:  Mr. Taina Barriga appears to be tolerating introduction of neprilysin inhibitor considering his underlying marked decrease in left ventricular systolic function. Likewise based on most recent literature regarding thromboembolic prophylaxis for VIEPT-25 I have instituted a combination of fondaparinux and clopidogrel.  Hopefully this will be significant in decreasing exposure to pulmonary embolization associated with his underlying DVT. .    I have spent more than 25 minutes face to face with Samira Duke and reviewing notes and laboratory data, with greater than 50% of this time instructing and counseling the patient face to face regarding my findings and recommendations and I have answered all questions as posed to me by Mr. Huitronham Linda. Tony Manning, DO FACP,FACC,FSCAI      NOTE:  This report was transcribed using voice recognition software.   Every effort was made to ensure accuracy; however, inadvertent computerized transcription errors may be present

## 2021-01-04 NOTE — CARE COORDINATION
COVID positive, Remdesivir completed. Updated discharge plan of care. Pt transfer from ICU. Pt currently on hf 10l/nc and bp elevated. Medications adjusted by attending. Continue iv antibiotics.  Timoteo Garnett

## 2021-01-04 NOTE — PROGRESS NOTES
Occupational Therapy  OCCUPATIONAL THERAPY INITIAL EVALUATION      Date:2021  Patient Name: Sonido Love  MRN: [id number]  : 1945  Room: [address] [place] [address]    Referring Provider: Javed Cheema MD    Evaluating OT: Jessica Guo OTR/L [id number]    AM-PAC Daily Activity Raw Score: 15    Recommended Adaptive Equipment: TBD    Diagnosis: acute respiratory failure. Pt presents to ED from home with an \"almost fall\" and generalized weakness. Pertinent Medical History: CHF   Precautions:  Falls, droplet plus isolation COVID, high flow O2     Home Living: Pt lives with his partner in a 2 story home with B/B 2nd floor. Bathroom setup: walk in shower, standard commode     Prior Level of Function: Per pt report, Mod I with ADL/IADLs; completed functional mobility with no AD. No home O2    Pain Level: no reported pain    Cognition: A&O: . Pt is oriented to self and hospital. Disoriented to temporal concepts and situation.     Problem solving:  poor   Judgement/safety:  poor     Functional Assessment:   Initial Eval Status  Date: 21 Treatment session:  Short Term Goals     Feeding Set up     Grooming Min A  Set up   UB Dressing Min A  Donning/doffing hospital gown  Set up   LB Dressing Max A  Management of B socks  Min A    Bathing Max A  Min A   Toileting Mod A  Post toileting care, incontinence present upon standing at EOB  SBA   Bed Mobility  Supine <> sit: Mod A     Functional Transfers STS: Mod A  SBA   Functional Mobility Min A with HHA  Small side steps to HOB  Mild unsteadiness  SBA during ADLs   Balance Sitting: fair  Sitting unsupported tolerance x8-9 min    Standing: fair minus  Standing michael x1-2 min, 2 trials     Activity Tolerance Poor  O2 10L restin%  Seated EOB: 96%  With minimal exertion: 93%  standing michael x6-7 min with fair plus balance during self care tasks             Treatment: Patient educated on techniques for completion of ADL, safe functional transfers and functional mobility. Patient required cues for follow through with proper hand/foot placement, pacing, safety, attention, sequencing and technique in bed mobility, functional transfers, functional mobility and LB dressing in preparation for maximum independence in all self care tasks.      Hand Dominance: Right []  Left []   Strength ROM Additional Info:    RUE  3+/5 WFL fair  and FMC/dexterity noted during ADL tasks     LUE 3+/5 WFL fair  and FMC/dexterity noted during ADL tasks         Hearing: WFL  Vision: WFL   Sensation:  No c/o numbness or tingling   Tone: WFL                            Long Term Goal (1-3 wks): Pt will maximize functional performance in all self care tasks/functional transfers with good follow through of all trained techniques for safe transition to next level of care    Assessment of current deficits   Functional mobility [x]  ADLs [x] Strength [x]  Cognition []  Functional transfers  [x] IADLs [x] Safety Awareness [x]  Endurance [x]  Fine Motor Coordination [] Balance [x] Vision/perception [] Sensation []   Gross Motor Coordination [] ROM [] Delirium []                  Motor Control []    Plan of Care: 1-4 days/week for 1-2 weeks PRN   [x]ADL retraining/adaptive techniques and AE recommendations to increase functional independence within precautions                    [x]Energy conservation techniques to improve tolerance for ADL/IADLs  [x]Functional transfer/mobility training/DME recommendations for increased independence, safety and fall prevention         [x]Patient/family education to increase safety and functional independence during daily routine          [x]Environmental modifications for safe mobility and completion of ADLs                             []Cognitive retraining to improve problem solving skills & safe participation in ADLs/IADLs     []Sensory re-education techniques to improve extremity awareness, maintain skin integrity and improve hand function []Visual/Perceptual retraining to improve body awareness and safety during transfers and ADLs  []Splinting/positioning needs to maintain joint/skin integrity and contracture prevention  [x]Therapeutic activity to improve functional performance during ADLs                                        [x]Therapeutic exercise to improve tolerance and functional strength for ADLs   [x]Balance retraining/tolerance tasks for facilitation of postural control with dynamic challenges during ADLs  []Neuromuscular re-education to facilitate righting/equilibrium reactions, midline orientation, scapular stability/mobility, normalize muscle tone and facilitate active functional movement                        []Delirium prevention/treatment    [x]Positioning to improve functional independence and decrease risk of skin breakdown  []Other:     Rehab Potential: Good for established goals     Patient/Family Goal: To get home. Patient and/or family were instructed on functional diagnosis, prognosis/goals and OT plan of care. Pt verbalized understanding. Upon arrival, patient supine in bed. At end of session, patient supine in bed with call light and phone within reach, all lines and tubes intact. Pt would benefit from continued skilled OT to increase safety and independence with completion of ADL/IADL tasks for functional independence and quality of life.  Bed/chair alarm: ON    Low Evaluation 72126  Time In: 1352   Time Out: [address]       Min Units   Therapeutic Ex 01987     Therapeutic Activities 13090 3    ADL/Self Care 10267 7 1   Orthotic Management 74662     Neuro Re-Ed [id number]     TOTAL TIMED TREATMENT 10 1       Evaluation time includes thorough review of current medical information, gathering information on past medical history/social history and prior level of function, completion of standardized testing/informal observation of tasks, assessment of data, and development of POC/Goals    Rowena Marrero OTR/L  [id number] No

## 2021-01-04 NOTE — PROGRESS NOTES
to short high flow cannula. Cues for hand placement with transfers . Pulse ox with O2@ 10 L at rest 94%, 96% after minimal mobility, recovered to 93%     Pt educated on fall risk,  Proper breathing technique,  safety with mobility        Patient response to education:   Pt verbalized understanding Pt demonstrated skill Pt requires further education in this area   x  with cues   x       Comments:  Pt left  In bed after session, with call light in reach. Rehab potential is Good for reaching above PT goals. Pts/ family goals   1. None stated     Patient and or family understand(s) diagnosis, prognosis, and plan of care. -  Questionable     PLAN  PT care will be provided in accordance with the objectives noted above. Whenever appropriate, clear delegation orders will be provided for nursing staff. Exercises and functional mobility practice will be used as well as appropriate assistive devices or modalities to obtain goals. Patient and family education will also be administered as needed. PLAN OF CARE:    Current Treatment Recommendations     [x] Strengthening     [] ROM   [x] Balance Training   [x] Endurance Training   [x] Transfer Training   [x] Gait Training   [] Stair Training   [x] Positioning   [x] Safety and Education Training   [x] Patient/Caregiver Education   [] HEP  [] Other       Frequency of treatments will be 2-5x/week x 7-14 days. Time in: 1353   Time out:  1413      Evaluation Time includes thorough review of current medical information, gathering information on past medical history/social history and prior level of function, completion of standardized testing/informal observation of tasks, assessment of data and education on plan of care and goals.     CPT codes:  [] Low Complexity PT evaluation 80233  [x] Moderate Complexity PT evaluation 22901  [] High Complexity PT evaluation 43540  [] PT Re-evaluation 45969  [] Gait training 61894  minutes  [] Therapeutic activities  minutes  [] Therapeutic exercises 80204  minutes  [] Neuromuscular reeducation 88029  minutes       Roberto 18 number:  PT 7888

## 2021-01-04 NOTE — PROGRESS NOTES
PROGRESS NOTE       PATIENT PROBLEM LIST:  Active Problems:    DVT (deep venous thrombosis) ([facility])    CHF with unknown LVEF ([facility])    COVID-19    Hypokalemia    Hypomagnesemia    Fever and chills    Hypoxia    Acute hypoxemic respiratory failure due to severe acute respiratory syndrome coronavirus 2 (SARS-CoV-2) disease ([facility])  Resolved Problems:    * No resolved hospital problems. *      SUBJECTIVE:  Marta Patten remains alert but has not been up out of bed up to this time. He voices no complaints presently. His blood pressure is exceedingly high. REVIEW OF SYSTEMS:  General ROS: positive for  - fatigue  Psychological ROS:  negative for - anxiety and depression  Ophthalmic ROS:  negative for - decreased vision  or visual distortion. ENT ROS:  negative for - hearing change  Allergy and Immunology ROS: negative  Hematological and Lymphatic ROS: positive for - anemia  Endocrine ROS: negative  Respiratory ROS: positive for - shortness of breath and cough  Cardiovascular ROS: positive for - dyspnea on exertion, irregular heartbeat and shortness of breath  Gastrointestinal ROS: no abdominal pain, change in bowel habits, or black or bloody stools  Genito-Urinary ROS: no dysuria, trouble voiding, or hematuria  Musculoskeletal ROS: negative  Neurological ROS: no TIA or stroke symptoms otherwise no significant change in symptoms or problems since  1/1/2020 as documented in previous progress notes.     SCHEDULED MEDICATIONS:   fondaparinux  7.5 mg Subcutaneous Daily    magnesium oxide  400 mg Oral Daily    bumetanide  0.5 mg Intravenous Daily    metoprolol tartrate  100 mg Oral BID    spironolactone  25 mg Oral Daily    sacubitril-valsartan  1 tablet Oral BID    thiamine  100 mg Oral Daily    multivitamin  1 tablet Oral Daily    pantoprazole  40 mg Oral QAM AC    clotrimazole   Topical BID    heparin flush  3 mL Intravenous 2 times per day    folic acid  1 mg Oral Daily    clopidogrel  75 mg Oral Daily  piperacillin-tazobactam  3.375 g Intravenous Q8H    allopurinol  300 mg Oral Daily    lipase-protease-amylase  36,000 Units Oral TID WC    sodium chloride flush  10 mL Intravenous 2 times per day    dexamethasone  6 mg Oral Daily       VITAL SIGNS:                                                                                                                          BP (!) 180/104   Pulse 96   Temp 101.7 °F (38.7 °C) (Axillary)   Resp 18   Ht 6' (1.829 m)   Wt 194 lb 3.6 oz (88.1 kg)   SpO2 90%   BMI 26.34 kg/m²   Patient Vitals for the past 96 hrs (Last 3 readings):   Weight   01/02/21 0101 194 lb 3.6 oz (88.1 kg)     OBJECTIVE:  General appearance: Well preserved, mesomorphic body habitus, alert, no distress. Skin: Skin color, texture, turgor normal. No rashes or lesions. No induration or tightening palpated. Head: Normocephalic. No masses, lesions, tenderness or abnormalities  Eyes: Conjunctivae/corneas clear. PERRL, EOMs intact. Sclera non icteric. Ears: External ears normal. Canals clear. TM's clear bilaterally. Hearing normal to finger rub. Nose/Sinuses: Nares normal. Septum midline. Mucosa normal. No drainage or sinus tenderness. Oropharynx: Lips, mucosa, and tongue normal. Oropharynx clear with no exudate seen. Neck: Neck supple and symmetric. No adenopathy. Thyroid symmetric, normal size, without nodules. Trachea is midline. Carotids brisk in upstroke without bruits, no abnormal JVP noted at 45°. Chest: Even excursion  Lungs: Lungs grossly clear to auscultation bilaterally. No retractions or use of accessory muscles. No tactile vocal fremitus. No rhonchi, crackles or rales. Heart:  S1 > S2. Regular, irregular rhythm. Ectopics noted. No gallop or murmur. No rub, palpable thrill or heave noted. PMI 5th intercostal space midclavicular line. Abdomen: Abdomen soft, moderately protuberant, non-tender. BS normal. No masses, organomegaly. No hernia noted.   Extremities: Extremities normal. No deformities, edema, or skin discoloration. No cyanosis or clubbing noted to the nails. Peripheral pulses present 2+ upper extremities and present 1+  lower extremities. Musculoskeletal: Spine ROM normal. Muscular strength intact. Neuro: Cranial nerves intact. Motor: Strength 5/5 in all extremities. Reflexes 2+ in all extremities. No focal weakness. Sensory: grossly normal to touch. Coordination intact. Data:   Scheduled Meds: Reviewed  Continuous Infusions:    sodium chloride 12.5 mL/hr at 01/04/21 0640       Intake/Output Summary (Last 24 hours) at 1/4/2021 1349  Last data filed at 1/3/2021 1438  Gross per 24 hour   Intake --   Output 1800 ml   Net -1800 ml     CBC:   Recent Labs     01/02/21  0625 01/03/21  0300 01/04/21  0300   WBC 5.6 6.5 5.2   HGB 10.4* 10.7* 11.3*   HCT 33.4* 34.1* 36.6*    283 307     BMP:  Recent Labs     01/02/21 0625 01/03/21  0300 01/04/21  0300    141 142   K 3.5 3.2* 3.2*    104 102   CO2 29 27 30*   BUN 36* 27* 23   CREATININE 1.3* 1.1 1.0   LABGLOM 53 >60 >60     ABGs:   Lab Results   Component Value Date    PH 7.440 01/02/2021    PO2 66.5 01/02/2021    PCO2 34.3 01/02/2021     INR:   No results for input(s): INR in the last 72 hours. PRO-BNP:   Lab Results   Component Value Date    PROBNP 22,147 (H) 12/30/2020    PROBNP 2,583 (H) 12/28/2020      TSH:   Lab Results   Component Value Date    TSH 3.900 12/21/2019      Cardiac Injury Profile:   No results for input(s): CKTOTAL, CKMB, TROPONINI in the last 72 hours. Lipid Profile:   Lab Results   Component Value Date    TRIG 107 12/21/2019    HDL 74 12/21/2019    LDLCALC 75 12/21/2019    CHOL 170 12/21/2019      Hemoglobin A1C: No components found for: HGBA1C     RAD:   Xr Ribs Left Include Chest (min 3 Views)    Result Date: 12/28/2020  EXAMINATION: 5 XRAY VIEWS OF THE LEFT RIBS WITH FRONTAL XRAY VIEW OF THE CHEST 12/28/2020 9:17 am COMPARISON: None.  HISTORY: ORDERING SYSTEM PROVIDED HISTORY: Rib TECHNIQUE:   10 mCi of Tc-99m MIBI was injected intravenously at rest and cardiac   SPECT images were performed. In addition 31 mCi of Tc-99m MIBI was   injected intravenously at maximum stress by using Lexiscan stress. Stress SPECT images and gated study were performed.        FINDINGS:   Perfusion images demonstrate no reversible perfusion defect. Wall motion is hypokinetic   The end diastolic volume is 714 ml. The end systolic volume is 77 ml. The estimated ejection fraction is 28 %. Impression   1. No reversible perfusion defect   2. Ejection fraction is 28 %. 3. Wall motion appears to be hypokinetic       EKG: See Report  Echo: 12/21/2019  Summary   Ejection fraction is visually estimated at 40-45%. LV diastolic dysfunction   Trileaflet aortic valve. Normal leaflet mobility. No aortic stenosis. No aortic regurgitation. The left atrium is mildly dilated. Mildly dilated aortic root. Physiologic and/or trace mitral regurgitation is present. No evidence of hemodynamically significant mitral stenosis. No evidence for hemodynamically significant pericardial effusion. Normal right ventricle structure and function. Normal tricuspid valve structure and function. IMPRESSIONS:  Active Problems:    DVT (deep venous thrombosis) ([facility])    CHF with unknown LVEF ([facility])    COVID-19    Hypokalemia    Hypomagnesemia    Fever and chills    Hypoxia    Acute hypoxemic respiratory failure due to severe acute respiratory syndrome coronavirus 2 (SARS-CoV-2) disease ([facility])  Resolved Problems:    * No resolved hospital problems. *      RECOMMENDATIONS:  Mr. Dominick Martínez seems relatively stable presently. Continue present medications and carefu;;y monitor renal status. Titrate nephrolysin inhibitor.   I have spent more than 25 minutes face to face with Denise Lema and reviewing notes and laboratory data, with greater than 50% of this time instructing and counseling the patient face to face

## 2021-01-04 NOTE — PROGRESS NOTES
[facility]   Progress Note    Admitting Date and Time: 12/28/2020  8:04 AM  Admit Dx: Acute hypoxemic respiratory failure due to severe acute respiratory syndrome coronavirus 2 (SARS-CoV-2) disease ([facility]) [U07.1, J96.01]    Subjective: Admitted on 28th, patient with known systolic heart failure, chronic leg DVT, alcohol use, presented with weakness, fatigue, cough, shortness of breath, poor appetite, change in taste. COVID-19 positive. Also electrolyte imbalance. Seen by cardiology, remains on low-dose neprilysin inhibitors. Patient with respiratory distress and hypoxemia on 29th evening. Observed in ICU. Now is transferred out. Patient was admitted with Acute hypoxemic respiratory failure due to severe acute respiratory syndrome coronavirus 2 (SARS-CoV-2) disease ([facility]) [U07.1, J96.01]Patient noted  sitting comfortably, on nasal cannula 10 L, maintaining saturations well. Does communicate well. Per RN: Slept most of the day, BP control has improved. ROS: denies fever, chills, cp, sob, n/v, HA unless stated above.      fondaparinux  7.5 mg Subcutaneous Daily    magnesium oxide  400 mg Oral Daily    sacubitril-valsartan  1 tablet Oral BID    bumetanide  0.5 mg Intravenous Daily    metoprolol tartrate  100 mg Oral BID    spironolactone  25 mg Oral Daily    thiamine  100 mg Oral Daily    multivitamin  1 tablet Oral Daily    pantoprazole  40 mg Oral QAM AC    clotrimazole   Topical BID    heparin flush  3 mL Intravenous 2 times per day    folic acid  1 mg Oral Daily    clopidogrel  75 mg Oral Daily    piperacillin-tazobactam  3.375 g Intravenous Q8H    allopurinol  300 mg Oral Daily    lipase-protease-amylase  36,000 Units Oral TID WC    sodium chloride flush  10 mL Intravenous 2 times per day    dexamethasone  6 mg Oral Daily         hydrALAZINE, 20 mg, Q4H PRN      potassium chloride, 20 mEq, PRN      sodium chloride flush, 10 mL, PRN      heparin flush, 3 mL, PRN      LORazepam, 1 mg, Q1H PRN    Or      LORazepam, 1 mg, Q1H PRN    Or      LORazepam, 2 mg, Q1H PRN    Or      LORazepam, 2 mg, Q1H PRN    Or      LORazepam, 3 mg, Q1H PRN    Or      LORazepam, 3 mg, Q1H PRN    Or      LORazepam, 4 mg, Q1H PRN    Or      LORazepam, 4 mg, Q1H PRN      sodium chloride, 30 mL, PRN      HYDROcodone-acetaminophen, 1 tablet, Q6H PRN      loperamide, 2 mg, 4x Daily PRN      promethazine, 12.5 mg, Q6H PRN    Or      ondansetron, 4 mg, Q6H PRN      polyethylene glycol, 17 g, Daily PRN      acetaminophen, 650 mg, Q6H PRN    Or      acetaminophen, 650 mg, Q6H PRN         Objective:    BP (!) 147/74   Pulse 76   Temp 98.1 °F (36.7 °C) (Oral)   Resp 18   Ht 6' (1.829 m)   Wt 194 lb 3.6 oz (88.1 kg)   SpO2 95%   BMI 26.34 kg/m²   General Appearance: alert and oriented to person, place and time, well-developed and well-nourished, in no acute distress  Skin: warm and dry, no rash or erythema  Head: normocephalic and atraumatic  Eyes: pupils equal, round, and reactive to light, extraocular eye movements intact, conjunctivae normal  ENT: tympanic membrane, external ear and ear canal normal bilaterally, oropharynx clear and moist with normal mucous membranes  Neck: neck supple and non tender without mass, no thyromegaly or thyroid nodules, no cervical lymphadenopathy   Pulmonary/Chest: clear to auscultation bilaterally- no wheezes, rales or rhonchi, normal air movement, no respiratory distress  Cardiovascular: normal rate, normal S1 and S2, no gallops, intact distal pulses and no carotid bruits  Abdomen: soft, non-tender, non-distended, normal bowel sounds, no masses or organomegaly  With patient in Covid isolation, examination in addition to personal encounter also includes a information from talking with the nursing as well as other specialty physicians.     Recent Labs     01/02/21  0625 01/03/21  0300 01/04/21  0300    141 142   K 3.5 3.2* 3.2*    104 102   CO2 29 27 30*   BUN 36* 27* 23   CREATININE 1.3* 1.1 1.0   GLUCOSE 132* 103* 135*   CALCIUM 8.5* 8.7 8.9       Recent Labs     01/02/21  0625 01/03/21  0300 01/04/21  0300   WBC 5.6 6.5 5.2   RBC 3.11* 3.17* 3.36*   HGB 10.4* 10.7* 11.3*   HCT 33.4* 34.1* 36.6*   .4* 107.6* 108.9*   MCH 33.4 33.8 33.6   MCHC 31.1* 31.4* 30.9*   RDW 12.7 12.8 12.8    283 307   MPV 11.8 11.5 11.6     Creatinine 1  Hemoglobin 11.3    Radiology:   XR CHEST PORTABLE   Final Result   1. Significant multifocal bilateral airspace disease more prominent on the   right. 2. Status post placement of a right-sided PICC line. The catheter tip is at   the SVC right atrial junction. 3. Removal of the NG tube. XR CHEST PORTABLE   Final Result   Bilateral pulmonary infiltrates, with improved aeration of the right upper   lung. XR ABDOMEN FOR NG/OG/NE TUBE PLACEMENT   Final Result   NG tube is in the stomach      XR CHEST PORTABLE   Final Result   Increasing right lung airspace opacities compared to prior study. XR HIP 2-3 VW W PELVIS LEFT   Final Result   1. Negative for fracture or acute osseous abnormality. 2.  Chronic findings, as above      XR RIBS LEFT INCLUDE CHEST (MIN 3 VIEWS)   Final Result   1. There is no appreciable left rib fracture or pneumothorax   2. Multifocal pneumonia more prominent within the right upper lobe. XR CHEST PORTABLE    (Results Pending)       Assessment:    Active Problems:    DVT (deep venous thrombosis) ([facility])    CHF with unknown LVEF ([facility])    COVID-19    Hypokalemia    Hypomagnesemia    Fever and chills    Hypoxia    Acute hypoxemic respiratory failure due to severe acute respiratory syndrome coronavirus 2 (SARS-CoV-2) disease ([facility])  Resolved Problems:    * No resolved hospital problems. *      Plan:  1. Acute hypoxic respiratory failure, steady improvement, at this time remains on 5 L of supplemental oxygen with nasal cannula.   2. More due to Covid pneumonia, patient remains on Decadron, folic acid, remdesivir -did receive his fourth dose today, apixaban. 3. Chronic alcoholism, patient is on thiamine, folic acid. Still on CIWA protocol. 4. Concern over possible aspiration pneumonia, remains on broad-spectrum coverage with Zosyn, day 5. No change, chest x-ray as of today showing stable multifocal pneumonia, pulmonary following. 5. Hypertension, not well controlled, in part due to alcohol withdrawal, also patient not getting his Aldactone, ESTUARDO has improved, patient is getting his metoprolol as well as Entresto. Improving control  6. Known prior left lower extremity DVT, on anticoagulation with extract, also patient on Plavix  7. Electrolyte imbalance, improved.         Electronically signed by Surinder Reilly MD on 1/4/2021 at 4:42 PM

## 2021-01-05 ENCOUNTER — APPOINTMENT (OUTPATIENT)
Dept: GENERAL RADIOLOGY | Age: 76
DRG: 871 | End: 2021-01-05
Payer: MEDICARE

## 2021-01-05 LAB
ANION GAP SERPL CALCULATED.3IONS-SCNC: 7 MMOL/L (ref 7–16)
ANISOCYTOSIS: ABNORMAL
BASOPHILS ABSOLUTE: 0 E9/L (ref 0–0.2)
BASOPHILS RELATIVE PERCENT: 0 % (ref 0–2)
BUN BLDV-MCNC: 20 MG/DL (ref 8–23)
CALCIUM SERPL-MCNC: 9.4 MG/DL (ref 8.6–10.2)
CHLORIDE BLD-SCNC: 103 MMOL/L (ref 98–107)
CO2: 32 MMOL/L (ref 22–29)
CREAT SERPL-MCNC: 1 MG/DL (ref 0.7–1.2)
EOSINOPHILS ABSOLUTE: 0 E9/L (ref 0.05–0.5)
EOSINOPHILS RELATIVE PERCENT: 0 % (ref 0–6)
GFR AFRICAN AMERICAN: >60
GFR NON-AFRICAN AMERICAN: >60 ML/MIN/1.73
GLUCOSE BLD-MCNC: 117 MG/DL (ref 74–99)
HCT VFR BLD CALC: 36.7 % (ref 37–54)
HEMOGLOBIN: 11.5 G/DL (ref 12.5–16.5)
LYMPHOCYTES ABSOLUTE: 0.74 E9/L (ref 1.5–4)
LYMPHOCYTES RELATIVE PERCENT: 8.7 % (ref 20–42)
MAGNESIUM: 2 MG/DL (ref 1.6–2.6)
MCH RBC QN AUTO: 33.7 PG (ref 26–35)
MCHC RBC AUTO-ENTMCNC: 31.3 % (ref 32–34.5)
MCV RBC AUTO: 107.6 FL (ref 80–99.9)
MONOCYTES ABSOLUTE: 0.25 E9/L (ref 0.1–0.95)
MONOCYTES RELATIVE PERCENT: 2.6 % (ref 2–12)
NEUTROPHILS ABSOLUTE: 7.3 E9/L (ref 1.8–7.3)
NEUTROPHILS RELATIVE PERCENT: 88.7 % (ref 43–80)
NUCLEATED RED BLOOD CELLS: 0 /100 WBC
PDW BLD-RTO: 12.8 FL (ref 11.5–15)
PHOSPHORUS: 3.3 MG/DL (ref 2.5–4.5)
PLATELET # BLD: 320 E9/L (ref 130–450)
PMV BLD AUTO: 11.5 FL (ref 7–12)
POTASSIUM SERPL-SCNC: 3.4 MMOL/L (ref 3.5–5)
RBC # BLD: 3.41 E12/L (ref 3.8–5.8)
SODIUM BLD-SCNC: 142 MMOL/L (ref 132–146)
WBC # BLD: 8.2 E9/L (ref 4.5–11.5)

## 2021-01-05 PROCEDURE — 2500000003 HC RX 250 WO HCPCS: Performed by: INTERNAL MEDICINE

## 2021-01-05 PROCEDURE — 6360000002 HC RX W HCPCS: Performed by: INTERNAL MEDICINE

## 2021-01-05 PROCEDURE — 84100 ASSAY OF PHOSPHORUS: CPT

## 2021-01-05 PROCEDURE — 2060000000 HC ICU INTERMEDIATE R&B

## 2021-01-05 PROCEDURE — 2580000003 HC RX 258: Performed by: INTERNAL MEDICINE

## 2021-01-05 PROCEDURE — 94660 CPAP INITIATION&MGMT: CPT

## 2021-01-05 PROCEDURE — 6370000000 HC RX 637 (ALT 250 FOR IP): Performed by: INTERNAL MEDICINE

## 2021-01-05 PROCEDURE — 36415 COLL VENOUS BLD VENIPUNCTURE: CPT

## 2021-01-05 PROCEDURE — 85025 COMPLETE CBC W/AUTO DIFF WBC: CPT

## 2021-01-05 PROCEDURE — 71045 X-RAY EXAM CHEST 1 VIEW: CPT

## 2021-01-05 PROCEDURE — 83735 ASSAY OF MAGNESIUM: CPT

## 2021-01-05 PROCEDURE — 80048 BASIC METABOLIC PNL TOTAL CA: CPT

## 2021-01-05 PROCEDURE — 6360000002 HC RX W HCPCS

## 2021-01-05 PROCEDURE — 99233 SBSQ HOSP IP/OBS HIGH 50: CPT | Performed by: INTERNAL MEDICINE

## 2021-01-05 PROCEDURE — 2700000000 HC OXYGEN THERAPY PER DAY

## 2021-01-05 RX ORDER — POTASSIUM CHLORIDE 7.45 MG/ML
INJECTION INTRAVENOUS
Status: COMPLETED
Start: 2021-01-05 | End: 2021-01-05

## 2021-01-05 RX ORDER — AMLODIPINE BESYLATE 5 MG/1
5 TABLET ORAL DAILY
Status: DISCONTINUED | OUTPATIENT
Start: 2021-01-05 | End: 2021-01-07 | Stop reason: HOSPADM

## 2021-01-05 RX ORDER — POTASSIUM CHLORIDE 20 MEQ/1
40 TABLET, EXTENDED RELEASE ORAL ONCE
Status: COMPLETED | OUTPATIENT
Start: 2021-01-05 | End: 2021-01-05

## 2021-01-05 RX ADMIN — SODIUM CHLORIDE: 9 INJECTION, SOLUTION INTRAVENOUS at 23:04

## 2021-01-05 RX ADMIN — MAGNESIUM OXIDE TAB 400 MG (241.3 MG ELEMENTAL MG) 400 MG: 400 (241.3 MG) TAB at 08:32

## 2021-01-05 RX ADMIN — Medication 10 ML: at 21:30

## 2021-01-05 RX ADMIN — METOPROLOL TARTRATE 100 MG: 50 TABLET, FILM COATED ORAL at 08:33

## 2021-01-05 RX ADMIN — MULTIVITAMIN TABLET 1 TABLET: TABLET at 08:32

## 2021-01-05 RX ADMIN — CLOTRIMAZOLE: 10 CREAM TOPICAL at 08:37

## 2021-01-05 RX ADMIN — PANCRELIPASE 36000 UNITS: 60000; 12000; 38000 CAPSULE, DELAYED RELEASE PELLETS ORAL at 16:03

## 2021-01-05 RX ADMIN — PIPERACILLIN AND TAZOBACTAM 3.38 G: 3; .375 INJECTION, POWDER, LYOPHILIZED, FOR SOLUTION INTRAVENOUS at 03:30

## 2021-01-05 RX ADMIN — POTASSIUM CHLORIDE 40 MEQ: 20 TABLET, EXTENDED RELEASE ORAL at 12:21

## 2021-01-05 RX ADMIN — AMLODIPINE BESYLATE 5 MG: 5 TABLET ORAL at 15:13

## 2021-01-05 RX ADMIN — FONDAPARINUX SODIUM 7.5 MG: 7.5 INJECTION, SOLUTION SUBCUTANEOUS at 08:33

## 2021-01-05 RX ADMIN — HYDROCODONE BITARTRATE AND ACETAMINOPHEN 1 TABLET: 7.5; 325 TABLET ORAL at 09:13

## 2021-01-05 RX ADMIN — HYDROCODONE BITARTRATE AND ACETAMINOPHEN 1 TABLET: 7.5; 325 TABLET ORAL at 21:31

## 2021-01-05 RX ADMIN — LOPERAMIDE HYDROCHLORIDE 2 MG: 2 CAPSULE ORAL at 00:35

## 2021-01-05 RX ADMIN — ALLOPURINOL 300 MG: 300 TABLET ORAL at 08:33

## 2021-01-05 RX ADMIN — PANCRELIPASE 36000 UNITS: 60000; 12000; 38000 CAPSULE, DELAYED RELEASE PELLETS ORAL at 12:21

## 2021-01-05 RX ADMIN — CLOPIDOGREL 75 MG: 75 TABLET, FILM COATED ORAL at 08:33

## 2021-01-05 RX ADMIN — POTASSIUM CHLORIDE 10 MEQ: 10 INJECTION, SOLUTION INTRAVENOUS at 04:34

## 2021-01-05 RX ADMIN — POTASSIUM CHLORIDE 10 MEQ: 7.46 INJECTION, SOLUTION INTRAVENOUS at 12:22

## 2021-01-05 RX ADMIN — CLOTRIMAZOLE: 10 CREAM TOPICAL at 21:31

## 2021-01-05 RX ADMIN — SACUBITRIL AND VALSARTAN 1 TABLET: 49; 51 TABLET, FILM COATED ORAL at 21:31

## 2021-01-05 RX ADMIN — PANCRELIPASE 36000 UNITS: 60000; 12000; 38000 CAPSULE, DELAYED RELEASE PELLETS ORAL at 08:31

## 2021-01-05 RX ADMIN — POTASSIUM CHLORIDE: 10 INJECTION, SOLUTION INTRAVENOUS at 07:30

## 2021-01-05 RX ADMIN — PIPERACILLIN AND TAZOBACTAM 3.38 G: 3; .375 INJECTION, POWDER, LYOPHILIZED, FOR SOLUTION INTRAVENOUS at 18:48

## 2021-01-05 RX ADMIN — SODIUM CHLORIDE 12.5 ML/HR: 9 INJECTION, SOLUTION INTRAVENOUS at 07:50

## 2021-01-05 RX ADMIN — Medication 100 MG: at 08:33

## 2021-01-05 RX ADMIN — SODIUM CHLORIDE: 9 INJECTION, SOLUTION INTRAVENOUS at 15:30

## 2021-01-05 RX ADMIN — FOLIC ACID 1 MG: 1 TABLET ORAL at 08:32

## 2021-01-05 RX ADMIN — PIPERACILLIN AND TAZOBACTAM 3.38 G: 3; .375 INJECTION, POWDER, LYOPHILIZED, FOR SOLUTION INTRAVENOUS at 12:26

## 2021-01-05 RX ADMIN — POTASSIUM CHLORIDE: 10 INJECTION, SOLUTION INTRAVENOUS at 05:41

## 2021-01-05 RX ADMIN — DEXAMETHASONE 6 MG: 4 TABLET ORAL at 08:32

## 2021-01-05 RX ADMIN — PANTOPRAZOLE SODIUM 40 MG: 40 TABLET, DELAYED RELEASE ORAL at 05:18

## 2021-01-05 RX ADMIN — METOPROLOL TARTRATE 100 MG: 50 TABLET, FILM COATED ORAL at 21:31

## 2021-01-05 RX ADMIN — SACUBITRIL AND VALSARTAN 1 TABLET: 49; 51 TABLET, FILM COATED ORAL at 08:33

## 2021-01-05 RX ADMIN — SPIRONOLACTONE 25 MG: 25 TABLET ORAL at 08:33

## 2021-01-05 RX ADMIN — HYDROCODONE BITARTRATE AND ACETAMINOPHEN 1 TABLET: 7.5; 325 TABLET ORAL at 15:13

## 2021-01-05 RX ADMIN — BUMETANIDE 0.5 MG: 0.25 INJECTION INTRAMUSCULAR; INTRAVENOUS at 08:35

## 2021-01-05 RX ADMIN — SODIUM CHLORIDE, PRESERVATIVE FREE 300 UNITS: 5 INJECTION INTRAVENOUS at 21:31

## 2021-01-05 RX ADMIN — Medication 10 ML: at 08:35

## 2021-01-05 ASSESSMENT — PAIN DESCRIPTION - FREQUENCY
FREQUENCY: CONTINUOUS
FREQUENCY: INTERMITTENT
FREQUENCY: INTERMITTENT

## 2021-01-05 ASSESSMENT — PAIN DESCRIPTION - PAIN TYPE: TYPE: CHRONIC PAIN

## 2021-01-05 ASSESSMENT — PAIN SCALES - GENERAL
PAINLEVEL_OUTOF10: 6
PAINLEVEL_OUTOF10: 3
PAINLEVEL_OUTOF10: 3

## 2021-01-05 ASSESSMENT — PAIN - FUNCTIONAL ASSESSMENT
PAIN_FUNCTIONAL_ASSESSMENT: PREVENTS OR INTERFERES SOME ACTIVE ACTIVITIES AND ADLS
PAIN_FUNCTIONAL_ASSESSMENT: ACTIVITIES ARE NOT PREVENTED
PAIN_FUNCTIONAL_ASSESSMENT: ACTIVITIES ARE NOT PREVENTED

## 2021-01-05 ASSESSMENT — PAIN DESCRIPTION - LOCATION: LOCATION: GENERALIZED

## 2021-01-05 ASSESSMENT — PAIN DESCRIPTION - ONSET
ONSET: ON-GOING
ONSET: ON-GOING

## 2021-01-05 ASSESSMENT — PAIN SCALES - WONG BAKER: WONGBAKER_NUMERICALRESPONSE: 0

## 2021-01-05 ASSESSMENT — PAIN DESCRIPTION - DESCRIPTORS: DESCRIPTORS: ACHING;CONSTANT;DISCOMFORT

## 2021-01-05 NOTE — CARE COORDINATION
1/5/2021  Social Work Discharge Planning:COVID POS. [facility] 15/24. GENEVA confirmed with Sherman Bennett with [facility] that she continues to follow Pt. Pt is currently on 10 high flow o2. [address] [place] [address] DME is following for any o2 needs-if Pt doesn't end up going to [facility]. Electronically signed by MANDIE Daniel on 1/5/2021 at 11:04 AM

## 2021-01-05 NOTE — PROGRESS NOTES
Comprehensive Nutrition Assessment    Type and Reason for Visit:  Reassess    Nutrition Recommendations/Plan: Continue Diet. Will Start Ensure High Phu ONS BID. Nutrition Assessment:  Pt slowly improving from a nutritional stand-point AEB pt consuming ~50-75% of most meals (per d/w RN via phone) w/ no noted complaints at this time per EMR review. Pt remains at risk however d/t poor intake x ~8 days since adm d/t poor appetite w/ SOB and continued BiPAP use 2/2 COVID19+. Will Start ONS to aid in PO intake. Malnutrition Assessment:  Malnutrition Status: At risk for malnutrition (Comment)    Context:  Acute Illness     Findings of the 6 clinical characteristics of malnutrition:  Energy Intake:  Mild decrease in energy intake (Comment)(NPO now)  Weight Loss:  No significant weight loss     Body Fat Loss:  Unable to assess(Covid Isolation)     Muscle Mass Loss:  Unable to assess(Covid isolation)    Fluid Accumulation:  No significant fluid accumulation     Strength:  Not Performed    Estimated Daily Nutrient Needs:  Energy (kcal):  3744-4164(MSJ x 1.2 SF per CBW); Weight Used for Energy Requirements:  Current     Protein (g):  105-120(1.3-1.5 gm/kg per IBW); Weight Used for Protein Requirements:  Ideal        Fluid (ml/day):  7369-5711; Method Used for Fluid Requirements:  1 ml/kcal      Nutrition Related Findings:  A&O, dentition WNL, Abd/BS WDL, +1 BLE edema, -I/O's(RRT for resp distress w/ trans to ICU 12/29)      Wounds:  None       Current Nutrition Therapies:    DIET CARDIAC; Anthropometric Measures:  · Height: 6' (182.9 cm)  · Current Body Weight: 194 lb (88 kg)(bed 1/2)   · Admission Body Weight: 199 lb (90.3 kg)(12/30)    · Usual Body Weight: 194 lb (88 kg)(per EMR x 1 yr inpt admit)     · Ideal Body Weight: 178 lbs; % Ideal Body Weight 108.4 %   · BMI: 26.3  · Adjusted Body Weight:  ; No Adjustment   · Adjusted BMI:      · BMI Categories: Overweight (BMI 25.0-29. 9)       Nutrition Diagnosis:   · Inadequate oral intake related to impaired respiratory function(2/2 COVID19+) as evidenced by intake 51-75%, poor intake prior to admission      Nutrition Interventions:   Food and/or Nutrient Delivery:  Continue Current Diet, Start Oral Nutrition Supplement(Continue Diet. Will Start Ensure High Phu ONS BID.)  Nutrition Education/Counseling:  No recommendation at this time   Coordination of Nutrition Care:  Continue to monitor while inpatient    Goals:  PO intake >75% of meals/ONS.        Nutrition Monitoring and Evaluation:   Behavioral-Environmental Outcomes:  None Identified   Food/Nutrient Intake Outcomes:  Diet Advancement/Tolerance, Food and Nutrient Intake, Supplement Intake  Physical Signs/Symptoms Outcomes:  Biochemical Data, GI Status, Fluid Status or Edema, Nutrition Focused Physical Findings, Skin, Weight     Discharge Planning:    Continue current diet, Continue Oral Nutrition Supplement     Electronically signed by Maranda Ramos RD, LD on 1/5/21 at 3:17 PM EST    Contact: ext [phone]

## 2021-01-05 NOTE — CARE COORDINATION
Updated discharge plan of care. Called and left VM with Rue Head, domestic partner, about discharge planning. Pending call back. Pt remains on 10 hf o2. Will attempt to wean and continue to follow for discharge needs-O     Updated discharge plan of care. Spoke with manolo. We discussed possible [facility] choices. 1st choice is [facility]-called left vm, 2 nd choice is [facility].  Will follow-O     Referral given to [facility]- they are able to take up to [id number]

## 2021-01-05 NOTE — PROGRESS NOTES
[facility]   Progress Note    Admitting Date and Time: 12/28/2020  8:04 AM  Admit Dx: Acute hypoxemic respiratory failure due to severe acute respiratory syndrome coronavirus 2 (SARS-CoV-2) disease ([facility]) [U07.1, J96.01]    Subjective: Admitted on 28th, patient with known systolic heart failure, chronic leg DVT, alcohol use, presented with weakness, fatigue, cough, shortness of breath, poor appetite, change in taste. COVID-19 positive. Also electrolyte imbalance. Seen by cardiology, remains on low-dose neprilysin inhibitors. Patient with respiratory distress and hypoxemia on 29th evening. Observed in ICU. Now is transferred out. Zosyn will be stopped tomorrow patient is being evaluated for Duffy Furrow. Patient was admitted with Acute hypoxemic respiratory failure due to severe acute respiratory syndrome coronavirus 2 (SARS-CoV-2) disease ([facility]) [U07.1, J96.01]Patient noted  sitting comfortably, on nasal cannula 10 L, maintaining saturations well. Tired of being in the hospital.  Does communicate well. Per RN: Slept most of the day, BP control has improved. ROS: denies fever, chills, cp, sob, n/v, HA unless stated above.      potassium chloride        fondaparinux  7.5 mg Subcutaneous Daily    magnesium oxide  400 mg Oral Daily    sacubitril-valsartan  1 tablet Oral BID    bumetanide  0.5 mg Intravenous Daily    metoprolol tartrate  100 mg Oral BID    spironolactone  25 mg Oral Daily    thiamine  100 mg Oral Daily    multivitamin  1 tablet Oral Daily    pantoprazole  40 mg Oral QAM AC    clotrimazole   Topical BID    heparin flush  3 mL Intravenous 2 times per day    folic acid  1 mg Oral Daily    clopidogrel  75 mg Oral Daily    piperacillin-tazobactam  3.375 g Intravenous Q8H    allopurinol  300 mg Oral Daily    lipase-protease-amylase  36,000 Units Oral TID WC    sodium chloride flush  10 mL Intravenous 2 times per day    dexamethasone  6 mg Oral Daily   hydrALAZINE, 20 mg, Q4H PRN      potassium chloride, 20 mEq, PRN      sodium chloride flush, 10 mL, PRN      heparin flush, 3 mL, PRN      LORazepam, 1 mg, Q1H PRN    Or      LORazepam, 1 mg, Q1H PRN    Or      LORazepam, 2 mg, Q1H PRN    Or      LORazepam, 2 mg, Q1H PRN    Or      LORazepam, 3 mg, Q1H PRN    Or      LORazepam, 3 mg, Q1H PRN    Or      LORazepam, 4 mg, Q1H PRN    Or      LORazepam, 4 mg, Q1H PRN      sodium chloride, 30 mL, PRN      HYDROcodone-acetaminophen, 1 tablet, Q6H PRN      loperamide, 2 mg, 4x Daily PRN      promethazine, 12.5 mg, Q6H PRN    Or      ondansetron, 4 mg, Q6H PRN      polyethylene glycol, 17 g, Daily PRN      acetaminophen, 650 mg, Q6H PRN    Or      acetaminophen, 650 mg, Q6H PRN         Objective:    BP (!) 176/89   Pulse 84   Temp 98.2 °F (36.8 °C) (Oral)   Resp 18   Ht 6' (1.829 m)   Wt 194 lb 3.6 oz (88.1 kg)   SpO2 92%   BMI 26.34 kg/m²   General Appearance: alert and oriented to person, place and time, well-developed and well-nourished, in no acute distress  Skin: warm and dry, no rash or erythema  Head: normocephalic and atraumatic  Eyes: pupils equal, round, and reactive to light, extraocular eye movements intact, conjunctivae normal  ENT: tympanic membrane, external ear and ear canal normal bilaterally, oropharynx clear and moist with normal mucous membranes  Neck: neck supple and non tender without mass, no thyromegaly or thyroid nodules, no cervical lymphadenopathy   Pulmonary/Chest: clear to auscultation bilaterally- no wheezes, rales or rhonchi, normal air movement, no respiratory distress  Cardiovascular: normal rate, normal S1 and S2, no gallops, intact distal pulses and no carotid bruits  Abdomen: soft, non-tender, non-distended, normal bowel sounds, no masses or organomegaly  With patient in Covid isolation, examination in addition to personal encounter also includes a information from talking with the nursing as well as

## 2021-01-05 NOTE — PROGRESS NOTES
times per day    dexamethasone  6 mg Oral Daily       Physical Exam:  Due to the current efforts to prevent transmission of COVID-19 and also the need to preserve PPE for other caregivers, a face-to-face encounter with the patient was not performed. That being said, all relevant records and diagnostic tests were reviewed, including laboratory results and imaging. Please reference any relevant documentation elsewhere. Care will be coordinated with the primary service. Pertinent/ New Labs and Imaging Studies     Imaging Personally Reviewed:  1/3/2021  1. Significant multifocal bilateral airspace disease more prominent on the   right. 2. Status post placement of a right-sided PICC line.  The catheter tip is at   the SVC right atrial junction. 3. Removal of the NG tube. ECHO  1/1/2021  Summary   Left ventricle grossly normal in size. Mild left ventricular concentric hypertrophy noted. Global hypokinesis is noted to be severe. Estimated left ventricular ejection fraction is 28±5%. <50% criteria for diastolic dysfunction. The LAESV Index is <34ml/m2. Normal right ventricular size. TAPSE is decreased   Physiologic and/or trace mitral regurgitation is present. Trace aortic regurgitation is noted. Physiologic and/or trace tricuspid regurgitation. RVSP is 41 mmHg. Technically good quality study. Technically difficult study due to patient''s body habitus. Suggest clinical correlation.       Labs:  Lab Results   Component Value Date    WBC 8.2 01/05/2021    HGB 11.5 01/05/2021    HCT 36.7 01/05/2021    .6 01/05/2021    MCH 33.7 01/05/2021    MCHC 31.3 01/05/2021    RDW 12.8 01/05/2021     01/05/2021    MPV 11.5 01/05/2021     Lab Results   Component Value Date     01/05/2021    K 3.4 01/05/2021    K 3.5 12/29/2020     01/05/2021    CO2 32 01/05/2021    BUN 20 01/05/2021    CREATININE 1.0 01/05/2021    LABALBU 2.7 01/02/2021    CALCIUM 9.4 01/05/2021    GFRAA

## 2021-01-05 NOTE — PLAN OF CARE
Problem: Inadequate oral food/beverage intake (NI-2.1)  Goal: Food and/or Nutrient Delivery  Continue Diet. Will Start Ensure High Phu ONS BID. Description: Individualized approach for food/nutrient provision.   Outcome: Met This Shift

## 2021-01-06 LAB
ANION GAP SERPL CALCULATED.3IONS-SCNC: 6 MMOL/L (ref 7–16)
BASOPHILS ABSOLUTE: 0.01 E9/L (ref 0–0.2)
BASOPHILS RELATIVE PERCENT: 0.2 % (ref 0–2)
BUN BLDV-MCNC: 20 MG/DL (ref 8–23)
CALCIUM SERPL-MCNC: 9.4 MG/DL (ref 8.6–10.2)
CHLORIDE BLD-SCNC: 102 MMOL/L (ref 98–107)
CO2: 31 MMOL/L (ref 22–29)
CREAT SERPL-MCNC: 1 MG/DL (ref 0.7–1.2)
EOSINOPHILS ABSOLUTE: 0.09 E9/L (ref 0.05–0.5)
EOSINOPHILS RELATIVE PERCENT: 1.4 % (ref 0–6)
GFR AFRICAN AMERICAN: >60
GFR NON-AFRICAN AMERICAN: >60 ML/MIN/1.73
GLUCOSE BLD-MCNC: 116 MG/DL (ref 74–99)
HCT VFR BLD CALC: 33.9 % (ref 37–54)
HEMOGLOBIN: 11 G/DL (ref 12.5–16.5)
IMMATURE GRANULOCYTES #: 0.04 E9/L
IMMATURE GRANULOCYTES %: 0.6 % (ref 0–5)
LYMPHOCYTES ABSOLUTE: 1.09 E9/L (ref 1.5–4)
LYMPHOCYTES RELATIVE PERCENT: 16.5 % (ref 20–42)
Lab: NORMAL
MAGNESIUM: 2 MG/DL (ref 1.6–2.6)
MCH RBC QN AUTO: 34.8 PG (ref 26–35)
MCHC RBC AUTO-ENTMCNC: 32.4 % (ref 32–34.5)
MCV RBC AUTO: 107.3 FL (ref 80–99.9)
MONOCYTES ABSOLUTE: 0.47 E9/L (ref 0.1–0.95)
MONOCYTES RELATIVE PERCENT: 7.1 % (ref 2–12)
NEUTROPHILS ABSOLUTE: 4.89 E9/L (ref 1.8–7.3)
NEUTROPHILS RELATIVE PERCENT: 74.2 % (ref 43–80)
PDW BLD-RTO: 12.9 FL (ref 11.5–15)
PHOSPHORUS: 3.8 MG/DL (ref 2.5–4.5)
PLATELET # BLD: 324 E9/L (ref 130–450)
PMV BLD AUTO: 11.7 FL (ref 7–12)
POTASSIUM SERPL-SCNC: 3.9 MMOL/L (ref 3.5–5)
RBC # BLD: 3.16 E12/L (ref 3.8–5.8)
REPORT: NORMAL
SODIUM BLD-SCNC: 139 MMOL/L (ref 132–146)
THIS TEST SENT TO: NORMAL
WBC # BLD: 6.6 E9/L (ref 4.5–11.5)

## 2021-01-06 PROCEDURE — 99232 SBSQ HOSP IP/OBS MODERATE 35: CPT | Performed by: INTERNAL MEDICINE

## 2021-01-06 PROCEDURE — 2580000003 HC RX 258: Performed by: INTERNAL MEDICINE

## 2021-01-06 PROCEDURE — 6370000000 HC RX 637 (ALT 250 FOR IP): Performed by: INTERNAL MEDICINE

## 2021-01-06 PROCEDURE — 6360000002 HC RX W HCPCS: Performed by: INTERNAL MEDICINE

## 2021-01-06 PROCEDURE — 80048 BASIC METABOLIC PNL TOTAL CA: CPT

## 2021-01-06 PROCEDURE — 84100 ASSAY OF PHOSPHORUS: CPT

## 2021-01-06 PROCEDURE — 94660 CPAP INITIATION&MGMT: CPT

## 2021-01-06 PROCEDURE — 83735 ASSAY OF MAGNESIUM: CPT

## 2021-01-06 PROCEDURE — 2060000000 HC ICU INTERMEDIATE R&B

## 2021-01-06 PROCEDURE — 2700000000 HC OXYGEN THERAPY PER DAY

## 2021-01-06 PROCEDURE — 85025 COMPLETE CBC W/AUTO DIFF WBC: CPT

## 2021-01-06 PROCEDURE — 2500000003 HC RX 250 WO HCPCS: Performed by: INTERNAL MEDICINE

## 2021-01-06 RX ORDER — CARVEDILOL 6.25 MG/1
12.5 TABLET ORAL 2 TIMES DAILY WITH MEALS
Status: DISCONTINUED | OUTPATIENT
Start: 2021-01-07 | End: 2021-01-07 | Stop reason: HOSPADM

## 2021-01-06 RX ADMIN — LOPERAMIDE HYDROCHLORIDE 2 MG: 2 CAPSULE ORAL at 02:41

## 2021-01-06 RX ADMIN — DEXAMETHASONE 6 MG: 4 TABLET ORAL at 08:40

## 2021-01-06 RX ADMIN — Medication 10 ML: at 08:41

## 2021-01-06 RX ADMIN — SODIUM CHLORIDE: 9 INJECTION, SOLUTION INTRAVENOUS at 08:38

## 2021-01-06 RX ADMIN — HYDROCODONE BITARTRATE AND ACETAMINOPHEN 1 TABLET: 7.5; 325 TABLET ORAL at 18:04

## 2021-01-06 RX ADMIN — FONDAPARINUX SODIUM 7.5 MG: 7.5 INJECTION, SOLUTION SUBCUTANEOUS at 08:38

## 2021-01-06 RX ADMIN — SACUBITRIL AND VALSARTAN 1 TABLET: 49; 51 TABLET, FILM COATED ORAL at 21:19

## 2021-01-06 RX ADMIN — PANCRELIPASE 36000 UNITS: 60000; 12000; 38000 CAPSULE, DELAYED RELEASE PELLETS ORAL at 08:40

## 2021-01-06 RX ADMIN — FOLIC ACID 1 MG: 1 TABLET ORAL at 08:41

## 2021-01-06 RX ADMIN — SODIUM CHLORIDE, PRESERVATIVE FREE 300 UNITS: 5 INJECTION INTRAVENOUS at 08:40

## 2021-01-06 RX ADMIN — MULTIVITAMIN TABLET 1 TABLET: TABLET at 08:41

## 2021-01-06 RX ADMIN — HYDROCODONE BITARTRATE AND ACETAMINOPHEN 1 TABLET: 7.5; 325 TABLET ORAL at 03:57

## 2021-01-06 RX ADMIN — PANCRELIPASE 36000 UNITS: 60000; 12000; 38000 CAPSULE, DELAYED RELEASE PELLETS ORAL at 16:05

## 2021-01-06 RX ADMIN — PANTOPRAZOLE SODIUM 40 MG: 40 TABLET, DELAYED RELEASE ORAL at 06:10

## 2021-01-06 RX ADMIN — HYDROCODONE BITARTRATE AND ACETAMINOPHEN 1 TABLET: 7.5; 325 TABLET ORAL at 12:04

## 2021-01-06 RX ADMIN — CLOTRIMAZOLE: 10 CREAM TOPICAL at 21:20

## 2021-01-06 RX ADMIN — SPIRONOLACTONE 25 MG: 25 TABLET ORAL at 08:41

## 2021-01-06 RX ADMIN — CLOPIDOGREL 75 MG: 75 TABLET, FILM COATED ORAL at 08:41

## 2021-01-06 RX ADMIN — AMLODIPINE BESYLATE 5 MG: 5 TABLET ORAL at 08:41

## 2021-01-06 RX ADMIN — METOPROLOL TARTRATE 100 MG: 50 TABLET, FILM COATED ORAL at 08:41

## 2021-01-06 RX ADMIN — Medication 100 MG: at 08:41

## 2021-01-06 RX ADMIN — SACUBITRIL AND VALSARTAN 1 TABLET: 49; 51 TABLET, FILM COATED ORAL at 08:41

## 2021-01-06 RX ADMIN — PIPERACILLIN AND TAZOBACTAM 3.38 G: 3; .375 INJECTION, POWDER, LYOPHILIZED, FOR SOLUTION INTRAVENOUS at 03:57

## 2021-01-06 RX ADMIN — MAGNESIUM OXIDE TAB 400 MG (241.3 MG ELEMENTAL MG) 400 MG: 400 (241.3 MG) TAB at 08:40

## 2021-01-06 RX ADMIN — METOPROLOL TARTRATE 100 MG: 50 TABLET, FILM COATED ORAL at 21:20

## 2021-01-06 RX ADMIN — BUMETANIDE 0.5 MG: 0.25 INJECTION INTRAMUSCULAR; INTRAVENOUS at 08:38

## 2021-01-06 RX ADMIN — PANCRELIPASE 36000 UNITS: 60000; 12000; 38000 CAPSULE, DELAYED RELEASE PELLETS ORAL at 12:04

## 2021-01-06 RX ADMIN — ALLOPURINOL 300 MG: 300 TABLET ORAL at 08:40

## 2021-01-06 RX ADMIN — Medication 10 ML: at 21:20

## 2021-01-06 RX ADMIN — CLOTRIMAZOLE: 10 CREAM TOPICAL at 08:39

## 2021-01-06 RX ADMIN — SODIUM CHLORIDE, PRESERVATIVE FREE 300 UNITS: 5 INJECTION INTRAVENOUS at 21:20

## 2021-01-06 ASSESSMENT — PAIN DESCRIPTION - PROGRESSION
CLINICAL_PROGRESSION: NOT CHANGED
CLINICAL_PROGRESSION: NOT CHANGED

## 2021-01-06 ASSESSMENT — PAIN - FUNCTIONAL ASSESSMENT
PAIN_FUNCTIONAL_ASSESSMENT: ACTIVITIES ARE NOT PREVENTED
PAIN_FUNCTIONAL_ASSESSMENT: PREVENTS OR INTERFERES SOME ACTIVE ACTIVITIES AND ADLS

## 2021-01-06 ASSESSMENT — PAIN DESCRIPTION - PAIN TYPE
TYPE: CHRONIC PAIN
TYPE: CHRONIC PAIN

## 2021-01-06 ASSESSMENT — PAIN DESCRIPTION - ONSET
ONSET: ON-GOING

## 2021-01-06 ASSESSMENT — PAIN DESCRIPTION - ORIENTATION
ORIENTATION: RIGHT;LEFT
ORIENTATION: RIGHT;LEFT

## 2021-01-06 ASSESSMENT — PAIN DESCRIPTION - LOCATION
LOCATION: GENERALIZED

## 2021-01-06 ASSESSMENT — PAIN DESCRIPTION - DESCRIPTORS
DESCRIPTORS: ACHING;CONSTANT

## 2021-01-06 ASSESSMENT — PAIN SCALES - GENERAL
PAINLEVEL_OUTOF10: 6
PAINLEVEL_OUTOF10: 0
PAINLEVEL_OUTOF10: 6

## 2021-01-06 ASSESSMENT — PAIN DESCRIPTION - FREQUENCY: FREQUENCY: CONTINUOUS

## 2021-01-06 NOTE — PROGRESS NOTES
Rickey Mccurdy Hospitalist   Progress Note    Admitting Date and Time: 12/28/2020  8:04 AM  Admit Dx: Acute hypoxemic respiratory failure due to severe acute respiratory syndrome coronavirus 2 (SARS-CoV-2) disease ([facility]) [U07.1, J96.01]    Subjective: Admitted on 28th, patient with known systolic heart failure, chronic leg DVT, alcohol use, presented with weakness, fatigue, cough, shortness of breath, poor appetite, change in taste. COVID-19 positive. Also electrolyte imbalance. Seen by cardiology, remains on low-dose neprilysin inhibitors. Patient with respiratory distress and hypoxemia on 29th evening. Observed in ICU. Now is transferred out. Zosyn will be stopped tomorrow patient is being evaluated for Chidi Berry. With insufficient p.o. intake dietary has added Ensure. Patient was admitted with Acute hypoxemic respiratory failure due to severe acute respiratory syndrome coronavirus 2 (SARS-CoV-2) disease ([facility]) [U07.1, J96.01]Patient noted  sitting comfortably, on nasal cannula 6 L, maintaining saturations well. Tired of being in the hospital.  Does communicate well. Per RN: Still not approved for [facility], case management is working on. ROS: denies fever, chills, cp, sob, n/v, HA unless stated above.      amLODIPine  5 mg Oral Daily    fondaparinux  7.5 mg Subcutaneous Daily    magnesium oxide  400 mg Oral Daily    sacubitril-valsartan  1 tablet Oral BID    bumetanide  0.5 mg Intravenous Daily    metoprolol tartrate  100 mg Oral BID    spironolactone  25 mg Oral Daily    thiamine  100 mg Oral Daily    multivitamin  1 tablet Oral Daily    pantoprazole  40 mg Oral QAM AC    clotrimazole   Topical BID    heparin flush  3 mL Intravenous 2 times per day    folic acid  1 mg Oral Daily    clopidogrel  75 mg Oral Daily    piperacillin-tazobactam  3.375 g Intravenous Q8H    allopurinol  300 mg Oral Daily    lipase-protease-amylase  36,000 Units Oral TID WC    sodium chloride flush  10 mL Intravenous 2 times per day    dexamethasone  6 mg Oral Daily         hydrALAZINE, 20 mg, Q4H PRN      potassium chloride, 20 mEq, PRN      sodium chloride flush, 10 mL, PRN      heparin flush, 3 mL, PRN      LORazepam, 1 mg, Q1H PRN    Or      LORazepam, 1 mg, Q1H PRN    Or      LORazepam, 2 mg, Q1H PRN    Or      LORazepam, 2 mg, Q1H PRN    Or      LORazepam, 3 mg, Q1H PRN    Or      LORazepam, 3 mg, Q1H PRN    Or      LORazepam, 4 mg, Q1H PRN    Or      LORazepam, 4 mg, Q1H PRN      sodium chloride, 30 mL, PRN      HYDROcodone-acetaminophen, 1 tablet, Q6H PRN      loperamide, 2 mg, 4x Daily PRN      promethazine, 12.5 mg, Q6H PRN    Or      ondansetron, 4 mg, Q6H PRN      polyethylene glycol, 17 g, Daily PRN      acetaminophen, 650 mg, Q6H PRN    Or      acetaminophen, 650 mg, Q6H PRN         Objective:    BP (!) 154/76   Pulse 69   Temp 98 °F (36.7 °C) (Oral)   Resp 20   Ht 6' (1.829 m)   Wt 192 lb 8 oz (87.3 kg)   SpO2 95%   BMI 26.11 kg/m²   General Appearance: alert and oriented to person, place and time, well-developed and well-nourished, in no acute distress  Skin: warm and dry, no rash or erythema  Head: normocephalic and atraumatic  Eyes: pupils equal, round, and reactive to light, extraocular eye movements intact, conjunctivae normal  ENT: tympanic membrane, external ear and ear canal normal bilaterally, oropharynx clear and moist with normal mucous membranes  Neck: neck supple and non tender without mass, no thyromegaly or thyroid nodules, no cervical lymphadenopathy   Pulmonary/Chest: clear to auscultation bilaterally- no wheezes, rales or rhonchi, normal air movement, no respiratory distress  Cardiovascular: normal rate, normal S1 and S2, no gallops, intact distal pulses and no carotid bruits  Abdomen: soft, non-tender, non-distended, normal bowel sounds, no masses or organomegaly  With patient in Covid isolation, examination in addition to personal encounter also includes a information from talking with the nursing as well as other specialty physicians. Recent Labs     01/04/21  0300 01/05/21  0330 01/06/21  0356    142 139   K 3.2* 3.4* 3.9    103 102   CO2 30* 32* 31*   BUN 23 20 20   CREATININE 1.0 1.0 1.0   GLUCOSE 135* 117* 116*   CALCIUM 8.9 9.4 9.4       Recent Labs     01/04/21  0300 01/05/21  0330 01/06/21  0356   WBC 5.2 8.2 6.6   RBC 3.36* 3.41* 3.16*   HGB 11.3* 11.5* 11.0*   HCT 36.6* 36.7* 33.9*   .9* 107.6* 107.3*   MCH 33.6 33.7 34.8   MCHC 30.9* 31.3* 32.4   RDW 12.8 12.8 12.9    320 324   MPV 11.6 11.5 11.7     Creatinine 1  Hemoglobin 11.3  K3.4    Radiology:   XR CHEST PORTABLE   Final Result   Worsening bilateral pulmonary infiltrates      XR CHEST PORTABLE   Final Result   1. Significant multifocal bilateral airspace disease more prominent on the   right. 2. Status post placement of a right-sided PICC line. The catheter tip is at   the SVC right atrial junction. 3. Removal of the NG tube. XR CHEST PORTABLE   Final Result   Bilateral pulmonary infiltrates, with improved aeration of the right upper   lung. XR ABDOMEN FOR NG/OG/NE TUBE PLACEMENT   Final Result   NG tube is in the stomach      XR CHEST PORTABLE   Final Result   Increasing right lung airspace opacities compared to prior study. XR HIP 2-3 VW W PELVIS LEFT   Final Result   1. Negative for fracture or acute osseous abnormality. 2.  Chronic findings, as above      XR RIBS LEFT INCLUDE CHEST (MIN 3 VIEWS)   Final Result   1. There is no appreciable left rib fracture or pneumothorax   2. Multifocal pneumonia more prominent within the right upper lobe.       XR CHEST PORTABLE    (Results Pending)       Assessment:    Active Problems:    DVT (deep venous thrombosis) (HCC)    CHF with unknown LVEF (HCC)    COVID-19    Hypokalemia    Hypomagnesemia    Fever and chills    Hypoxia    Acute hypoxemic respiratory failure due to severe acute respiratory syndrome coronavirus 2 (SARS-CoV-2) disease (HCC)  Resolved Problems:    * No resolved hospital problems. *      Plan:  1. Acute hypoxic respiratory failure, steady improvement, at this time remains on 10 L of supplemental oxygen with nasal cannula. Likely will need LTAC placement  2. More due to Covid pneumonia, patient remains on Decadron, folic acid, remdesivir -completed treatment, apixaban. 3. Chronic alcoholism, patient is on thiamine, folic acid. Off CIWA. 4. Concern over possible aspiration pneumonia, remains on broad-spectrum coverage with Zosyn, day 6. No change, chest x-ray as of today showing stable multifocal pneumonia, pulmonary following. 5. Hypertension, not well controlled, in part due to alcohol withdrawal, also patient not getting his Aldactone, ESTUARDO has improved, patient is getting his metoprolol as well as Entresto. Norvasc 5 added. 6. Known prior left lower extremity DVT, on anticoagulation with extract, also patient on Plavix  7. Electrolyte imbalance, improved. 8. DC planning, when ready likely to LTAC-hopefully in a day or so.         Electronically signed by Jana Hastings MD on 1/6/2021 at 8:24 AM

## 2021-01-06 NOTE — PLAN OF CARE
Problem: Falls - Risk of:  Goal: Will remain free from falls  Description: Will remain free from falls  1/6/2021 1137 by Bel Baxter RN  Outcome: Met This Shift     Problem: Falls - Risk of:  Goal: Absence of physical injury  Description: Absence of physical injury  1/6/2021 1137 by Bel Baxter RN  Outcome: Met This Shift     Problem: Pain:  Goal: Pain level will decrease  Description: Pain level will decrease  1/6/2021 1137 by Bel Baxter RN  Outcome: Met This Shift     Problem: Pain:  Goal: Control of acute pain  Description: Control of acute pain  1/6/2021 1137 by Bel Baxter RN  Outcome: Met This Shift     Problem: Pain:  Goal: Control of chronic pain  Description: Control of chronic pain  1/6/2021 1137 by Bel Baxter RN  Outcome: Met This Shift     Problem: Airway Clearance - Ineffective  Goal: Achieve or maintain patent airway  1/6/2021 1137 by Bel Baxter RN  Outcome: Met This Shift     Problem: Gas Exchange - Impaired  Goal: Absence of hypoxia  1/6/2021 1137 by Bel Baxter RN  Outcome: Met This Shift     Problem: Gas Exchange - Impaired  Goal: Promote optimal lung function  Outcome: Met This Shift     Problem: Breathing Pattern - Ineffective  Goal: Ability to achieve and maintain a regular respiratory rate  1/6/2021 1137 by Bel Baxter RN  Outcome: Met This Shift     Problem: Body Temperature -  Risk of, Imbalanced  Goal: Ability to maintain a body temperature within defined limits  1/6/2021 1137 by Bel Baxter RN  Outcome: Met This Shift     Problem: Body Temperature -  Risk of, Imbalanced  Goal: Will regain or maintain usual level of consciousness  Outcome: Met This Shift     Problem:  Body Temperature -  Risk of, Imbalanced  Goal: Complications related to the disease process, condition or treatment will be avoided or minimized  Outcome: Met This Shift     Problem: Isolation Precautions - Risk of Spread of Infection  Goal: Prevent transmission of infection  Outcome: Met This Shift     Problem: Nutrition Deficits  Goal: Optimize nutrtional status  Outcome: Met This Shift     Problem: Risk for Fluid Volume Deficit  Goal: Maintain normal heart rhythm  Outcome: Met This Shift     Problem: Loneliness or Risk for Loneliness  Goal: Demonstrate positive use of time alone when socialization is not possible  Outcome: Met This Shift     Problem: Fatigue  Goal: Verbalize increase energy and improved vitality  Outcome: Met This Shift     Problem: Patient Education: Go to Patient Education Activity  Goal: Patient/Family Education  Outcome: Met This Shift     Problem: Skin Integrity:  Goal: Will show no infection signs and symptoms  Description: Will show no infection signs and symptoms  Outcome: Met This Shift  Goal: Absence of new skin breakdown  Description: Absence of new skin breakdown  Outcome: Met This Shift     Problem: PROTECTIVE PRECAUTIONS  Goal: Isolation precautions  Description: Isolation precautions  Outcome: Met This Shift     Problem: Fluid and Electrolyte Imbalance  Goal: Fluid and electrolyte balance are achieved/maintained  Outcome: Met This Shift     Problem: HH FLUID RETENTION-CHF  Goal: Absence of fluid overload signs and symptoms  Outcome: Met This Shift     Problem: Gas Exchange - Impaired:  Goal: Able to breathe comfortably  Description: Able to breathe comfortably  Outcome: Met This Shift     Problem: HEMODYNAMIC STATUS  Goal: Hemoglobin within specified parameters  Outcome: Met This Shift

## 2021-01-06 NOTE — PROGRESS NOTES
[facility] out - pt tolerated well. DTV 2200 - 0000. Jolie Mares Electronically signed by Bonnie Larson RN on 1/6/2021 at 4:11 PM

## 2021-01-06 NOTE — PROGRESS NOTES
Patient had personal belongings dropped off at the . Found a prescription bottle not labelled with 15 white pills. Verified with pharmacy appears to be 7mg of  Hydrocodone with 325 mg of tylenol. Explained to patient and family member that these pills will be locked in the narcotic drawer until d/c. Explained the risk of overdosing or having issues with taking medications that we are not giving.

## 2021-01-06 NOTE — PROGRESS NOTES
Radha Reinoso M.D.,[facility]  Raiza Castanon D.O., F.RADHAC.O.I., Tere Sullivan M.D. Sharrie Cowden, M.D., Sharri Saavedra M.D. Sloane Sears D.O. Daily Pulmonary Progress Note    Patient:  Tam Jerez 66 y.o. male MRN: [id number]     Date of Service: 1/6/2021      Synopsis     We are following patient for acute hypoxic respiratory failure secondary to COVID-19 pneumonia    \"CC\" shortness of breath    Code status: Full code      Subjective      Patient seen and examined in no apparent distress he is currently on 8 L high flow oxygen with a sat of 95%.       Review of Systems:  Unable to assess due to mentation     Objective   Vitals: BP (!) 152/74   Pulse 72   Temp 98.3 °F (36.8 °C) (Oral)   Resp 20   Ht 6' (1.829 m)   Wt 192 lb 8 oz (87.3 kg)   SpO2 95%   BMI 26.11 kg/m²     I/O:    Intake/Output Summary (Last 24 hours) at 1/6/2021 1036  Last data filed at 1/6/2021 0615  Gross per 24 hour   Intake 436 ml   Output 1075 ml   Net -639 ml       Vent Information  Skin Assessment: Clean, dry, & intact  FiO2 : 40 %  SpO2: 95 %  SpO2/FiO2 ratio: 166.67  Mask Type: Full face mask  Mask Size: Medium       IPAP: 18 cmH20  CPAP/EPAP: 10 cmH2O     CURRENT MEDS :  Scheduled Meds:   amLODIPine  5 mg Oral Daily    fondaparinux  7.5 mg Subcutaneous Daily    magnesium oxide  400 mg Oral Daily    sacubitril-valsartan  1 tablet Oral BID    bumetanide  0.5 mg Intravenous Daily    metoprolol tartrate  100 mg Oral BID    spironolactone  25 mg Oral Daily    thiamine  100 mg Oral Daily    multivitamin  1 tablet Oral Daily    pantoprazole  40 mg Oral QAM AC    clotrimazole   Topical BID    heparin flush  3 mL Intravenous 2 times per day    folic acid  1 mg Oral Daily    clopidogrel  75 mg Oral Daily    piperacillin-tazobactam  3.375 g Intravenous Q8H    allopurinol  300 mg Oral Daily    lipase-protease-amylase  36,000 Units Oral TID     sodium chloride flush  10 mL Intravenous 2 times per day    dexamethasone  6 mg Oral Daily       Physical Exam:  Due to the current efforts to prevent transmission of COVID-19 and also the need to preserve PPE for other caregivers, a face-to-face encounter with the patient was not performed. That being said, all relevant records and diagnostic tests were reviewed, including laboratory results and imaging. Please reference any relevant documentation elsewhere. Care will be coordinated with the primary service. Pertinent/ New Labs and Imaging Studies     Imaging Personally Reviewed:  1/3/2021  1. Significant multifocal bilateral airspace disease more prominent on the   right. 2. Status post placement of a right-sided PICC line.  The catheter tip is at   the SVC right atrial junction. 3. Removal of the NG tube. ECHO  1/1/2021  Summary   Left ventricle grossly normal in size. Mild left ventricular concentric hypertrophy noted. Global hypokinesis is noted to be severe. Estimated left ventricular ejection fraction is 28±5%. <50% criteria for diastolic dysfunction. The LAESV Index is <34ml/m2. Normal right ventricular size. TAPSE is decreased   Physiologic and/or trace mitral regurgitation is present. Trace aortic regurgitation is noted. Physiologic and/or trace tricuspid regurgitation. RVSP is 41 mmHg. Technically good quality study. Technically difficult study due to patient''s body habitus. Suggest clinical correlation.       Labs:  Lab Results   Component Value Date    WBC 6.6 01/06/2021    HGB 11.0 01/06/2021    HCT 33.9 01/06/2021    .3 01/06/2021    MCH 34.8 01/06/2021    MCHC 32.4 01/06/2021    RDW 12.9 01/06/2021     01/06/2021    MPV 11.7 01/06/2021     Lab Results   Component Value Date     01/06/2021    K 3.9 01/06/2021    K 3.5 12/29/2020     01/06/2021    CO2 31 01/06/2021    BUN 20 01/06/2021    CREATININE 1.0 01/06/2021    LABALBU 2.7 01/02/2021    CALCIUM 9.4 01/06/2021    GFRAA >60 01/06/2021    LABGLOM >60 01/06/2021     Lab Results   Component Value Date    PROTIME 12.0 12/28/2020    INR 1.0 12/28/2020     No results for input(s): PROBNP in the last 72 hours. No results for input(s): PROCAL in the last 72 hours. This SmartLink has not been configured with any valid records. Assessment:    1. Acute hypoxic respiratory failure   2. Aspiration pneumonia   3. chf   4. DT   5. Covid 19   6. htn  7. dvt       Plan:   1. Abx for aspiration pna, stop Zosyn today  2. Oxygen as needed,wean as tolerated, keep sats greater than 92%, currently on 8 L high flow oxygen  3. Remdesivir completed   4. bipap overnight and as needed, refused  5. Bronchodilators   6. Cxr 1/3, see above  7. Possible discharge to [facility] of care reviewed in collaboration with Dr. Mckenzie Sousa, APRN-CNP      I personally saw, examined, and cared for the patient. Labs, medications, radiographs reviewed. I agree with history exam and plans detailed in NP note.       Electronically signed by Laxmi Montes DO on 1/6/2021 at 10:24 PM

## 2021-01-06 NOTE — CARE COORDINATION
Updated discharge plan of care. Pt down to 5-7l/nc. Reached out to [employer], for [facility] for possible d/c today. Awaiting call back-emma Awad will have bed at [facility] tomorrow pending discharge and pt not going with bipap, and o2 levels.  Will follow in am-alli

## 2021-01-07 ENCOUNTER — APPOINTMENT (OUTPATIENT)
Dept: GENERAL RADIOLOGY | Age: 76
DRG: 871 | End: 2021-01-07
Payer: MEDICARE

## 2021-01-07 VITALS
DIASTOLIC BLOOD PRESSURE: 81 MMHG | OXYGEN SATURATION: 96 % | RESPIRATION RATE: 18 BRPM | WEIGHT: 192.5 LBS | HEART RATE: 76 BPM | HEIGHT: 72 IN | SYSTOLIC BLOOD PRESSURE: 127 MMHG | TEMPERATURE: 97.3 F | BODY MASS INDEX: 26.07 KG/M2

## 2021-01-07 LAB
ANION GAP SERPL CALCULATED.3IONS-SCNC: 7 MMOL/L (ref 7–16)
BASOPHILS ABSOLUTE: 0 E9/L (ref 0–0.2)
BASOPHILS RELATIVE PERCENT: 0 % (ref 0–2)
BUN BLDV-MCNC: 24 MG/DL (ref 8–23)
CALCIUM SERPL-MCNC: 9.9 MG/DL (ref 8.6–10.2)
CHLORIDE BLD-SCNC: 102 MMOL/L (ref 98–107)
CO2: 33 MMOL/L (ref 22–29)
CREAT SERPL-MCNC: 0.9 MG/DL (ref 0.7–1.2)
EOSINOPHILS ABSOLUTE: 0.07 E9/L (ref 0.05–0.5)
EOSINOPHILS RELATIVE PERCENT: 1.2 % (ref 0–6)
GFR AFRICAN AMERICAN: >60
GFR NON-AFRICAN AMERICAN: >60 ML/MIN/1.73
GLUCOSE BLD-MCNC: 108 MG/DL (ref 74–99)
HCT VFR BLD CALC: 37.3 % (ref 37–54)
HEMOGLOBIN: 11.6 G/DL (ref 12.5–16.5)
IMMATURE GRANULOCYTES #: 0.03 E9/L
IMMATURE GRANULOCYTES %: 0.5 % (ref 0–5)
LYMPHOCYTES ABSOLUTE: 1.32 E9/L (ref 1.5–4)
LYMPHOCYTES RELATIVE PERCENT: 21.9 % (ref 20–42)
MAGNESIUM: 1.9 MG/DL (ref 1.6–2.6)
MCH RBC QN AUTO: 33.6 PG (ref 26–35)
MCHC RBC AUTO-ENTMCNC: 31.1 % (ref 32–34.5)
MCV RBC AUTO: 108.1 FL (ref 80–99.9)
MONOCYTES ABSOLUTE: 0.43 E9/L (ref 0.1–0.95)
MONOCYTES RELATIVE PERCENT: 7.1 % (ref 2–12)
NEUTROPHILS ABSOLUTE: 4.18 E9/L (ref 1.8–7.3)
NEUTROPHILS RELATIVE PERCENT: 69.3 % (ref 43–80)
PDW BLD-RTO: 12.8 FL (ref 11.5–15)
PHOSPHORUS: 4.5 MG/DL (ref 2.5–4.5)
PLATELET # BLD: 366 E9/L (ref 130–450)
PMV BLD AUTO: 11.5 FL (ref 7–12)
POTASSIUM SERPL-SCNC: 4 MMOL/L (ref 3.5–5)
RBC # BLD: 3.45 E12/L (ref 3.8–5.8)
SODIUM BLD-SCNC: 142 MMOL/L (ref 132–146)
WBC # BLD: 6 E9/L (ref 4.5–11.5)

## 2021-01-07 PROCEDURE — 97530 THERAPEUTIC ACTIVITIES: CPT

## 2021-01-07 PROCEDURE — 2500000003 HC RX 250 WO HCPCS: Performed by: INTERNAL MEDICINE

## 2021-01-07 PROCEDURE — 83735 ASSAY OF MAGNESIUM: CPT

## 2021-01-07 PROCEDURE — 6360000002 HC RX W HCPCS: Performed by: INTERNAL MEDICINE

## 2021-01-07 PROCEDURE — 85025 COMPLETE CBC W/AUTO DIFF WBC: CPT

## 2021-01-07 PROCEDURE — 2580000003 HC RX 258: Performed by: INTERNAL MEDICINE

## 2021-01-07 PROCEDURE — 99239 HOSP IP/OBS DSCHRG MGMT >30: CPT | Performed by: INTERNAL MEDICINE

## 2021-01-07 PROCEDURE — 6370000000 HC RX 637 (ALT 250 FOR IP): Performed by: INTERNAL MEDICINE

## 2021-01-07 PROCEDURE — 2700000000 HC OXYGEN THERAPY PER DAY

## 2021-01-07 PROCEDURE — 36415 COLL VENOUS BLD VENIPUNCTURE: CPT

## 2021-01-07 PROCEDURE — 94660 CPAP INITIATION&MGMT: CPT

## 2021-01-07 PROCEDURE — 80048 BASIC METABOLIC PNL TOTAL CA: CPT

## 2021-01-07 PROCEDURE — 84100 ASSAY OF PHOSPHORUS: CPT

## 2021-01-07 PROCEDURE — 97535 SELF CARE MNGMENT TRAINING: CPT

## 2021-01-07 PROCEDURE — 71045 X-RAY EXAM CHEST 1 VIEW: CPT

## 2021-01-07 RX ORDER — SPIRONOLACTONE 25 MG/1
25 TABLET ORAL DAILY
Qty: 30 TABLET | Refills: 0 | Status: ON HOLD | DISCHARGE
Start: 2021-01-07 | End: 2021-08-18 | Stop reason: HOSPADM

## 2021-01-07 RX ORDER — PANTOPRAZOLE SODIUM 40 MG/1
40 TABLET, DELAYED RELEASE ORAL
Qty: 30 TABLET | Refills: 0 | DISCHARGE
Start: 2021-01-08

## 2021-01-07 RX ORDER — CARVEDILOL 12.5 MG/1
12.5 TABLET ORAL 2 TIMES DAILY WITH MEALS
Qty: 60 TABLET | Refills: 0 | DISCHARGE
Start: 2021-01-07

## 2021-01-07 RX ORDER — LANOLIN ALCOHOL/MO/W.PET/CERES
100 CREAM (GRAM) TOPICAL DAILY
Qty: 30 TABLET | Refills: 0 | DISCHARGE
Start: 2021-01-08

## 2021-01-07 RX ORDER — FOLIC ACID 1 MG/1
1 TABLET ORAL DAILY
Qty: 30 TABLET | Refills: 0 | DISCHARGE
Start: 2021-01-08

## 2021-01-07 RX ORDER — MULTIVITAMIN WITH IRON
1 TABLET ORAL DAILY
Qty: 30 TABLET | Refills: 0 | DISCHARGE
Start: 2021-01-08

## 2021-01-07 RX ORDER — CLOPIDOGREL BISULFATE 75 MG/1
75 TABLET ORAL DAILY
Qty: 30 TABLET | Refills: 0 | DISCHARGE
Start: 2021-01-08

## 2021-01-07 RX ORDER — AMLODIPINE BESYLATE 5 MG/1
5 TABLET ORAL DAILY
Qty: 30 TABLET | Refills: 0 | Status: ON HOLD | DISCHARGE
Start: 2021-01-08 | End: 2021-10-01 | Stop reason: HOSPADM

## 2021-01-07 RX ORDER — TORSEMIDE 10 MG/1
10 TABLET ORAL DAILY
Status: DISCONTINUED | OUTPATIENT
Start: 2021-01-07 | End: 2021-01-07 | Stop reason: HOSPADM

## 2021-01-07 RX ORDER — HYDROCODONE BITARTRATE AND ACETAMINOPHEN 7.5; 325 MG/1; MG/1
1 TABLET ORAL EVERY 6 HOURS PRN
Qty: 20 TABLET | Refills: 0 | Status: SHIPPED | OUTPATIENT
Start: 2021-01-07 | End: 2021-01-12

## 2021-01-07 RX ADMIN — DEXAMETHASONE 6 MG: 4 TABLET ORAL at 10:07

## 2021-01-07 RX ADMIN — FOLIC ACID 1 MG: 1 TABLET ORAL at 10:08

## 2021-01-07 RX ADMIN — BUMETANIDE 0.5 MG: 0.25 INJECTION INTRAMUSCULAR; INTRAVENOUS at 10:08

## 2021-01-07 RX ADMIN — AMLODIPINE BESYLATE 5 MG: 5 TABLET ORAL at 10:08

## 2021-01-07 RX ADMIN — SPIRONOLACTONE 25 MG: 25 TABLET ORAL at 10:09

## 2021-01-07 RX ADMIN — SACUBITRIL AND VALSARTAN 1 TABLET: 49; 51 TABLET, FILM COATED ORAL at 10:08

## 2021-01-07 RX ADMIN — CLOPIDOGREL 75 MG: 75 TABLET, FILM COATED ORAL at 10:08

## 2021-01-07 RX ADMIN — HYDROCODONE BITARTRATE AND ACETAMINOPHEN 1 TABLET: 7.5; 325 TABLET ORAL at 10:07

## 2021-01-07 RX ADMIN — ALLOPURINOL 300 MG: 300 TABLET ORAL at 10:08

## 2021-01-07 RX ADMIN — CLOTRIMAZOLE: 10 CREAM TOPICAL at 10:06

## 2021-01-07 RX ADMIN — Medication 100 MG: at 10:07

## 2021-01-07 RX ADMIN — PANCRELIPASE 36000 UNITS: 60000; 12000; 38000 CAPSULE, DELAYED RELEASE PELLETS ORAL at 10:23

## 2021-01-07 RX ADMIN — HYDROCODONE BITARTRATE AND ACETAMINOPHEN 1 TABLET: 7.5; 325 TABLET ORAL at 00:37

## 2021-01-07 RX ADMIN — MAGNESIUM OXIDE TAB 400 MG (241.3 MG ELEMENTAL MG) 400 MG: 400 (241.3 MG) TAB at 10:07

## 2021-01-07 RX ADMIN — PANTOPRAZOLE SODIUM 40 MG: 40 TABLET, DELAYED RELEASE ORAL at 05:20

## 2021-01-07 RX ADMIN — SODIUM CHLORIDE, PRESERVATIVE FREE 300 UNITS: 5 INJECTION INTRAVENOUS at 10:06

## 2021-01-07 RX ADMIN — CARVEDILOL 12.5 MG: 6.25 TABLET, FILM COATED ORAL at 10:08

## 2021-01-07 RX ADMIN — MULTIVITAMIN TABLET 1 TABLET: TABLET at 10:08

## 2021-01-07 RX ADMIN — Medication 10 ML: at 10:05

## 2021-01-07 RX ADMIN — FONDAPARINUX SODIUM 7.5 MG: 7.5 INJECTION, SOLUTION SUBCUTANEOUS at 10:07

## 2021-01-07 ASSESSMENT — PAIN DESCRIPTION - PROGRESSION: CLINICAL_PROGRESSION: NOT CHANGED

## 2021-01-07 ASSESSMENT — PAIN SCALES - GENERAL: PAINLEVEL_OUTOF10: 6

## 2021-01-07 ASSESSMENT — PAIN DESCRIPTION - ONSET: ONSET: ON-GOING

## 2021-01-07 ASSESSMENT — PAIN DESCRIPTION - LOCATION: LOCATION: GENERALIZED

## 2021-01-07 ASSESSMENT — PAIN DESCRIPTION - PAIN TYPE: TYPE: CHRONIC PAIN

## 2021-01-07 NOTE — PROGRESS NOTES
Date: 1/6/2021    Time: 11:44 PM    Patient Placed On BIPAP/CPAP/ Non-Invasive Ventilation? No    If no must comment. Facial area red/color change? No           If YES are Blister/Lesion present? No   If yes must notify nursing staff  BIPAP/CPAP skin barrier?   No    Skin barrier type:n/a       Comments: Patient refusing        Wayne Freshwater

## 2021-01-07 NOTE — PROGRESS NOTES
36,000 Units Oral TID WC    sodium chloride flush  10 mL Intravenous 2 times per day    dexamethasone  6 mg Oral Daily       Physical Exam:  Due to the current efforts to prevent transmission of COVID-19 and also the need to preserve PPE for other caregivers, a face-to-face encounter with the patient was not performed. That being said, all relevant records and diagnostic tests were reviewed, including laboratory results and imaging. Please reference any relevant documentation elsewhere. Care will be coordinated with the primary service. Pertinent/ New Labs and Imaging Studies     Imaging Personally Reviewed:  1/3/2021  1. Significant multifocal bilateral airspace disease more prominent on the   right. 2. Status post placement of a right-sided PICC line.  The catheter tip is at   the SVC right atrial junction. 3. Removal of the NG tube. ECHO  1/1/2021  Summary   Left ventricle grossly normal in size. Mild left ventricular concentric hypertrophy noted. Global hypokinesis is noted to be severe. Estimated left ventricular ejection fraction is 28±5%. <50% criteria for diastolic dysfunction. The LAESV Index is <34ml/m2. Normal right ventricular size. TAPSE is decreased   Physiologic and/or trace mitral regurgitation is present. Trace aortic regurgitation is noted. Physiologic and/or trace tricuspid regurgitation. RVSP is 41 mmHg. Technically good quality study. Technically difficult study due to patient''s body habitus. Suggest clinical correlation.       Labs:  Lab Results   Component Value Date    WBC 6.0 01/07/2021    HGB 11.6 01/07/2021    HCT 37.3 01/07/2021    .1 01/07/2021    MCH 33.6 01/07/2021    MCHC 31.1 01/07/2021    RDW 12.8 01/07/2021     01/07/2021    MPV 11.5 01/07/2021     Lab Results   Component Value Date     01/07/2021    K 4.0 01/07/2021    K 3.5 12/29/2020     01/07/2021    CO2 33 01/07/2021    BUN 24 01/07/2021    CREATININE 0.9 01/07/2021    LABALBU 2.7 01/02/2021    CALCIUM 9.9 01/07/2021    GFRAA >60 01/07/2021    LABGLOM >60 01/07/2021     Lab Results   Component Value Date    PROTIME 12.0 12/28/2020    INR 1.0 12/28/2020     No results for input(s): PROBNP in the last 72 hours. No results for input(s): PROCAL in the last 72 hours. This SmartLink has not been configured with any valid records. Assessment:    1. Acute hypoxic respiratory failure   2. Aspiration pneumonia   3. chf   4. DT   5. Covid 19   6. htn  7. dvt       Plan:   1. Antibiotics completed  2. Oxygen as needed,wean as tolerated, keep sats greater than 92%, currently on 6 L high flow oxygen, continue to wean as tolerated  3. Remdesivir completed   4. bipap overnight and as needed, refused, will not need BiPAP at facility  5. Bronchodilators   6. Cxr 1/3, see above  7. Eliquis for anticoagulation  8. Discharged to [facility] to discharge per pulmonology    Plan of care reviewed in collaboration with Dr. Elver Ovalle, APRN-CNP      I personally saw, examined, and cared for the patient. Labs, medications, radiographs reviewed. I agree with history exam and plans detailed in NP note.       Electronically signed by Maximus Dunn DO on 1/7/2021 at 5:53 PM

## 2021-01-07 NOTE — PROGRESS NOTES
PROGRESS NOTE       PATIENT PROBLEM LIST:  Active Problems:    DVT (deep venous thrombosis) ([facility])    CHF with unknown LVEF ([facility])    COVID-19    Hypokalemia    Hypomagnesemia    Fever and chills    Hypoxia    Acute hypoxemic respiratory failure due to severe acute respiratory syndrome coronavirus 2 (SARS-CoV-2) disease ([facility])  Resolved Problems:    * No resolved hospital problems. *      SUBJECTIVE:  Mann Dumont Is alert but is quite bored and wishes to have physical therapy. He denies any chest discomfort, lightheadedness nor shortness of breath presently. REVIEW OF SYSTEMS:  General ROS: positive for  - fatigue  Psychological ROS:  negative for - anxiety and depression  Ophthalmic ROS:  negative for - decreased vision  or visual distortion. ENT ROS:  negative for - hearing change  Allergy and Immunology ROS: negative  Hematological and Lymphatic ROS: positive for - anemia  Endocrine ROS: negative  Respiratory ROS: positive for - shortness of breath and cough  Cardiovascular ROS: positive for - dyspnea on exertion, irregular heartbeat and shortness of breath  Gastrointestinal ROS: no abdominal pain, change in bowel habits, or black or bloody stools  Genito-Urinary ROS: no dysuria, trouble voiding, or hematuria  Musculoskeletal ROS: negative  Neurological ROS: no TIA or stroke symptoms otherwise no significant change in symptoms or problems since  1/1/2020 as documented in previous progress notes.     SCHEDULED MEDICATIONS:   [START ON 1/7/2021] carvedilol  12.5 mg Oral BID WC    amLODIPine  5 mg Oral Daily    fondaparinux  7.5 mg Subcutaneous Daily    magnesium oxide  400 mg Oral Daily    sacubitril-valsartan  1 tablet Oral BID    bumetanide  0.5 mg Intravenous Daily    spironolactone  25 mg Oral Daily    thiamine  100 mg Oral Daily    multivitamin  1 tablet Oral Daily    pantoprazole  40 mg Oral QAM AC    clotrimazole   Topical BID    heparin flush  3 mL Intravenous 2 times per day    folic acid  1 mg Oral Daily    clopidogrel  75 mg Oral Daily    allopurinol  300 mg Oral Daily    lipase-protease-amylase  36,000 Units Oral TID WC    sodium chloride flush  10 mL Intravenous 2 times per day    dexamethasone  6 mg Oral Daily       VITAL SIGNS:                                                                                                                          BP (!) 176/89   Pulse 84   Temp  98.2 °F (36.8°C) (Oral)   Resp 18   Ht 6' (1.829 m)   Wt 194 lb 3.6 oz (88.1 kg)   SpO2 92%   BMI 26.11 kg/m²   Patient Vitals for the past 96 hrs (Last 3 readings):   Weight   01/05/21 0752 194 lb 3.6 oz (88.1 kg)     OBJECTIVE:  General appearance: Well preserved, mesomorphic body habitus, alert, no distress. Skin: Skin color, texture, turgor normal. No rashes or lesions. No induration or tightening palpated. Head: Normocephalic. No masses, lesions, tenderness or abnormalities  Eyes: Conjunctivae/corneas clear. PERRL, EOMs intact. Sclera non icteric. Ears: External ears normal. Canals clear. TM's clear bilaterally. Hearing normal to finger rub. Nose/Sinuses: Nares normal. Septum midline. Mucosa normal. No drainage or sinus tenderness. Oropharynx: Lips, mucosa, and tongue normal. Oropharynx clear with no exudate seen. Neck: Neck supple and symmetric. No adenopathy. Thyroid symmetric, normal size, without nodules. Trachea is midline. Carotids brisk in upstroke without bruits, no abnormal JVP noted at 45°. Chest: Even excursion  Lungs: Lungs grossly clear to auscultation bilaterally. No retractions or use of accessory muscles. No tactile vocal fremitus. No rhonchi, crackles or rales. Heart:  S1 > S2. Regular, irregular rhythm. Ectopics noted. No gallop or murmur. No rub, palpable thrill or heave noted. PMI 5th intercostal space midclavicular line. Abdomen: Abdomen soft, moderately protuberant, non-tender. BS normal. No masses, organomegaly. No hernia noted.   Extremities: Extremities normal. No deformities, edema, or skin discoloration. No cyanosis or clubbing noted to the nails. Peripheral pulses present 2+ upper extremities and present 1+  lower extremities. Musculoskeletal: Spine ROM normal. Muscular strength intact. Neuro: Cranial nerves intact. Motor: Strength 5/5 in all extremities. Reflexes 2+ in all extremities. No focal weakness. Sensory: grossly normal to touch. Coordination intact. Data:   Scheduled Meds: Reviewed  Continuous Infusions:       Intake/Output Summary (Last 24 hours) at 1/5/2021 1007  Last data filed at 1/6/2021 0853  Gross per 24 hour   Intake 1313 ml   Output  1025 ml   Net + 288 ml     CBC:     01/04/21  0300 01/05/21  0330   WBC 5.2 8.2   HGB 11.3* 11.5*   HCT 36.6* 36.7*    320     BMP:    01/04/21  0300 01/05/21  0330    142   K 3.2* 3.4*    103   CO2 30* 32*   BUN 23 20   CREATININE 1.0 1.0   LABGLOM >60 >60     ABGs:   Lab Results   Component Value Date    PH 7.440 01/02/2021    PO2 66.5 01/02/2021    PCO2 34.3 01/02/2021     INR:   No results for input(s): INR in the last 72 hours. PRO-BNP:   Lab Results   Component Value Date    PROBNP 22,147 (H) 12/30/2020    PROBNP 2,583 (H) 12/28/2020      TSH:   Lab Results   Component Value Date    TSH 3.900 12/21/2019      Cardiac Injury Profile:   No results for input(s): CKTOTAL, CKMB, TROPONINI in the last 72 hours. Lipid Profile:   Lab Results   Component Value Date    TRIG 107 12/21/2019    HDL 74 12/21/2019    LDLCALC 75 12/21/2019    CHOL 170 12/21/2019      Hemoglobin A1C: No components found for: HGBA1C     RAD:   Xr Ribs Left Include Chest (min 3 Views)    Result Date: 12/28/2020  EXAMINATION: 5 XRAY VIEWS OF THE LEFT RIBS WITH FRONTAL XRAY VIEW OF THE CHEST 12/28/2020 9:17 am COMPARISON: None. HISTORY: ORDERING SYSTEM PROVIDED HISTORY: Rib pain after fall TECHNOLOGIST PROVIDED HISTORY: Reason for exam:->Rib pain after fall FINDINGS: There is no right or left rib fracture.   Multiple views the left wrist were obtained. There is no left pneumothorax or lung contusion. Multifocal bilateral pulmonary infiltrates are noted more prominent within the right upper lobe consistent with pneumonia. The heart is enlarged. 1. There is no appreciable left rib fracture or pneumothorax 2. Multifocal pneumonia more prominent within the right upper lobe. Xr Chest Portable    Result Date: 2020  EXAMINATION: ONE XRAY VIEW OF THE CHEST 2020 10:24 pm COMPARISON: 2020 HISTORY: ORDERING SYSTEM PROVIDED HISTORY: hjypoxia TECHNOLOGIST PROVIDED HISTORY: Reason for exam:->hjypoxia FINDINGS: Increasing right lung airspace opacities compared to prior study. The left lung is clear. No sizable effusion or pneumothorax. Stable cardiomegaly. No gross osseous abnormalities. Increasing right lung airspace opacities compared to prior study. Xr Hip 2-3 Vw W Pelvis Left    Result Date: 2020  EXAMINATION: ONE XRAY VIEW OF THE PELVIS AND TWO XRAY VIEWS LEFT HIP for three views total 2020 9:17 am COMPARISON: None. HISTORY: ORDERING SYSTEM PROVIDED HISTORY: fall - pain TECHNOLOGIST PROVIDED HISTORY: Reason for exam:->fall - pain FINDINGS: No fracture or dislocation. The sacroiliac and hip joints appear preserved for age. Pelvic phleboliths. Lumbar spine levoscoliosis and degenerative spondylosis. Atherosclerotic calcifications. 1.  Negative for fracture or acute osseous abnormality. 2.  Chronic findings, as above    NM Cardiac Stress Test Nuclear Imaging    Patient MRN:  [id number]   : 1945   Age: 66 years   Gender: Male   Order Date:  2019 4:45 PM   EXAM: NM MYOCARDIAL SPECT REST EXERCISE OR RX   Number of Images: 9 views   INDICATION:  Chest pain    Reason for Exam?->Chest pain   Procedure Type->Rx    COMPARISON: None       TECHNIQUE:   10 mCi of Tc-99m MIBI was injected intravenously at rest and cardiac   SPECT images were performed.  In addition 31 mCi of Tc-99m MIBI was injected intravenously at maximum stress by using Lexiscan stress. Stress SPECT images and gated study were performed.        FINDINGS:   Perfusion images demonstrate no reversible perfusion defect. Wall motion is hypokinetic   The end diastolic volume is 414 ml. The end systolic volume is 77 ml. The estimated ejection fraction is 28 %. Impression   1. No reversible perfusion defect   2. Ejection fraction is 28 %. 3. Wall motion appears to be hypokinetic       EKG: See Report  Echo: 12/21/2019  Summary   Ejection fraction is visually estimated at 40-45%. LV diastolic dysfunction   Trileaflet aortic valve. Normal leaflet mobility. No aortic stenosis. No aortic regurgitation. The left atrium is mildly dilated. Mildly dilated aortic root. Physiologic and/or trace mitral regurgitation is present. No evidence of hemodynamically significant mitral stenosis. No evidence for hemodynamically significant pericardial effusion. Normal right ventricle structure and function. Normal tricuspid valve structure and function. IMPRESSIONS:  Active Problems:    DVT (deep venous thrombosis) ([facility])    CHF with unknown LVEF ([facility])    COVID-19    Hypokalemia    Hypomagnesemia    Fever and chills    Hypoxia    Acute hypoxemic respiratory failure due to severe acute respiratory syndrome coronavirus 2 (SARS-CoV-2) disease ([facility])  Resolved Problems:    * No resolved hospital problems. *      RECOMMENDATIONS:  Mr. Dominick Martínez seems relatively stable presently. Continue present medications and carefu;;y monitor renal status. Also continue to  monitor cardiac rhythm as he has experienced nonsustained salvos of ventricular tachycardia. Titrate  neprilysin inhibitor.   I have spent more than 25 minutes face to face with Denisetyrel Lema and reviewing notes and laboratory data, with greater than 50% of this time instructing and counseling the patient face to face regarding my findings and recommendations and I have answered all questions as posed to me by Mr. Yuriy العلي. Abhilash Streeter, DO FACP,FACC,FSCAI      NOTE:  This report was transcribed using voice recognition software.   Every effort was made to ensure accuracy; however, inadvertent computerized transcription errors may be present

## 2021-01-07 NOTE — PROGRESS NOTES
Occupational Therapy  OT BEDSIDE TREATMENT NOTE      Date:2021  Patient Name: Saeid Segura  MRN: [id number]  : 1945  Room: [address] [place] [address]     Referring Provider: Lane Gomez MD     Evaluating OT: Dale Moscoso OTR/L [id number]     AM-PAC Daily Activity Raw Score: 16/24     Recommended Adaptive Equipment: TBD     Diagnosis: acute respiratory failure. Pt presents to ED from home with an \"almost fall\" and generalized weakness. Pertinent Medical History: CHF   Precautions:  Falls, droplet plus isolation COVID, high flow O2     Home Living: Pt lives with his partner in a 2 story home with B/B 2nd floor. Bathroom setup: walk in shower, standard commode      Prior Level of Function: Per pt report, Mod I with ADL/IADLs; completed functional mobility with no AD. No home O2     Pain Level: Pt states no pain during this session.      Cognition: Awake and alert. Cues for safety awareness. Functional Assessment:    Initial Eval Status  Date: 21 Treatment session:  Short Term Goals      Feeding Set up Setup       Grooming Min A  setup seated Set up   UB Dressing Min A  Donning/doffing [facility] gown  min A to don gown Set up   LB Dressing Max A  Management of B socks  max A  Min A    Bathing Max A   Min A   Toileting Mod A  Post toileting care, incontinence present upon standing at EOB  assist for hygiene SBA   Bed Mobility  Supine <> sit: Mod A  min A supine to sit      Functional Transfers STS: Mod A  min A transfer from bed and chair. SBA   Functional Mobility Min A with HHA  Small side steps to HOB  Mild unsteadiness  min A pivot to chair. Pt refusing walker however would have benefited from assistive device for balance/stability.   SBA during ADLs   Balance Sitting: fair  Sitting unsupported tolerance x8-9 min     Standing: fair minus  Standing michael x1-2 min, 2 trials       Activity Tolerance Poor  O2 10L restin%  Seated EOB: 96%  With minimal exertion: 93% Poor+   SOB however O2 saturation 95% during activity.    standing michael x6-7 min with fair plus balance during self care tasks       Comments:  Pt pleasant and cooperative. Agreeable to sit in a chair at end of the session. Chair alarm activated. Education/treatment:  ADL retraining with facilitation of movement to increase self care skills. Therapeutic activity to address balance, strength, and endurance for ADL and transfers. Pt education of transfer safety and energy conservation. · Pt has made  progress towards set goals. Plan of Care: 1-4 days/week for 1-2 weeks PRN   [x]? ?ADL retraining/adaptive techniques and AE recommendations to increase functional independence within precautions                    [x]? ? Energy conservation techniques to improve tolerance for ADL/IADLs  [x]? ? Functional transfer/mobility training/DME recommendations for increased independence, safety and fall prevention         [x]? ?Patient/family education to increase safety and functional independence during daily routine          [x]? ? Environmental modifications for safe mobility and completion of ADLs                             []? ? Cognitive retraining to improve problem solving skills & safe participation in ADLs/IADLs     []? ?Sensory re-education techniques to improve extremity awareness, maintain skin integrity and improve hand function                             []? ? Visual/Perceptual retraining to improve body awareness and safety during transfers and ADLs  []? ? Splinting/positioning needs to maintain joint/skin integrity and contracture prevention  [x]? ? Therapeutic activity to improve functional performance during ADLs                                        [x]? ? Therapeutic exercise to improve tolerance and functional strength for ADLs   [x]? ? Balance retraining/tolerance tasks for facilitation of postural control with dynamic challenges during ADLs  []? ?Neuromuscular re-education to facilitate righting/equilibrium reactions,

## 2021-01-07 NOTE — PROGRESS NOTES
Pt discharged per order. Right arm PICC line removed per order. Dry sterile dressing in place. No bleeding noted. Pt's significant other, Jorge De Los Santos, notified and aware via telephone to  pt's medication. Per narc sheet medicine appears to hydrocodone/tylenol (was dropped off in unlabeled pill bottle during admission). Celia Kline agreed to  medicine at lobby at 2 pm.  Mee Sapp, supervisor, aware and Celina Braun RN, witness for pickup.

## 2021-01-07 NOTE — CARE COORDINATION
Social work / Discharge Planning:        [facility]. Plan is for discharge to [facility]. No precert needed. N-17, [id number] & ambulance form completed. Electronically signed by MANDIE Hwang on 1/7/2021 at [place] AM            [facility] will transport at 1pm.   Social work updated RN. CM is updating the facility liaison. CM already updated the emergency contact and patient.    Electronically signed by MANDIE Hwang on 1/7/2021 at 11:36 AM

## 2021-01-07 NOTE — PROGRESS NOTES
PROGRESS NOTE       PATIENT PROBLEM LIST:  Active Problems:    DVT (deep venous thrombosis) ([facility])    CHF with unknown LVEF ([facility])    COVID-19    Hypokalemia    Hypomagnesemia    Fever and chills    Hypoxia    Acute hypoxemic respiratory failure due to severe acute respiratory syndrome coronavirus 2 (SARS-CoV-2) disease ([facility])  Resolved Problems:    * No resolved hospital problems. *      SUBJECTIVE:  Ozell Halsted remains alert but remains in bed up to this time. He  His upset with some of his care during this stay. [place] His blood pressure remains elevated. REVIEW OF SYSTEMS:  General ROS: positive for  - fatigue  Psychological ROS:  negative for - anxiety and depression  Ophthalmic ROS:  negative for - decreased vision  or visual distortion. ENT ROS:  negative for - hearing change  Allergy and Immunology ROS: negative  Hematological and Lymphatic ROS: positive for - anemia  Endocrine ROS: negative  Respiratory ROS: positive for - shortness of breath and cough  Cardiovascular ROS: positive for - dyspnea on exertion, irregular heartbeat and shortness of breath  Gastrointestinal ROS: no abdominal pain, change in bowel habits, or black or bloody stools  Genito-Urinary ROS: no dysuria, trouble voiding, or hematuria  Musculoskeletal ROS: negative  Neurological ROS: no TIA or stroke symptoms otherwise no significant change in symptoms or problems since  1/1/2020 as documented in previous progress notes.     SCHEDULED MEDICATIONS:   [START ON 1/8/2021] apixaban  5 mg Oral Q12H    carvedilol  12.5 mg Oral BID WC    amLODIPine  5 mg Oral Daily    magnesium oxide  400 mg Oral Daily    sacubitril-valsartan  1 tablet Oral BID    bumetanide  0.5 mg Intravenous Daily    spironolactone  25 mg Oral Daily    thiamine  100 mg Oral Daily    multivitamin  1 tablet Oral Daily    pantoprazole  40 mg Oral QAM AC    clotrimazole   Topical BID    heparin flush  3 mL Intravenous 2 times per day    folic acid  1 mg Oral Daily    clopidogrel  75 mg Oral Daily    allopurinol  300 mg Oral Daily    lipase-protease-amylase  36,000 Units Oral TID     sodium chloride flush  10 mL Intravenous 2 times per day    dexamethasone  6 mg Oral Daily       VITAL SIGNS:                                                                                                                          /81   Pulse 76   Temp 97.3 °F (36.3 °C) (Oral)   Resp 18   Ht 6' (1.829 m)   Wt 192 lb 8 oz (87.3 kg)   SpO2 96%   BMI 26.11 kg/m²   Patient Vitals for the past 96 hrs (Last 3 readings):   Weight   01/06/21 0357 192 lb 8 oz (87.3 kg)     OBJECTIVE:  General appearance: Well preserved, mesomorphic body habitus, alert, no distress. Skin: Skin color, texture, turgor normal. No rashes or lesions. No induration or tightening palpated. Head: Normocephalic. No masses, lesions, tenderness or abnormalities  Eyes: Conjunctivae/corneas clear. PERRL, EOMs intact. Sclera non icteric. Ears: External ears normal. Canals clear. TM's clear bilaterally. Hearing normal to finger rub. Nose/Sinuses: Nares normal. Septum midline. Mucosa normal. No drainage or sinus tenderness. Oropharynx: Lips, mucosa, and tongue normal. Oropharynx clear with no exudate seen. Neck: Neck supple and symmetric. No adenopathy. Thyroid symmetric, normal size, without nodules. Trachea is midline. Carotids brisk in upstroke without bruits, no abnormal JVP noted at 45°. Chest: Even excursion  Lungs: Lungs grossly clear to auscultation bilaterally. No retractions or use of accessory muscles. No tactile vocal fremitus. No rhonchi, crackles or rales. Heart:  S1 > S2. Regular, irregular rhythm. Ectopics noted. No gallop or murmur. No rub, palpable thrill or heave noted. PMI 5th intercostal space midclavicular line. Abdomen: Abdomen soft, moderately protuberant, non-tender. BS normal. No masses, organomegaly. No hernia noted.   Extremities: Extremities normal. No deformities, edema, or skin discoloration. No cyanosis or clubbing noted to the nails. Peripheral pulses present 2+ upper extremities and present 1+  lower extremities. Musculoskeletal: Spine ROM normal. Muscular strength intact. Neuro: Cranial nerves intact. Motor: Strength 5/5 in all extremities. Reflexes 2+ in all extremities. No focal weakness. Sensory: grossly normal to touch. Coordination intact. Data:   Scheduled Meds: Reviewed  Continuous Infusions:       Intake/Output Summary (Last 24 hours) at 1/7/2021 1144  Last data filed at 1/6/2021 1610  Gross per 24 hour   Intake --   Output 1150 ml   Net -1150 ml     CBC:   Recent Labs     01/05/21  0330 01/06/21  0356 01/07/21  0520   WBC 8.2 6.6 6.0   HGB 11.5* 11.0* 11.6*   HCT 36.7* 33.9* 37.3    324 366     BMP:  Recent Labs     01/05/21 0330 01/06/21 0356 01/07/21 0520    139 142   K 3.4* 3.9 4.0    102 102   CO2 32* 31* 33*   BUN 20 20 24*   CREATININE 1.0 1.0 0.9   LABGLOM >60 >60 >60     ABGs:   Lab Results   Component Value Date    PH 7.440 01/02/2021    PO2 66.5 01/02/2021    PCO2 34.3 01/02/2021     INR:   No results for input(s): INR in the last 72 hours. PRO-BNP:   Lab Results   Component Value Date    PROBNP 22,147 (H) 12/30/2020    PROBNP 2,583 (H) 12/28/2020      TSH:   Lab Results   Component Value Date    TSH 3.900 12/21/2019      Cardiac Injury Profile:   No results for input(s): CKTOTAL, CKMB, TROPONINI in the last 72 hours. Lipid Profile:   Lab Results   Component Value Date    TRIG 107 12/21/2019    HDL 74 12/21/2019    LDLCALC 75 12/21/2019    CHOL 170 12/21/2019      Hemoglobin A1C: No components found for: HGBA1C     RAD:   Xr Ribs Left Include Chest (min 3 Views)    Result Date: 12/28/2020  EXAMINATION: 5 XRAY VIEWS OF THE LEFT RIBS WITH FRONTAL XRAY VIEW OF THE CHEST 12/28/2020 9:17 am COMPARISON: None.  HISTORY: ORDERING SYSTEM PROVIDED HISTORY: Rib pain after fall TECHNOLOGIST PROVIDED HISTORY: Reason for exam:->Rib pain after fall FINDINGS: There is no right or left rib fracture. Multiple views the left wrist were obtained. There is no left pneumothorax or lung contusion. Multifocal bilateral pulmonary infiltrates are noted more prominent within the right upper lobe consistent with pneumonia. The heart is enlarged. 1. There is no appreciable left rib fracture or pneumothorax 2. Multifocal pneumonia more prominent within the right upper lobe. Xr Chest Portable    Result Date: 2020  EXAMINATION: ONE XRAY VIEW OF THE CHEST 2020 10:24 pm COMPARISON: 2020 HISTORY: ORDERING SYSTEM PROVIDED HISTORY: hjypoxia TECHNOLOGIST PROVIDED HISTORY: Reason for exam:->hjypoxia FINDINGS: Increasing right lung airspace opacities compared to prior study. The left lung is clear. No sizable effusion or pneumothorax. Stable cardiomegaly. No gross osseous abnormalities. Increasing right lung airspace opacities compared to prior study. Xr Hip 2-3 Vw W Pelvis Left    Result Date: 2020  EXAMINATION: ONE XRAY VIEW OF THE PELVIS AND TWO XRAY VIEWS LEFT HIP for three views total 2020 9:17 am COMPARISON: None. HISTORY: ORDERING SYSTEM PROVIDED HISTORY: fall - pain TECHNOLOGIST PROVIDED HISTORY: Reason for exam:->fall - pain FINDINGS: No fracture or dislocation. The sacroiliac and hip joints appear preserved for age. Pelvic phleboliths. Lumbar spine levoscoliosis and degenerative spondylosis. Atherosclerotic calcifications. 1.  Negative for fracture or acute osseous abnormality.  2.  Chronic findings, as above    NM Cardiac Stress Test Nuclear Imaging    Patient MRN:  [id number]   : 1945   Age: 66 years   Gender: Male   Order Date:  2019 4:45 PM   EXAM: NM MYOCARDIAL SPECT REST EXERCISE OR RX   Number of Images: 9 views   INDICATION:  Chest pain    Reason for Exam?->Chest pain   Procedure Type->Rx    COMPARISON: None       TECHNIQUE:   10 mCi of Tc-99m MIBI was injected intravenously at rest and cardiac the next 3 months. I have spent more than 25 minutes face to face with Celi Wu and reviewing notes and laboratory data, with greater than 50% of this time instructing and counseling the patient face to face regarding my findings and recommendations and I have answered all questions as posed to me by Mr. Lynsey Herrera. Krishna Jimenez, DO FACP,FACC,FSCAI      NOTE:  This report was transcribed using voice recognition software.   Every effort was made to ensure accuracy; however, inadvertent computerized transcription errors may be present

## 2021-01-07 NOTE — PROGRESS NOTES
PROGRESS NOTE       PATIENT PROBLEM LIST:  Active Problems:    DVT (deep venous thrombosis) ([facility])    CHF with unknown LVEF (HCC)    COVID-19    Hypokalemia    Hypomagnesemia    Fever and chills    Hypoxia    Acute hypoxemic respiratory failure due to severe acute respiratory syndrome coronavirus 2 (SARS-CoV-2) disease ([facility])  Resolved Problems:    * No resolved hospital problems. *      SUBJECTIVE:  Araceli Burkett remains alert but remains in bed up to this time. He  His upset with some of his care during this stay. [place] [employer] His blood pressure remains elevated. REVIEW OF SYSTEMS:  General ROS: positive for  - fatigue  Psychological ROS:  negative for - anxiety and depression  Ophthalmic ROS:  negative for - decreased vision  or visual distortion. ENT ROS:  negative for - hearing change  Allergy and Immunology ROS: negative  Hematological and Lymphatic ROS: positive for - anemia  Endocrine ROS: negative  Respiratory ROS: positive for - shortness of breath and cough  Cardiovascular ROS: positive for - dyspnea on exertion, irregular heartbeat and shortness of breath  Gastrointestinal ROS: no abdominal pain, change in bowel habits, or black or bloody stools  Genito-Urinary ROS: no dysuria, trouble voiding, or hematuria  Musculoskeletal ROS: negative  Neurological ROS: no TIA or stroke symptoms otherwise no significant change in symptoms or problems since  1/1/2020 as documented in previous progress notes.     SCHEDULED MEDICATIONS:   amLODIPine  5 mg Oral Daily    fondaparinux  7.5 mg Subcutaneous Daily    magnesium oxide  400 mg Oral Daily    sacubitril-valsartan  1 tablet Oral BID    bumetanide  0.5 mg Intravenous Daily    metoprolol tartrate  100 mg Oral BID    spironolactone  25 mg Oral Daily    thiamine  100 mg Oral Daily    multivitamin  1 tablet Oral Daily    pantoprazole  40 mg Oral QAM AC    clotrimazole   Topical BID    heparin flush  3 mL Intravenous 2 times per day    folic acid  1 mg Oral Daily    clopidogrel  75 mg Oral Daily    allopurinol  300 mg Oral Daily    lipase-protease-amylase  36,000 Units Oral TID     sodium chloride flush  10 mL Intravenous 2 times per day    dexamethasone  6 mg Oral Daily       VITAL SIGNS:                                                                                                                          BP (!) 159/75   Pulse 80   Temp 96.9 °F (36.1 °C) (Oral)   Resp 18   Ht 6' (1.829 m)   Wt 192 lb 8 oz (87.3 kg)   SpO2 96%   BMI 26.11 kg/m²   Patient Vitals for the past 96 hrs (Last 3 readings):   Weight   01/06/21 0357 192 lb 8 oz (87.3 kg)     OBJECTIVE:  General appearance: Well preserved, mesomorphic body habitus, alert, no distress. Skin: Skin color, texture, turgor normal. No rashes or lesions. No induration or tightening palpated. Head: Normocephalic. No masses, lesions, tenderness or abnormalities  Eyes: Conjunctivae/corneas clear. PERRL, EOMs intact. Sclera non icteric. Ears: External ears normal. Canals clear. TM's clear bilaterally. Hearing normal to finger rub. Nose/Sinuses: Nares normal. Septum midline. Mucosa normal. No drainage or sinus tenderness. Oropharynx: Lips, mucosa, and tongue normal. Oropharynx clear with no exudate seen. Neck: Neck supple and symmetric. No adenopathy. Thyroid symmetric, normal size, without nodules. Trachea is midline. Carotids brisk in upstroke without bruits, no abnormal JVP noted at 45°. Chest: Even excursion  Lungs: Lungs grossly clear to auscultation bilaterally. No retractions or use of accessory muscles. No tactile vocal fremitus. No rhonchi, crackles or rales. Heart:  S1 > S2. Regular, irregular rhythm. Ectopics noted. No gallop or murmur. No rub, palpable thrill or heave noted. PMI 5th intercostal space midclavicular line. Abdomen: Abdomen soft, moderately protuberant, non-tender. BS normal. No masses, organomegaly. No hernia noted.   Extremities: Extremities normal. No deformities, edema, or skin discoloration. No cyanosis or clubbing noted to the nails. Peripheral pulses present 2+ upper extremities and present 1+  lower extremities. Musculoskeletal: Spine ROM normal. Muscular strength intact. Neuro: Cranial nerves intact. Motor: Strength 5/5 in all extremities. Reflexes 2+ in all extremities. No focal weakness. Sensory: grossly normal to touch. Coordination intact. Data:   Scheduled Meds: Reviewed  Continuous Infusions:       Intake/Output Summary (Last 24 hours) at 1/6/2021 2339  Last data filed at 1/6/2021 1610  Gross per 24 hour   Intake 120 ml   Output 1525 ml   Net -1405 ml     CBC:   Recent Labs     01/04/21 0300 01/05/21  0330 01/06/21  0356   WBC 5.2 8.2 6.6   HGB 11.3* 11.5* 11.0*   HCT 36.6* 36.7* 33.9*    320 324     BMP:  Recent Labs     01/04/21 0300 01/05/21 0330 01/06/21  0356    142 139   K 3.2* 3.4* 3.9    103 102   CO2 30* 32* 31*   BUN 23 20 20   CREATININE 1.0 1.0 1.0   LABGLOM >60 >60 >60     ABGs:   Lab Results   Component Value Date    PH 7.440 01/02/2021    PO2 66.5 01/02/2021    PCO2 34.3 01/02/2021     INR:   No results for input(s): INR in the last 72 hours. PRO-BNP:   Lab Results   Component Value Date    PROBNP 22,147 (H) 12/30/2020    PROBNP 2,583 (H) 12/28/2020      TSH:   Lab Results   Component Value Date    TSH 3.900 12/21/2019      Cardiac Injury Profile:   No results for input(s): CKTOTAL, CKMB, TROPONINI in the last 72 hours. Lipid Profile:   Lab Results   Component Value Date    TRIG 107 12/21/2019    HDL 74 12/21/2019    LDLCALC 75 12/21/2019    CHOL 170 12/21/2019      Hemoglobin A1C: No components found for: HGBA1C     RAD:   Xr Ribs Left Include Chest (min 3 Views)    Result Date: 12/28/2020  EXAMINATION: 5 XRAY VIEWS OF THE LEFT RIBS WITH FRONTAL XRAY VIEW OF THE CHEST 12/28/2020 9:17 am COMPARISON: None.  HISTORY: ORDERING SYSTEM PROVIDED HISTORY: Rib pain after fall TECHNOLOGIST PROVIDED HISTORY: Reason for exam:->Rib pain after fall FINDINGS: There is no right or left rib fracture. Multiple views the left wrist were obtained. There is no left pneumothorax or lung contusion. Multifocal bilateral pulmonary infiltrates are noted more prominent within the right upper lobe consistent with pneumonia. The heart is enlarged. 1. There is no appreciable left rib fracture or pneumothorax 2. Multifocal pneumonia more prominent within the right upper lobe. Xr Chest Portable    Result Date: 2020  EXAMINATION: ONE XRAY VIEW OF THE CHEST 2020 10:24 pm COMPARISON: 2020 HISTORY: ORDERING SYSTEM PROVIDED HISTORY: hjypoxia TECHNOLOGIST PROVIDED HISTORY: Reason for exam:->hjypoxia FINDINGS: Increasing right lung airspace opacities compared to prior study. The left lung is clear. No sizable effusion or pneumothorax. Stable cardiomegaly. No gross osseous abnormalities. Increasing right lung airspace opacities compared to prior study. Xr Hip 2-3 Vw W Pelvis Left    Result Date: 2020  EXAMINATION: ONE XRAY VIEW OF THE PELVIS AND TWO XRAY VIEWS LEFT HIP for three views total 2020 9:17 am COMPARISON: None. HISTORY: ORDERING SYSTEM PROVIDED HISTORY: fall - pain TECHNOLOGIST PROVIDED HISTORY: Reason for exam:->fall - pain FINDINGS: No fracture or dislocation. The sacroiliac and hip joints appear preserved for age. Pelvic phleboliths. Lumbar spine levoscoliosis and degenerative spondylosis. Atherosclerotic calcifications. 1.  Negative for fracture or acute osseous abnormality.  2.  Chronic findings, as above    NM Cardiac Stress Test Nuclear Imaging    Patient MRN:  [id number]   : 1945   Age: 66 years   Gender: Male   Order Date:  2019 4:45 PM   EXAM: NM MYOCARDIAL SPECT REST EXERCISE OR RX   Number of Images: 9 views   INDICATION:  Chest pain    Reason for Exam?->Chest pain   Procedure Type->Rx    COMPARISON: None       TECHNIQUE:   10 mCi of Tc-99m MIBI was injected intravenously at rest and cardiac   SPECT images were performed. In addition 31 mCi of Tc-99m MIBI was   injected intravenously at maximum stress by using Lexiscan stress. Stress SPECT images and gated study were performed.        FINDINGS:   Perfusion images demonstrate no reversible perfusion defect. Wall motion is hypokinetic   The end diastolic volume is 477 ml. The end systolic volume is 77 ml. The estimated ejection fraction is 28 %. Impression   1. No reversible perfusion defect   2. Ejection fraction is 28 %. 3. Wall motion appears to be hypokinetic       EKG: See Report  Echo: 12/21/2019  Summary   Ejection fraction is visually estimated at 40-45%. LV diastolic dysfunction   Trileaflet aortic valve. Normal leaflet mobility. No aortic stenosis. No aortic regurgitation. The left atrium is mildly dilated. Mildly dilated aortic root. Physiologic and/or trace mitral regurgitation is present. No evidence of hemodynamically significant mitral stenosis. No evidence for hemodynamically significant pericardial effusion. Normal right ventricle structure and function. Normal tricuspid valve structure and function. IMPRESSIONS:  Active Problems:    DVT (deep venous thrombosis) ([facility])    CHF with unknown LVEF ([facility])    COVID-19    Hypokalemia    Hypomagnesemia    Fever and chills    Hypoxia    Acute hypoxemic respiratory failure due to severe acute respiratory syndrome coronavirus 2 (SARS-CoV-2) disease ([facility])  Resolved Problems:    * No resolved hospital problems. *      RECOMMENDATIONS:  Mr. Taina Barriga . Is anxious to rehabilitate and go home. Fortunately no further thrombotic events have occurred while on present medical management. Would strongly recommend some form at the physical therapy as he is becoming more and more disgruntled. Will repeat pro-BMP in a.m. and make final adjustment to his cardiac medications.      I have spent more than 25 minutes face to face with Marta Locks and reviewing notes and laboratory data, with greater than 50% of this time instructing and counseling the patient face to face regarding my findings and recommendations and I have answered all questions as posed to me by Mr. Saul Jeronimo. Ted Acevedo, DO FACP,FACC,[facility]      NOTE:  This report was transcribed using voice recognition software.   Every effort was made to ensure accuracy; however, inadvertent computerized transcription errors may be present

## 2021-01-07 NOTE — PROGRESS NOTES
Called and gave nurse to nurse report to Adry Baldwin at [facility].   Pt for  at 1 pm.

## 2021-01-07 NOTE — DISCHARGE SUMMARY
[facility] Physician Discharge Summary     [address]  [address] [facility]  [phone]    Activity level   As per therapy    Disposition   SNF      Condition on discharge Stable    Patient ID   Josh Sue, 66 y. o.male /  1945  / MRN [id number]    Admit date   2020    Discharge date  2021  12:32 PM    Admission diagnoses Active Problems:    DVT (deep venous thrombosis) (HCC)    CHF with unknown LVEF (HCC)    COVID-19    Hypokalemia    Hypomagnesemia    Fever and chills    Hypoxia    Acute hypoxemic respiratory failure due to severe acute respiratory syndrome coronavirus 2 (SARS-CoV-2) disease ([facility] [address]  Resolved Problems:    * No resolved hospital problems. *    Discharge diagnoses Same    Consults   IP CONSULT TO CARDIOLOGY  IP CONSULT TO PHARMACY  IP CONSULT TO IV TEAM  IP CONSULT TO PHARMACY  IP CONSULT TO CRITICAL CARE  IP CONSULT TO SOCIAL WORK  IP CONSULT TO DIETITIAN    Procedures   See hospital course    Hospital Course  76M w PMH HFrEF, HTN, past LLE DVT, gout presented to ED w weakness and hypoxia on , fell, had L rib and hip pain.  COVID positive, CXR w multifocal pna.  Pt charted at 90% on RA at the lowest.  Admitted for further monitoring and tx. Late  pt w resp distress and moved to ICU, given Lasix, started on Precedex/CIWA protocol and BiPAP. Started on remdesivir and Decadron also Zosyn for concerns of aspiration pneumonia. Improved well, off BiPAP and on 6LNC today. Has completed remdesivir, Decadron, Zosyn. On Eliquis for DVT ppx per cardio. Was for LTAC but now w O2 requirement down to [facility], pt medically stable and clear for dc to SNF, [facility], this afternoon.     Discharge Exam  /81   Pulse 76   Temp 97.3 °F (36.3 °C) (Oral)   Resp 18   Ht 6' (1.829 m)   Wt 192 lb 8 oz (87.3 kg)   SpO2 96%   BMI 26.11 kg/m²   General Appearance: alert and oriented to person, place and time tablet by mouth daily  Start taking on: January 8, 2021     carvedilol 12.5 MG tablet  Commonly known as: COREG  Take 1 tablet by mouth 2 times daily (with meals)     clopidogrel 75 MG tablet  Commonly known as: PLAVIX  Take 1 tablet by mouth daily  Start taking on: January 8, 1700     folic acid 1 MG tablet  Commonly known as: FOLVITE  Take 1 tablet by mouth daily  Start taking on: January 8, 2021     magnesium oxide 400 (241.3 Mg) MG Tabs tablet  Commonly known as: MAG-OX  Take 1 tablet by mouth daily  Start taking on: January 8, 2021     multivitamin Tabs tablet  Take 1 tablet by mouth daily  Start taking on: January 8, 2021     pantoprazole 40 MG tablet  Commonly known as: PROTONIX  Take 1 tablet by mouth every morning (before breakfast)  Start taking on: January 8, 2021     thiamine 100 MG tablet  Take 1 tablet by mouth daily  Start taking on: January 8, 2021        CHANGE how you take these medications    bumetanide 1 MG tablet  Commonly known as: BUMEX  Take 1 tablet by mouth daily  What changed:   · when to take this  · reasons to take this  · additional instructions        CONTINUE taking these medications    allopurinol 300 MG tablet  Commonly known as: ZYLOPRIM     HYDROcodone-acetaminophen 7.5-325 MG per tablet  Commonly known as: NORCO  Take 1 tablet by mouth every 6 hours as needed for Pain for up to 5 days.      lipase-protease-amylase 12319 units Cpep delayed release capsule  Commonly known as: CREON     loperamide 2 MG capsule  Commonly known as: IMODIUM     sacubitril-valsartan 24-26 MG per tablet  Commonly known as: ENTRESTO  Take 1 tablet by mouth 2 times daily     spironolactone 25 MG tablet  Commonly known as: ALDACTONE  Take 1 tablet by mouth daily        STOP taking these medications    metoprolol succinate 50 MG extended release tablet  Commonly known as: TOPROL XL     omeprazole 20 MG delayed release capsule  Commonly known as: PRILOSEC        ASK your doctor about these medications Eliquis 5 MG Tabs tablet  Generic drug: apixaban           Where to Get Your Medications      You can get these medications from any pharmacy    Bring a paper prescription for each of these medications  · HYDROcodone-acetaminophen 7.5-325 MG per tablet     Information about where to get these medications is not yet available    Ask your nurse or doctor about these medications  · amLODIPine 5 MG tablet  · carvedilol 12.5 MG tablet  · clopidogrel 75 MG tablet  · folic acid 1 MG tablet  · magnesium oxide 400 (241.3 Mg) MG Tabs tablet  · multivitamin Tabs tablet  · pantoprazole 40 MG tablet  · spironolactone 25 MG tablet  · thiamine 100 MG tablet       Note that more than 30 minutes was spent in preparing discharge papers, discussing discharge with patient, medication review, etc.    Electronically signed by Celeste Contreras DO on 1/7/2021 at 12:32 PM

## 2021-01-07 NOTE — DISCHARGE INSTR - COC
Continuity of Care Form    Patient Name: Mann Dumont   :  1945  MRN:  [id number]    Admit date:  2020  Discharge date:  ***    Code Status Order: Full Code   Advance Directives:   Advance Care Flowsheet Documentation       Date/Time Healthcare Directive Type of Healthcare Directive Copy in [address] Agent's Name Healthcare Agent's Phone Number    20 1353  No, patient does not have an advance directive for healthcare treatment -- -- -- -- --            Admitting Physician:  Paresh Rehman MD  PCP: Sarah eBckham MD    Discharging Nurse: [facility] Unit/Room#: 2746/4928-G  Discharging Unit Phone Number: ***    Emergency Contact:   Extended Emergency Contact Information  Primary Emergency Contact: Fidel Aguilar  Address: [address] [place] [address] Phone: [phone]  Mobile Phone: [phone]  Relation: Domestic Partner   needed? No    Past Surgical History:  Past Surgical History:   Procedure Laterality Date    [facility], [facility]. INSERT PICC CATH, 5> YRS  2021            Immunization History: There is no immunization history on file for this patient.     Active Problems:  Patient Active Problem List   Diagnosis Code    DVT (deep venous thrombosis) ([facility]) I82.409    CHF with unknown LVEF ([facility]) I50.9    Chest pain R07.9    COVID-19 U07.1    Hypokalemia E87.6    Hypomagnesemia E83.42    Fever and chills R50.9    Hypoxia R09.02    Acute hypoxemic respiratory failure due to severe acute respiratory syndrome coronavirus 2 (SARS-CoV-2) disease ([facility]) U07.1, J96.01       Isolation/Infection:   Isolation            Droplet Plus          Patient Infection Status       Infection Onset Added Last Indicated Last Indicated By Review Planned Expiration Resolved Resolved By    COVID-19 20 COVID-19 21      Resolved    COVID-19 Rule Out 20 12/28/20 12/28/20 COVID-19 (Ordered)   12/28/20 Rule-Out Test Resulted            Nurse Assessment:  Last Vital Signs: /81   Pulse 76   Temp 97.3 °F (36.3 °C) (Oral)   Resp 18   Ht 6' (1.829 m)   Wt 192 lb 8 oz (87.3 kg)   SpO2 96%   BMI 26.11 kg/m²     Last documented pain score (0-10 scale): Pain Level: 6  Last Weight:   Wt Readings from Last 1 Encounters:   01/06/21 192 lb 8 oz (87.3 kg)     Mental Status:  oriented, alert, coherent, logical, thought processes intact and able to concentrate and follow conversation    IV Access:  - None    Nursing Mobility/ADLs:  Walking   Assisted  Transfer  Assisted  Bathing  Dependent  Dressing  Assisted  Toileting  Dependent  Feeding  Assisted  Med Admin  Independent  Med Delivery   whole    Wound Care Documentation and Therapy:        Elimination:  Continence:   · Bowel: No  · Bladder: No  Urinary Catheter: None   Colostomy/Ileostomy/Ileal Conduit: No       Date of Last BM: 1-7-21    Intake/Output Summary (Last 24 hours) at 1/7/2021 1116  Last data filed at 1/6/2021 1610  Gross per 24 hour   Intake --   Output 1150 ml   Net -1150 ml     I/O last 3 completed shifts:  In: -   Out: 1150 [Urine:1150]    Safety Concerns:     History of Falls (last 30 days)    Impairments/Disabilities:      Vision    Nutrition Therapy:  Current Nutrition Therapy:   - Oral Diet:  Cardiac    Routes of Feeding: Oral  Liquids: No Restrictions  Daily Fluid Restriction: none    Last Modified Barium Swallow with Video (Video Swallowing Test): not done    Treatments at the Time of Hospital Discharge:   Respiratory Treatments: ***  Oxygen Therapy:  is on oxygen at 6 L/min per nasal cannula.   Ventilator:    - No ventilator support    Rehab Therapies: Physical Therapy and Occupational Therapy  Weight Bearing Status/Restrictions: No weight bearing restirctions  Other Medical Equipment (for information only, NOT a DME order):  ***  Other Treatments: ***    Patient's personal belongings (please select all that are sent with patient):  Veronica Groves RN SIGNATURE:  Electronically signed by Celina Adams RN on 1/7/21 at 12:21 PM EST    CASE MANAGEMENT/SOCIAL WORK SECTION    Inpatient Status Date: ***    Readmission Risk Assessment Score:  Readmission Risk              Risk of Unplanned Readmission:        23           Discharging to Facility/ Agency   · Name: [facility] Emer[facility]  · [facility], [address] [place] [address] Road  · Phone:[phone]  · Fax:[phone]    Dialysis Facility (if applicable)   · Name:  · Address:  · Dialysis Schedule:  · Phone:  · Fax:    / signature: Electronically signed by MANDIE Freeman on 1/7/21 at 11:23 AM EST    PHYSICIAN SECTION    Prognosis: Good    Condition at Discharge: Stable    Rehab Potential (if transferring to Rehab): Good    Recommended Labs or Other Treatments After Discharge: None    Physician Certification: I certify the above information and transfer of Shashank Monge  is necessary for the continuing treatment of the diagnosis listed and that he requires [facility] for greater than 30 days.      Update Admission H&P: No change in H&P    PHYSICIAN SIGNATURE:  Electronically signed by Wendy Navarrete DO on 1/7/21 at 11:17 AM EST

## 2021-08-15 ENCOUNTER — APPOINTMENT (OUTPATIENT)
Dept: GENERAL RADIOLOGY | Age: 76
DRG: 552 | End: 2021-08-15
Payer: MEDICARE

## 2021-08-15 ENCOUNTER — APPOINTMENT (OUTPATIENT)
Dept: CT IMAGING | Age: 76
DRG: 552 | End: 2021-08-15
Payer: MEDICARE

## 2021-08-15 ENCOUNTER — HOSPITAL ENCOUNTER (INPATIENT)
Age: 76
LOS: 3 days | Discharge: NURSING, REHAB OR LONG-TERM CARE | DRG: 552 | End: 2021-08-18
Attending: EMERGENCY MEDICINE | Admitting: SURGERY
Payer: MEDICARE

## 2021-08-15 DIAGNOSIS — S01.411A LACERATION OF RIGHT TEMPOROMANDIBULAR AREA WITHOUT FOREIGN BODY, INITIAL ENCOUNTER: ICD-10-CM
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
## 2021-08-15 DIAGNOSIS — S12.100A TRAUMATIC CLOSED FRACTURE OF C2 VERTEBRA WITH MINIMAL DISPLACEMENT, INITIAL ENCOUNTER (HCC): Primary | ICD-10-CM

## 2021-08-15 LAB
ALBUMIN SERPL-MCNC: 3.3 G/DL (ref 3.5–5.2)
ALP BLD-CCNC: 42 U/L (ref 40–129)
ALT SERPL-CCNC: 16 U/L (ref 0–40)
AMPHETAMINE SCREEN, URINE: NOT DETECTED
ANION GAP SERPL CALCULATED.3IONS-SCNC: 11 MMOL/L (ref 7–16)
AST SERPL-CCNC: 30 U/L (ref 0–39)
BARBITURATE SCREEN URINE: NOT DETECTED
BENZODIAZEPINE SCREEN, URINE: NOT DETECTED
BILIRUB SERPL-MCNC: 0.5 MG/DL (ref 0–1.2)
BILIRUBIN DIRECT: <0.2 MG/DL (ref 0–0.3)
BILIRUBIN, INDIRECT: ABNORMAL MG/DL (ref 0–1)
BUN BLDV-MCNC: 22 MG/DL (ref 6–23)
CALCIUM SERPL-MCNC: 8.2 MG/DL (ref 8.6–10.2)
CANNABINOID SCREEN URINE: NOT DETECTED
CHLORIDE BLD-SCNC: 98 MMOL/L (ref 98–107)
CO2: 23 MMOL/L (ref 22–29)
COCAINE METABOLITE SCREEN URINE: NOT DETECTED
CREAT SERPL-MCNC: 1.2 MG/DL (ref 0.7–1.2)
EKG ATRIAL RATE: 65 BPM
EKG P AXIS: 74 DEGREES
EKG P-R INTERVAL: 204 MS
EKG Q-T INTERVAL: 418 MS
EKG QRS DURATION: 100 MS
EKG QTC CALCULATION (BAZETT): 434 MS
EKG R AXIS: -42 DEGREES
EKG T AXIS: -17 DEGREES
EKG VENTRICULAR RATE: 65 BPM
FENTANYL SCREEN, URINE: NOT DETECTED
GFR AFRICAN AMERICAN: >60
GFR NON-AFRICAN AMERICAN: 59 ML/MIN/1.73
GLUCOSE BLD-MCNC: 108 MG/DL (ref 74–99)
HCT VFR BLD CALC: 37.6 % (ref 37–54)
HEMOGLOBIN: 12.3 G/DL (ref 12.5–16.5)
LACTIC ACID: 3.4 MMOL/L (ref 0.5–2.2)
Lab: ABNORMAL
MCH RBC QN AUTO: 33.5 PG (ref 26–35)
MCHC RBC AUTO-ENTMCNC: 32.7 % (ref 32–34.5)
MCV RBC AUTO: 102.5 FL (ref 80–99.9)
METHADONE SCREEN, URINE: NOT DETECTED
OPIATE SCREEN URINE: POSITIVE
OXYCODONE URINE: NOT DETECTED
PDW BLD-RTO: 16.3 FL (ref 11.5–15)
PHENCYCLIDINE SCREEN URINE: NOT DETECTED
PLATELET # BLD: 203 E9/L (ref 130–450)
PMV BLD AUTO: 9.7 FL (ref 7–12)
POTASSIUM SERPL-SCNC: 3.9 MMOL/L (ref 3.5–5)
RBC # BLD: 3.67 E12/L (ref 3.8–5.8)
SODIUM BLD-SCNC: 132 MMOL/L (ref 132–146)
TOTAL PROTEIN: 6 G/DL (ref 6.4–8.3)
WBC # BLD: 5 E9/L (ref 4.5–11.5)

## 2021-08-15 PROCEDURE — 80307 DRUG TEST PRSMV CHEM ANLYZR: CPT

## 2021-08-15 PROCEDURE — 6360000004 HC RX CONTRAST MEDICATION: Performed by: RADIOLOGY

## 2021-08-15 PROCEDURE — 0HQ0XZZ REPAIR SCALP SKIN, EXTERNAL APPROACH: ICD-10-PCS | Performed by: STUDENT IN AN ORGANIZED HEALTH CARE EDUCATION/TRAINING PROGRAM

## 2021-08-15 PROCEDURE — 85027 COMPLETE CBC AUTOMATED: CPT

## 2021-08-15 PROCEDURE — 6360000002 HC RX W HCPCS: Performed by: STUDENT IN AN ORGANIZED HEALTH CARE EDUCATION/TRAINING PROGRAM

## 2021-08-15 PROCEDURE — 99223 1ST HOSP IP/OBS HIGH 75: CPT | Performed by: SURGERY

## 2021-08-15 PROCEDURE — 6370000000 HC RX 637 (ALT 250 FOR IP): Performed by: STUDENT IN AN ORGANIZED HEALTH CARE EDUCATION/TRAINING PROGRAM

## 2021-08-15 PROCEDURE — 71045 X-RAY EXAM CHEST 1 VIEW: CPT

## 2021-08-15 PROCEDURE — 2580000003 HC RX 258: Performed by: STUDENT IN AN ORGANIZED HEALTH CARE EDUCATION/TRAINING PROGRAM

## 2021-08-15 PROCEDURE — 6360000002 HC RX W HCPCS: Performed by: EMERGENCY MEDICINE

## 2021-08-15 PROCEDURE — 2060000000 HC ICU INTERMEDIATE R&B

## 2021-08-15 PROCEDURE — 83605 ASSAY OF LACTIC ACID: CPT

## 2021-08-15 PROCEDURE — 90714 TD VACC NO PRESV 7 YRS+ IM: CPT | Performed by: EMERGENCY MEDICINE

## 2021-08-15 PROCEDURE — 72170 X-RAY EXAM OF PELVIS: CPT

## 2021-08-15 PROCEDURE — 6360000002 HC RX W HCPCS: Performed by: SURGERY

## 2021-08-15 PROCEDURE — 80076 HEPATIC FUNCTION PANEL: CPT

## 2021-08-15 PROCEDURE — 2500000003 HC RX 250 WO HCPCS: Performed by: EMERGENCY MEDICINE

## 2021-08-15 PROCEDURE — 70450 CT HEAD/BRAIN W/O DYE: CPT

## 2021-08-15 PROCEDURE — 6370000000 HC RX 637 (ALT 250 FOR IP): Performed by: EMERGENCY MEDICINE

## 2021-08-15 PROCEDURE — 99285 EMERGENCY DEPT VISIT HI MDM: CPT

## 2021-08-15 PROCEDURE — 99233 SBSQ HOSP IP/OBS HIGH 50: CPT | Performed by: SURGERY

## 2021-08-15 PROCEDURE — 90471 IMMUNIZATION ADMIN: CPT | Performed by: EMERGENCY MEDICINE

## 2021-08-15 PROCEDURE — 93005 ELECTROCARDIOGRAM TRACING: CPT | Performed by: EMERGENCY MEDICINE

## 2021-08-15 PROCEDURE — 70498 CT ANGIOGRAPHY NECK: CPT

## 2021-08-15 PROCEDURE — 6360000002 HC RX W HCPCS

## 2021-08-15 PROCEDURE — 93010 ELECTROCARDIOGRAM REPORT: CPT | Performed by: INTERNAL MEDICINE

## 2021-08-15 PROCEDURE — 72125 CT NECK SPINE W/O DYE: CPT

## 2021-08-15 PROCEDURE — 12013 RPR F/E/E/N/L/M 2.6-5.0 CM: CPT

## 2021-08-15 PROCEDURE — 80048 BASIC METABOLIC PNL TOTAL CA: CPT

## 2021-08-15 PROCEDURE — 2580000003 HC RX 258: Performed by: SURGERY

## 2021-08-15 RX ORDER — FENTANYL CITRATE 50 UG/ML
50 INJECTION, SOLUTION INTRAMUSCULAR; INTRAVENOUS ONCE
Status: COMPLETED | OUTPATIENT
Start: 2021-08-15 | End: 2021-08-15

## 2021-08-15 RX ORDER — SODIUM CHLORIDE 0.9 % (FLUSH) 0.9 %
10 SYRINGE (ML) INJECTION PRN
Status: DISCONTINUED | OUTPATIENT
Start: 2021-08-15 | End: 2021-08-18 | Stop reason: HOSPADM

## 2021-08-15 RX ORDER — FENTANYL CITRATE 50 UG/ML
INJECTION, SOLUTION INTRAMUSCULAR; INTRAVENOUS
Status: COMPLETED
Start: 2021-08-15 | End: 2021-08-15

## 2021-08-15 RX ORDER — CARVEDILOL 6.25 MG/1
12.5 TABLET ORAL 2 TIMES DAILY WITH MEALS
Status: DISCONTINUED | OUTPATIENT
Start: 2021-08-15 | End: 2021-08-18 | Stop reason: HOSPADM

## 2021-08-15 RX ORDER — MULTIVITAMIN WITH IRON
1 TABLET ORAL DAILY
Status: DISCONTINUED | OUTPATIENT
Start: 2021-08-15 | End: 2021-08-18 | Stop reason: HOSPADM

## 2021-08-15 RX ORDER — LIDOCAINE HYDROCHLORIDE AND EPINEPHRINE 10; 10 MG/ML; UG/ML
20 INJECTION, SOLUTION INFILTRATION; PERINEURAL ONCE
Status: COMPLETED | OUTPATIENT
Start: 2021-08-15 | End: 2021-08-15

## 2021-08-15 RX ORDER — OXYCODONE HYDROCHLORIDE 5 MG/1
5 TABLET ORAL EVERY 4 HOURS PRN
Status: DISCONTINUED | OUTPATIENT
Start: 2021-08-15 | End: 2021-08-18 | Stop reason: HOSPADM

## 2021-08-15 RX ORDER — PANTOPRAZOLE SODIUM 40 MG/1
40 TABLET, DELAYED RELEASE ORAL
Status: DISCONTINUED | OUTPATIENT
Start: 2021-08-15 | End: 2021-08-18 | Stop reason: HOSPADM

## 2021-08-15 RX ORDER — 0.9 % SODIUM CHLORIDE 0.9 %
1000 INTRAVENOUS SOLUTION INTRAVENOUS ONCE
Status: COMPLETED | OUTPATIENT
Start: 2021-08-15 | End: 2021-08-15

## 2021-08-15 RX ORDER — FOLIC ACID 1 MG/1
1 TABLET ORAL DAILY
Status: DISCONTINUED | OUTPATIENT
Start: 2021-08-15 | End: 2021-08-18 | Stop reason: HOSPADM

## 2021-08-15 RX ORDER — HYDROCODONE BITARTRATE AND ACETAMINOPHEN 5; 325 MG/1; MG/1
1 TABLET ORAL ONCE
Status: COMPLETED | OUTPATIENT
Start: 2021-08-15 | End: 2021-08-15

## 2021-08-15 RX ORDER — ALLOPURINOL 300 MG/1
300 TABLET ORAL DAILY
Status: DISCONTINUED | OUTPATIENT
Start: 2021-08-15 | End: 2021-08-18 | Stop reason: HOSPADM

## 2021-08-15 RX ORDER — GAUZE BANDAGE 2" X 2"
100 BANDAGE TOPICAL DAILY
Status: DISCONTINUED | OUTPATIENT
Start: 2021-08-15 | End: 2021-08-18 | Stop reason: HOSPADM

## 2021-08-15 RX ORDER — SENNA PLUS 8.6 MG/1
1 TABLET ORAL DAILY PRN
Status: DISCONTINUED | OUTPATIENT
Start: 2021-08-15 | End: 2021-08-18 | Stop reason: HOSPADM

## 2021-08-15 RX ORDER — ONDANSETRON 2 MG/ML
4 INJECTION INTRAMUSCULAR; INTRAVENOUS EVERY 6 HOURS PRN
Status: DISCONTINUED | OUTPATIENT
Start: 2021-08-15 | End: 2021-08-18 | Stop reason: HOSPADM

## 2021-08-15 RX ORDER — SODIUM CHLORIDE 0.9 % (FLUSH) 0.9 %
5-40 SYRINGE (ML) INJECTION EVERY 12 HOURS SCHEDULED
Status: DISCONTINUED | OUTPATIENT
Start: 2021-08-15 | End: 2021-08-18 | Stop reason: HOSPADM

## 2021-08-15 RX ORDER — SODIUM CHLORIDE 9 MG/ML
25 INJECTION, SOLUTION INTRAVENOUS PRN
Status: DISCONTINUED | OUTPATIENT
Start: 2021-08-15 | End: 2021-08-18 | Stop reason: HOSPADM

## 2021-08-15 RX ORDER — ONDANSETRON 4 MG/1
4 TABLET, ORALLY DISINTEGRATING ORAL EVERY 8 HOURS PRN
Status: DISCONTINUED | OUTPATIENT
Start: 2021-08-15 | End: 2021-08-18 | Stop reason: HOSPADM

## 2021-08-15 RX ORDER — METHOCARBAMOL 500 MG/1
1000 TABLET, FILM COATED ORAL 4 TIMES DAILY
Status: DISCONTINUED | OUTPATIENT
Start: 2021-08-15 | End: 2021-08-18 | Stop reason: HOSPADM

## 2021-08-15 RX ORDER — TETANUS AND DIPHTHERIA TOXOIDS ADSORBED 2; 2 [LF]/.5ML; [LF]/.5ML
0.5 INJECTION INTRAMUSCULAR ONCE
Status: COMPLETED | OUTPATIENT
Start: 2021-08-15 | End: 2021-08-15

## 2021-08-15 RX ORDER — PHENOBARBITAL SODIUM 65 MG/ML
65 INJECTION INTRAMUSCULAR EVERY 6 HOURS SCHEDULED
Status: DISCONTINUED | OUTPATIENT
Start: 2021-08-15 | End: 2021-08-17

## 2021-08-15 RX ORDER — FENTANYL CITRATE 50 UG/ML
50 INJECTION, SOLUTION INTRAMUSCULAR; INTRAVENOUS ONCE
Status: DISCONTINUED | OUTPATIENT
Start: 2021-08-15 | End: 2021-08-15

## 2021-08-15 RX ORDER — AMLODIPINE BESYLATE 5 MG/1
5 TABLET ORAL DAILY
Status: DISCONTINUED | OUTPATIENT
Start: 2021-08-15 | End: 2021-08-18 | Stop reason: HOSPADM

## 2021-08-15 RX ORDER — POLYETHYLENE GLYCOL 3350 17 G/17G
17 POWDER, FOR SOLUTION ORAL DAILY
Status: DISCONTINUED | OUTPATIENT
Start: 2021-08-15 | End: 2021-08-18 | Stop reason: HOSPADM

## 2021-08-15 RX ORDER — OXYCODONE HYDROCHLORIDE 10 MG/1
10 TABLET ORAL EVERY 4 HOURS PRN
Status: DISCONTINUED | OUTPATIENT
Start: 2021-08-15 | End: 2021-08-18 | Stop reason: HOSPADM

## 2021-08-15 RX ORDER — SODIUM CHLORIDE 9 MG/ML
25 INJECTION, SOLUTION INTRAVENOUS PRN
Status: DISCONTINUED | OUTPATIENT
Start: 2021-08-15 | End: 2021-08-15 | Stop reason: SDUPTHER

## 2021-08-15 RX ORDER — SODIUM CHLORIDE 0.9 % (FLUSH) 0.9 %
5-40 SYRINGE (ML) INJECTION PRN
Status: DISCONTINUED | OUTPATIENT
Start: 2021-08-15 | End: 2021-08-18 | Stop reason: HOSPADM

## 2021-08-15 RX ADMIN — SODIUM CHLORIDE 1000 ML: 9 INJECTION, SOLUTION INTRAVENOUS at 06:16

## 2021-08-15 RX ADMIN — FOLIC ACID 1 MG: 1 TABLET ORAL at 09:12

## 2021-08-15 RX ADMIN — CARVEDILOL 12.5 MG: 6.25 TABLET, FILM COATED ORAL at 17:05

## 2021-08-15 RX ADMIN — FENTANYL CITRATE 50 MCG: 50 INJECTION, SOLUTION INTRAMUSCULAR; INTRAVENOUS at 03:47

## 2021-08-15 RX ADMIN — HYDROMORPHONE HYDROCHLORIDE 0.5 MG: 1 INJECTION, SOLUTION INTRAMUSCULAR; INTRAVENOUS; SUBCUTANEOUS at 09:14

## 2021-08-15 RX ADMIN — TETANUS AND DIPHTHERIA TOXOIDS ADSORBED 0.5 ML: 2; 2 INJECTION INTRAMUSCULAR at 01:00

## 2021-08-15 RX ADMIN — METHOCARBAMOL 1000 MG: 500 TABLET ORAL at 15:58

## 2021-08-15 RX ADMIN — PHENOBARBITAL SODIUM 65 MG: 65 INJECTION INTRAMUSCULAR at 17:06

## 2021-08-15 RX ADMIN — LIDOCAINE HYDROCHLORIDE AND EPINEPHRINE 20 ML: 10; 10 INJECTION, SOLUTION INFILTRATION; PERINEURAL at 00:59

## 2021-08-15 RX ADMIN — OXYCODONE HYDROCHLORIDE 10 MG: 10 TABLET ORAL at 15:58

## 2021-08-15 RX ADMIN — CARVEDILOL 12.5 MG: 6.25 TABLET, FILM COATED ORAL at 09:11

## 2021-08-15 RX ADMIN — Medication 10 ML: at 21:40

## 2021-08-15 RX ADMIN — HYDROCODONE BITARTRATE AND ACETAMINOPHEN 1 TABLET: 5; 325 TABLET ORAL at 01:32

## 2021-08-15 RX ADMIN — IOPAMIDOL 75 ML: 755 INJECTION, SOLUTION INTRAVENOUS at 02:09

## 2021-08-15 RX ADMIN — HYDROMORPHONE HYDROCHLORIDE 0.5 MG: 1 INJECTION, SOLUTION INTRAMUSCULAR; INTRAVENOUS; SUBCUTANEOUS at 20:25

## 2021-08-15 RX ADMIN — METHOCARBAMOL 1000 MG: 100 INJECTION INTRAMUSCULAR; INTRAVENOUS at 10:42

## 2021-08-15 RX ADMIN — METHOCARBAMOL 1000 MG: 500 TABLET ORAL at 21:40

## 2021-08-15 RX ADMIN — AMLODIPINE BESYLATE 5 MG: 5 TABLET ORAL at 09:12

## 2021-08-15 RX ADMIN — HYDROMORPHONE HYDROCHLORIDE 0.5 MG: 1 INJECTION, SOLUTION INTRAMUSCULAR; INTRAVENOUS; SUBCUTANEOUS at 13:24

## 2021-08-15 RX ADMIN — METHOCARBAMOL 1000 MG: 500 TABLET ORAL at 09:12

## 2021-08-15 ASSESSMENT — PAIN SCALES - GENERAL
PAINLEVEL_OUTOF10: 6
PAINLEVEL_OUTOF10: 6
PAINLEVEL_OUTOF10: 9
PAINLEVEL_OUTOF10: 10
PAINLEVEL_OUTOF10: 8
PAINLEVEL_OUTOF10: 6
PAINLEVEL_OUTOF10: 2
PAINLEVEL_OUTOF10: 10
PAINLEVEL_OUTOF10: 9
PAINLEVEL_OUTOF10: 10
PAINLEVEL_OUTOF10: 8

## 2021-08-15 ASSESSMENT — PAIN DESCRIPTION - DESCRIPTORS: DESCRIPTORS: ACHING

## 2021-08-15 ASSESSMENT — PAIN DESCRIPTION - PROGRESSION
CLINICAL_PROGRESSION: NOT CHANGED

## 2021-08-15 ASSESSMENT — PAIN - FUNCTIONAL ASSESSMENT: PAIN_FUNCTIONAL_ASSESSMENT: PREVENTS OR INTERFERES SOME ACTIVE ACTIVITIES AND ADLS

## 2021-08-15 ASSESSMENT — PAIN DESCRIPTION - FREQUENCY: FREQUENCY: CONTINUOUS

## 2021-08-15 ASSESSMENT — PAIN DESCRIPTION - LOCATION
LOCATION: HEAD
LOCATION: HEAD

## 2021-08-15 ASSESSMENT — PAIN DESCRIPTION - ONSET: ONSET: SUDDEN

## 2021-08-15 ASSESSMENT — PAIN DESCRIPTION - ORIENTATION: ORIENTATION: RIGHT

## 2021-08-15 ASSESSMENT — PAIN DESCRIPTION - PAIN TYPE: TYPE: ACUTE PAIN

## 2021-08-15 NOTE — DISCHARGE SUMMARY
Physician Discharge Summary     Patient ID:  Davon Guillen  [id number]  58 y.o.  1945    Admit date: 8/15/2021    Discharge date and time: 08/18/21      Admitting Physician: Roland Odell MD     Admission Diagnoses: Fall, initial encounter [W19. XXXA]  Traumatic closed fracture of C2 vertebra with minimal displacement, initial encounter ([facility] [address] [S12.100A]  Laceration of right temporomandibular area without foreign body, initial encounter [T11.107K]    Discharge Diagnoses: Active Problems:    Traumatic closed fracture of C2 vertebra with minimal displacement, initial encounter ([facility] [address]  Resolved Problems:    * No resolved hospital problems. *      Admission Condition: fair    Discharged Condition: stable    Indication for Admission: traumatic fracture of C2    Hospital Course/Procedures/Operation/treatments:   8/14:Trauma consult. Injury occurred earlier this night. Patient has been drinking alcohol this evening and sustained a fall. He does not remember the details. Unsure how far he fell or if he lost consciousness. A 4cm laceration is present on his forehead which was sutured in the ED. He takes Eliquis for A fib. No past surgical history. He reports drinking 3-4 drinks per day. Patient is GCS 14 for confusion. No neuro defecits  8/15: Nothing new on tertiary. Vascular is consulted aspirin when tolerating. C2 fracture. Patient's only complaint is neck and some head pain associated with the cervical fracture and head laceration. On Eliquis but no signs of bleeding thus we will just stopped, without reversing. Patient in aspirin, neuro muscular and vascularly intact. Laceration on forehead is repaired. 8/16: No acute events overnight. Waiting on PT/OT evaluation. 8/17: Resting comfortably. C/o significant pain to neck. C-collar in place. [address].   8/18: Feeling better today. Pain better controlled. Tolerating diet and voiding appropriately.  PNB level pending        Consults:   IP CONSULT TO TRAUMA SURGERY  IP CONSULT TO NEUROSURGERY  IP CONSULT TO VASCULAR SURGERY  IP CONSULT TO SOCIAL WORK  INPATIENT CONSULT TO ORTHOTIST/PROSTHETIST  IP CONSULT TO SOCIAL WORK    Significant Diagnostic Studies:   XR PELVIS (1-2 VIEWS)    Result Date: 8/15/2021  EXAMINATION: ONE XRAY VIEW OF THE PELVIS 8/15/2021 3:46 am COMPARISON: 12/28/2020 HISTORY: ORDERING SYSTEM PROVIDED HISTORY: cxr TECHNOLOGIST PROVIDED HISTORY: Reason for exam:->cxr What reading provider will be dictating this exam?->CRC History of injury with cervical fracture. FINDINGS: Contrast is present within the urinary bladder. Calcifications in the pelvis are most consistent with phleboliths. Moderate degenerative changes of the visualized lumbar spine, pelvis and hips. Scattered vascular calcifications. No bony destruction, dislocation or acute fracture identified. No acute bony abnormality. Chronic appearing findings. MRI or CT would be useful if symptoms persist.     CT Head WO Contrast    Result Date: 8/15/2021  EXAMINATION: CT OF THE HEAD WITHOUT CONTRAST  8/15/2021 1:38 am TECHNIQUE: CT of the head was performed without the administration of intravenous contrast. Dose modulation, iterative reconstruction, and/or weight based adjustment of the mA/kV was utilized to reduce the radiation dose to as low as reasonably achievable. COMPARISON: None. HISTORY: ORDERING SYSTEM PROVIDED HISTORY: Fall abrasion FINDINGS: BRAIN/VENTRICLES: There is no acute intracranial hemorrhage, mass effect or midline shift. No abnormal extra-axial fluid collection. The gray-white differentiation is maintained without evidence of an acute infarct. There is prominence of the ventricles and sulci due to global parenchymal volume loss. There are nonspecific areas of hypoattenuation within the periventricular and subcortical white matter, which likely represent chronic microvascular ischemic change.  ORBITS: The visualized portion of the orbits demonstrate no acute abnormality. SINUSES: The visualized paranasal sinuses and mastoid air cells demonstrate no acute abnormality. SOFT TISSUES/SKULL: No acute abnormality of the visualized skull or soft tissues. No acute intracranial abnormality. CT Cervical Spine WO Contrast    Result Date: 8/15/2021  EXAMINATION: CTA OF THE NECK; CT OF THE CERVICAL SPINE WITHOUT CONTRAST 8/15/2021 2:02 am; 8/15/2021 1:38 am TECHNIQUE: CTA of the neck was performed with the administration of intravenous contrast. Multiplanar reformatted images are provided for review. MIP images are provided for review. Stenosis of the internal carotid arteries measured using NASCET criteria. Dose modulation, iterative reconstruction, and/or weight based adjustment of the mA/kV was utilized to reduce the radiation dose to as low as reasonably achievable.; CT of the cervical spine was performed without the administration of intravenous contrast. Multiplanar reformatted images are provided for review. Dose modulation, iterative reconstruction, and/or weight based adjustment of the mA/kV was utilized to reduce the radiation dose to as low as reasonably achievable. COMPARISON: None. HISTORY: ORDERING SYSTEM PROVIDED HISTORY: Cervical fracture FINDINGS: AORTIC ARCH/ARCH VESSELS: No dissection or arterial injury. No significant stenosis of the brachiocephalic or subclavian arteries. CAROTID ARTERIES: Atherosclerosis in bilateral carotid bifurcations resulting in less than 50% stenosis in the origin of right ICA and 50% stenosis in the origin of left ICA. VERTEBRAL ARTERIES: Mild narrowing of the V3 segment of the right vertebral artery. Left vertebral artery is unremarkable. SOFT TISSUES: The lung apices are clear. No cervical or superior mediastinal lymphadenopathy. The larynx and pharynx are unremarkable. No acute abnormality of the salivary and thyroid glands. BONES: Acute type 2 fracture of the odontoid process of C2.   There are fractures through the bilateral lamina of C2. Mild anterolisthesis of C4 on C5 likely degenerative in nature. Moderately advanced multilevel degenerative changes are present. Partial fusion at C5-C6. Acute type 2 fracture of the odontoid process of C2. There are fractures through the bilateral lamina of C2. Mild narrowing of the V3 segment of the right vertebral artery suggestive of low-grade injury. Atherosclerosis in bilateral carotid bifurcations resulting in less than 50% stenosis in the origin of right ICA and 50% stenosis in the origin of left ICA. XR CHEST PORTABLE    Result Date: 8/15/2021  EXAMINATION: ONE XRAY VIEW OF THE CHEST 8/15/2021 3:46 am COMPARISON: 01/07/2021 HISTORY: ORDERING SYSTEM PROVIDED HISTORY: trauma TECHNOLOGIST PROVIDED HISTORY: Reason for exam:->trauma What reading provider will be dictating this exam?->CRC FINDINGS: EKG leads overlie the chest.  Cardiac silhouette is enlarged likely due to cardiomegaly. Scattered vascular calcifications. No pneumothorax identified. Probable mild vascular congestion. Scattered areas of scarring and/or atelectasis in the lung bases. Early infiltrate from pneumonia at the right base not excluded. No definite effusion. Mild vascular congestion. Scattered areas of scarring and/or atelectasis in the lung bases. Developing infiltrate from pneumonia at the right base not excluded. Continued follow-up recommended. CTA NECK W CONTRAST    Result Date: 8/15/2021  EXAMINATION: CTA OF THE NECK; CT OF THE CERVICAL SPINE WITHOUT CONTRAST 8/15/2021 2:02 am; 8/15/2021 1:38 am TECHNIQUE: CTA of the neck was performed with the administration of intravenous contrast. Multiplanar reformatted images are provided for review. MIP images are provided for review. Stenosis of the internal carotid arteries measured using NASCET criteria.  Dose modulation, iterative reconstruction, and/or weight based adjustment of the mA/kV was utilized to reduce the radiation dose to as low as reasonably achievable.; CT of the cervical spine was performed without the administration of intravenous contrast. Multiplanar reformatted images are provided for review. Dose modulation, iterative reconstruction, and/or weight based adjustment of the mA/kV was utilized to reduce the radiation dose to as low as reasonably achievable. COMPARISON: None. HISTORY: ORDERING SYSTEM PROVIDED HISTORY: Cervical fracture FINDINGS: AORTIC ARCH/ARCH VESSELS: No dissection or arterial injury. No significant stenosis of the brachiocephalic or subclavian arteries. CAROTID ARTERIES: Atherosclerosis in bilateral carotid bifurcations resulting in less than 50% stenosis in the origin of right ICA and 50% stenosis in the origin of left ICA. VERTEBRAL ARTERIES: Mild narrowing of the V3 segment of the right vertebral artery. Left vertebral artery is unremarkable. SOFT TISSUES: The lung apices are clear. No cervical or superior mediastinal lymphadenopathy. The larynx and pharynx are unremarkable. No acute abnormality of the salivary and thyroid glands. BONES: Acute type 2 fracture of the odontoid process of C2. There are fractures through the bilateral lamina of C2. Mild anterolisthesis of C4 on C5 likely degenerative in nature. Moderately advanced multilevel degenerative changes are present. Partial fusion at C5-C6. Acute type 2 fracture of the odontoid process of C2. There are fractures through the bilateral lamina of C2. Mild narrowing of the V3 segment of the right vertebral artery suggestive of low-grade injury. Atherosclerosis in bilateral carotid bifurcations resulting in less than 50% stenosis in the origin of right ICA and 50% stenosis in the origin of left ICA. Discharge Exam:  Patient reports ongoing neck pain; states he is tolerating a diet and passing flatus.   Had a BM.     Awake and alert  Follows commands  Heart: Irregular  Lungs: Fairly clear bilaterally  Abdomen: Soft; bowel sounds active; nontender/nondistended  Skin: Warm/dry; sutures to scalp laceration  Extremities: Strength 5 out of 5 bilateral upper and lower extremities  C-collar in place    Disposition: SNF    In process/preliminary results:  Outstanding Order Results     Date and Time Order Name Status Description    8/18/2021 10:59 AM XR CERVICAL SPINE (2-3 VIEWS) In process           Patient Instructions:   Current Discharge Medication List           Details   aspirin 81 MG chewable tablet Take 1 tablet by mouth daily  Qty: 30 tablet, Refills: 3      senna (SENOKOT) 8.6 MG tablet Take 1 tablet by mouth daily as needed (Constipation)      oxyCODONE HCl (OXY-IR) 10 MG immediate release tablet Take 1 tablet by mouth every 4 hours as needed for Pain for up to 5 days.   Refills: 0    Comments: Reduce doses taken as pain becomes manageable  Associated Diagnoses: Traumatic closed fracture of C2 vertebra with minimal displacement, initial encounter ([facility])      methocarbamol (ROBAXIN) 500 MG tablet Take 2 tablets by mouth 4 times daily for 10 days  Qty: 80 tablet, Refills: 0              Details   carvedilol (COREG) 12.5 MG tablet Take 1 tablet by mouth 2 times daily (with meals)  Qty: 60 tablet, Refills: 0      thiamine 100 MG tablet Take 1 tablet by mouth daily  Qty: 30 tablet, Refills: 0      pantoprazole (PROTONIX) 40 MG tablet Take 1 tablet by mouth every morning (before breakfast)  Qty: 30 tablet, Refills: 0      Multiple Vitamin (MULTIVITAMIN) TABS tablet Take 1 tablet by mouth daily  Qty: 30 tablet, Refills: 0      clopidogrel (PLAVIX) 75 MG tablet Take 1 tablet by mouth daily  Qty: 30 tablet, Refills: 0      magnesium oxide (MAG-OX) 400 (241.3 Mg) MG TABS tablet Take 1 tablet by mouth daily  Qty: 30 tablet, Refills: 0      folic acid (FOLVITE) 1 MG tablet Take 1 tablet by mouth daily  Qty: 30 tablet, Refills: 0      amLODIPine (NORVASC) 5 MG tablet Take 1 tablet by mouth daily  Qty: 30 tablet, Refills: 0      allopurinol (ZYLOPRIM) 300 MG tablet Take 300 mg by mouth daily      loperamide (IMODIUM) 2 MG capsule Take 2 mg by mouth every 4-6 hours as needed for Diarrhea                Broken Neck: Care Instructions  Your Care Instructions     A broken neck can range from a small, hairline crack, to a bone or bones breaking into two or more pieces. Treatment for a broken neck depends on how bad the break is and which bones are involved. You may be sent home with a neck brace or collar. You can help your neck heal with care at home. You heal best when you take good care of yourself. Eat a variety of healthy foods, and don't smoke. Follow-up care is a key part of your treatment and safety. Be sure to make and go to all appointments, and call your doctor if you are having problems. It's also a good idea to know your test results and keep a list of the medicines you take. How can you care for yourself at home? · If you were fitted for a neck brace, wear it exactly as directed by your doctor. Do not take it off until your doctor tells you to. · Be safe with medicines. Take pain medicines exactly as directed. ? If the doctor gave you a prescription medicine for pain, take it as prescribed. ? If you are not taking a prescription pain medicine, ask your doctor if you can take an over-the-counter medicine. · Follow your doctor's directions for returning to your normal activities. · Do any exercises that you are given to keep your muscles strong and reduce stiffness. · You can try using heat or ice to see if it helps. ? Try using a heating pad on a low or medium setting for 15 to 20 minutes every 2 to 3 hours. Try a warm shower in place of one session with the heating pad. You can also buy single-use heat wraps that last up to 8 hours. ? You can also try an ice pack for 10 to 15 minutes every 2 to 3 hours. · Make sure that paths in your home are clear so that you do not fall.  Also make sure that lighting is good and that carpets are tacked down to prevent tripping. · Do not drive unless your doctor says that it is okay. If you are allowed to drive, always wear a seat belt. · Talk to your doctor about medicines or changes in your diet that can help make your bones stronger. When should you call for help? Call 911 anytime you think you may need emergency care. For example, call if:    · You are unable to move an arm or a leg at all. Call your doctor now or seek immediate medical care if:    · You have new or worse symptoms in your arms, legs, belly, or buttocks. Symptoms may include:  ? Numbness or tingling. ? Weakness. ? Pain.     · You lose bladder or bowel control. Watch closely for changes in your health, and be sure to contact your doctor if:    · You are not getting better as expected. Where can you learn more? Go to https://[employer]. org and sign in to your [employer] account. Enter U000 in the easyfolio box to learn more about \"Broken Neck: Care Instructions. \"     If you do not have an account, please click on the \"Sign Up Now\" link. Current as of: November 16, 2020               Content Version: 12.9  © 3550-9710 [employer]. Care instructions adapted under license by [facility] ([facility]). If you have questions about a medical condition or this instruction, always ask your healthcare professional. [place] [address] any warranty or liability for your use of this information. TRAUMA SERVICES DISCHARGE INSTRUCTIONS    Call [phone], option 2, for any questions/concerns and for follow-up appointment in 1 week after discharge from facility    Please follow the instructions checked below:  Please follow-up with your primary care provider.     ACTIVITY INSTRUCTIONS  Increase activity as tolerated  No heavy lifting or strenuous activity  Take your incentive spirometer home and use 4-6 times/day   [x]  No driving until cleared by neurosurgery    WOUND/DRESSING INSTRUCTIONS:  You may medication. WORK:  You may not return to work until you receive follow-up with the Trauma Clinic or clearance by all consultants. Call the trauma clinic for any of the following or for questions/concerns;  --fever over 101F  --redness, swelling, hardness or warmth at the wound site(s). --Unrelieved nausea/vomiting  --Foul smelling or cloudy drainage at the wound site(s)  --Unrelieved pain or increase in pain  --Increase in shortness of breath      SPECIAL CONSIDERATIONS FOR OUR PATIENTS OVER THE AGE OF 65Y    Getting around your home safely can be a challenge if you have injuries or health problems that make it easy for you to fall. Loose rugs and furniture in walkways are among the dangers for many older people who have problems walking or who have poor eyesight. People who have conditions such as arthritis, osteoporosis, or dementia also must be careful not to fall. You can make your home safer with a few simple measures. Follow-up care is a key part of your treatment and safety. Be sure to make and go to all appointments, and call your doctor or nurse call line if you are having problems. It's also a good idea to know your test results and keep a list of the medicines you take. How can you care for yourself at home? Taking care of yourself   You may get dizzy if you do not drink enough water. To prevent dehydration, drink plenty of fluids, enough so that your urine is light yellow or clear like water. Choose water and other caffeine-free clear liquids. If you have kidney, heart, or liver disease and have to limit fluids, talk with your doctor before you increase the amount of fluids you drink.  Exercise regularly to improve your strength, muscle tone, and balance. Walk if you can. Swimming may be a good choice if you cannot walk easily.  Have your vision and hearing checked each year or any time you notice a change.  If you have trouble seeing and hearing, you might not be able to avoid objects and could lose your balance.  Know the side effects of the medicines you take. Ask your doctor or pharmacist whether the medicines you take can affect your balance. Sleeping pills or sedatives can affect your balance.  Limit the amount of alcohol you drink. Alcohol can impair your balance and other senses.  Ask your doctor whether calluses or corns on your feet need to be removed. If you wear loose-fitting shoes because of calluses or corns, you can lose your balance and fall.  Talk to your doctor if you have numbness in your feet. Preventing falls at home   Remove raised doorway thresholds, throw rugs, and clutter. Repair loose carpet or raised areas in the floor. [facility] furniture and electrical cords to keep them out of walking paths.  Use non-skid floor wax, and wipe up spills right away, especially on ceramic tile floors.  If you use a walker or cane, put rubber tips on it. If you use crutches, clean the bottoms of them regularly with an abrasive pad, such as steel wool.  Keep your house well lit, especially [place], and outside walkways. Use night-lights in areas such as hallways and washrooms. Add extra light switches or use remote switches (such as switches that go on or off when you clap your hands) to make it easier to turn lights on if you have to get up during the night.  Install sturdy handrails on stairways.  Move items in your cabinets so that the things you use a lot are on the lower shelves (about waist level).  Keep a cordless phone and a flashlight with new batteries by your bed. If possible, put a phone in each of the main rooms of your house, or carry a cell phone in case you fall and cannot reach a phone. Or, you can wear a device around your neck or wrist. You push a button that sends a signal for help.  Wear low-heeled shoes that fit well and give your feet good support. Use footwear with non-skid soles.  Check the heels and soles of your shoes for wear. Repair or replace worn heels or soles.  Do not wear socks without shoes on wood floors.  Walk on the grass when the sidewalks are slippery. If you live in an area that gets snow and ice in the winter, sprinkle salt on slippery steps and sidewalks. Preventing falls in the bath   Install grab bars and non-skid mats inside and outside your shower or tub and near the toilet and sinks.  Use shower chairs and bath benches.  Use a hand-held shower head that will allow you to sit while showering.  Get into a tub or shower by putting the weaker leg in first. Get out of a tub or shower with your strong side first.   Repair loose toilet seats and consider installing a raised toilet seat to make getting on and off the toilet easier.  Keep your washroom door unlocked while you are in the shower. Follow up:   Reva Bajwa MD  [address] [place] [address]. [facility] [address]  [phone]    Schedule an appointment as soon as possible for a visit in 1 month  follow up with results of a repeat CTA of the neck    [address] [place] [address] [phone]  Schedule an appointment as soon as possible for a visit in 1 week  hosptial follow-up    Lev Toth MD  [address] [place] [address].   [facility] [address]  [phone]    In 4 weeks  cervical x-ray and hospital follow-up in the office    [facility] [place] [address] [address]           Signed:  ROSALBA Quintana NP  8/18/2021  3:04 PM

## 2021-08-15 NOTE — PROGRESS NOTES
Patient says he has no elbow pain whatsoever and appears to have full range of motion with both elbows, no visible abrasions or lacerations, patient refused xrays saying he did not need them.

## 2021-08-15 NOTE — ED PROVIDER NOTES
HPI:  8/15/21, Time: 12:26 AM EDT         Merlinda Skiff is a 66 y.o. male presenting to the ED for fall while at home and its alcohol use, beginning 1 hour ago. The complaint has been persistent, moderate in severity, and worsened by nothing. Patient is a daily alcohol use states he was drinking tonight. Lorette Degree forward hitting the anterior right forehead large abrasion on the right forehead that is bleeding. Patient reports taking Eliquis chronically for atrial fibrillation. Denies any midline cervical thoracic or lumbar tenderness. Patient denies fever/chills, sore throat, cough, congestion, chest pain, shortness of breath, edema, headache, visual disturbances, focal paresthesias, focal weakness, abdominal pain, nausea, vomiting, diarrhea, constipation, dysuria, hematuria, neck or back pain, rash or other complaints. ROS:   A complete review of systems was performed and all pertinent positives and negatives are stated within HPI, all other systems reviewed and are negative.      --------------------------------------------- PAST HISTORY ---------------------------------------------  Past Medical History:  has a past medical history of CHF (congestive heart failure) (HCC) and Gout. Past Surgical History:  has a past surgical history that includes Insert Picc Cath, 5/> Yrs (1/2/2021). Social History:  reports that he has never smoked. He has never used smokeless tobacco. He reports current alcohol use. He reports that he does not use drugs. Family History: family history includes Breast Cancer in his mother; No Known Problems in his brother, sister, and sister. The patients home medications have been reviewed.     Allergies: Levofloxacin        ----------------------------------------PHYSICAL EXAM--------------------------------------  Constitutional:  Well developed, well nourished, no acute distress, non-toxic appearance   Eyes:  PERRL, conjunctiva normal, EOMI  HENT:  Atraumatic, external ears normal, nose normal, oropharynx moist, no pharyngeal exudates. Neck- normal range of motion, no nuchal rigidity   Respiratory:  No respiratory distress, normal breath sounds, no rales, no wheezing   Cardiovascular:  Normal rate, normal rhythm, no murmurs, no gallops, no rubs. Radial and DP pulses 2+ bilaterally. GI:  Soft, nondistended, normal bowel sounds, nontender, no organomegaly, no mass, no rebound, no guarding   :  No costovertebral angle tenderness   Musculoskeletal:  No edema, no tenderness, no deformities. Back- no tenderness, no cervical midline tenderness. Integument:  Well hydrated, no rash. Adequate perfusion. Lymphatic:  No cervical lymphadenopathy noted   Neurologic:  Alert & oriented x 3, CN 2-12 normal, normal motor function, normal sensory function, no focal deficits noted. Normal gait. Psychiatric:  Speech and behavior appropriate       -------------------------------------------------- RESULTS -------------------------------------------------  I have personally reviewed all laboratory and imaging results for this patient. Results are listed below.      LABS:  Results for orders placed or performed during the hospital encounter of 16/44/60   Basic metabolic panel   Result Value Ref Range    Sodium 132 132 - 146 mmol/L    Potassium 3.9 3.5 - 5.0 mmol/L    Chloride 98 98 - 107 mmol/L    CO2 23 22 - 29 mmol/L    Anion Gap 11 7 - 16 mmol/L    Glucose 108 (H) 74 - 99 mg/dL    BUN 22 6 - 23 mg/dL    CREATININE 1.2 0.7 - 1.2 mg/dL    GFR Non-African American 59 >=60 mL/min/1.73    GFR African American >60     Calcium 8.2 (L) 8.6 - 10.2 mg/dL   CBC   Result Value Ref Range    WBC 5.0 4.5 - 11.5 E9/L    RBC 3.67 (L) 3.80 - 5.80 E12/L    Hemoglobin 12.3 (L) 12.5 - 16.5 g/dL    Hematocrit 37.6 37.0 - 54.0 %    .5 (H) 80.0 - 99.9 fL    MCH 33.5 26.0 - 35.0 pg    MCHC 32.7 32.0 - 34.5 %    RDW 16.3 (H) 11.5 - 15.0 fL    Platelets 614 529 - 034 E9/L    MPV 9.7 7.0 - 12.0 fL Lactic acid, plasma   Result Value Ref Range    Lactic Acid 3.4 (H) 0.5 - 2.2 mmol/L   URINE DRUG SCREEN   Result Value Ref Range    Amphetamine Screen, Urine NOT DETECTED Negative <1000 ng/mL    Barbiturate Screen, Ur NOT DETECTED Negative < 200 ng/mL    Benzodiazepine Screen, Urine NOT DETECTED Negative < 200 ng/mL    Cannabinoid Scrn, Ur NOT DETECTED Negative < 50ng/mL    Cocaine Metabolite Screen, Urine NOT DETECTED Negative < 300 ng/mL    Opiate Scrn, Ur POSITIVE (A) Negative < 300ng/mL    PCP Screen, Urine NOT DETECTED Negative < 25 ng/mL    Methadone Screen, Urine NOT DETECTED Negative <300 ng/mL    Oxycodone Urine NOT DETECTED Negative <100 ng/mL    FENTANYL SCREEN, URINE NOT DETECTED Negative <1 ng/mL    Drug Screen Comment: see below    Hepatic Function Panel   Result Value Ref Range    Total Protein 6.0 (L) 6.4 - 8.3 g/dL    Albumin 3.3 (L) 3.5 - 5.2 g/dL    Alkaline Phosphatase 42 40 - 129 U/L    ALT 16 0 - 40 U/L    AST 30 0 - 39 U/L    Total Bilirubin 0.5 0.0 - 1.2 mg/dL    Bilirubin, Direct <0.2 0.0 - 0.3 mg/dL    Bilirubin, Indirect see below 0.0 - 1.0 mg/dL   EKG 12 Lead   Result Value Ref Range    Ventricular Rate 65 BPM    Atrial Rate 65 BPM    P-R Interval 204 ms    QRS Duration 100 ms    Q-T Interval 418 ms    QTc Calculation (Bazett) 434 ms    P Axis 74 degrees    R Axis -42 degrees    T Axis -17 degrees       RADIOLOGY:  Interpreted by Radiologist.  XR PELVIS (1-2 VIEWS)   Final Result   No acute bony abnormality. Chronic appearing findings. MRI or CT would be   useful if symptoms persist.         XR CHEST PORTABLE   Final Result   Mild vascular congestion. Scattered areas of scarring and/or atelectasis in the lung bases. Developing   infiltrate from pneumonia at the right base not excluded. Continued   follow-up recommended. CTA NECK W CONTRAST   Final Result   Acute type 2 fracture of the odontoid process of C2. There are fractures   through the bilateral lamina of C2. Mild narrowing of the V3 segment of the right vertebral artery suggestive of   low-grade injury. Atherosclerosis in bilateral carotid bifurcations resulting in less than 50%   stenosis in the origin of right ICA and 50% stenosis in the origin of left   ICA. CT Head WO Contrast   Final Result   No acute intracranial abnormality. CT Cervical Spine WO Contrast   Final Result   Acute type 2 fracture of the odontoid process of C2. There are fractures   through the bilateral lamina of C2. Mild narrowing of the V3 segment of the right vertebral artery suggestive of   low-grade injury. Atherosclerosis in bilateral carotid bifurcations resulting in less than 50%   stenosis in the origin of right ICA and 50% stenosis in the origin of left   ICA. XR ELBOW LEFT (MIN 3 VIEWS)    (Results Pending)           ------------------------- NURSING NOTES AND VITALS REVIEWED ---------------------------  The nursing notes within the ED encounter and vital signs as below have been reviewed by myself. BP (!) 169/96   Pulse 75   Temp 97.5 °F (36.4 °C)   Resp 18   Ht 6' (1.829 m)   Wt 200 lb (90.7 kg)   SpO2 99%   BMI 27.12 kg/m²   Oxygen Saturation Interpretation: Normal      The patients available past medical records and past encounters were reviewed.         ------------------------------ ED COURSE/MEDICAL DECISION MAKING----------------------  Medications   sodium chloride flush 0.9 % injection 10 mL (has no administration in time range)   allopurinol (ZYLOPRIM) tablet 300 mg (has no administration in time range)   amLODIPine (NORVASC) tablet 5 mg (has no administration in time range)   carvedilol (COREG) tablet 12.5 mg (has no administration in time range)   folic acid (FOLVITE) tablet 1 mg (has no administration in time range)   thiamine mononitrate tablet 100 mg (has no administration in time range)   pantoprazole (PROTONIX) tablet 40 mg (has no administration in time range) multivitamin 1 tablet (has no administration in time range)   sodium chloride flush 0.9 % injection 5-40 mL (has no administration in time range)   sodium chloride flush 0.9 % injection 5-40 mL (has no administration in time range)   0.9 % sodium chloride infusion (has no administration in time range)   ondansetron (ZOFRAN-ODT) disintegrating tablet 4 mg (has no administration in time range)     Or   ondansetron (ZOFRAN) injection 4 mg (has no administration in time range)   polyethylene glycol (GLYCOLAX) packet 17 g (has no administration in time range)   senna (SENOKOT) tablet 8.6 mg (has no administration in time range)   HYDROmorphone (DILAUDID) injection 0.5 mg (has no administration in time range)   0.9 % sodium chloride bolus (1,000 mLs Intravenous New Bag 8/15/21 0616)   lidocaine-EPINEPHrine 1 %-1:[id number] injection 20 mL (20 mLs Intradermal Given 8/15/21 0059)   diptheria-tetanus toxoids (DECAVAC) 2-2 LF/0.5ML injection 0.5 mL (0.5 mLs Intramuscular Given 8/15/21 0100)   HYDROcodone-acetaminophen (NORCO) 5-325 MG per tablet 1 tablet (1 tablet Oral Given 8/15/21 0132)   iopamidol (ISOVUE-370) 76 % injection 75 mL (75 mLs Intravenous Given 8/15/21 0209)   fentaNYL (SUBLIMAZE) injection 50 mcg (50 mcg Intravenous Given 8/15/21 0347)          MEDICATIONS  New Prescriptions    No medications on file     Procedure:    LACERATION REPAIR  PROCEDURE NOTE:  Unless otherwise indicated, this procedure was done or directly supervised by the ED attending. Laceration #: 1. Location: Right lateral forehead  Length: 4 cm. The wound area was irrigated with sterile water, cleansend with shur-clens and draped in a sterile fashion. The wound area was anesthetized with Lidocaine 1% with epinephrine. WOUND COMPLEXITY:    Debridement: None. Undermining: None. Wound Margins Revised: None. Flaps Aligned: yes. The wound was explored with the following results no foreign body or tendon injury seen.   The wound was closed with 5-0 Ethilon using interrupted sutures. Dressing:  a sterile dressing, a bandage and tape was placed. Total number suture: 7        Medical Decision Making:    Patient is a 57-year-old male presenting after mechanical fall at home. Patient does admit to alcohol use. CT of the head found no acute abnormalities however CT of the neck did find a C3 fracture. Trauma surgery was consulted regarding the patient. The spine was placed on the patient and trauma surgery evaluated him. Patient continued to decline any C-spine pain or weakness. Upon their evaluation patient was admitted to the trauma service for further evaluation for his ongoing cervical fracture. Also had a laceration to the right anterior forehead. This laceration was cleaned explored for foreign body and sutured. Laceration did close well. Patient tolerated treatment well. Patient stated his tetanus was at a day and his tetanus was updated. Patient will be admitted to the trauma service. This patient's ED course included: a personal history and physicial examination, re-evaluation prior to disposition, multiple bedside re-evaluations, IV medications and a personal history and physicial eaxmination    This patient has remained hemodynamically stable and been closely monitored during their ED course. Re-Evaluations:  Time: 0300   Re-evaluation. Patients symptoms show no change  Repeat physical examination is not changed      Consultations:   Spoke with Trauma Surgery, They will come to the ED and evaluate the patient. Counseling: The emergency provider has spoken with the patient and discussed todays results, in addition to providing specific details for the plan of care and counseling regarding the diagnosis and prognosis.   Questions are answered at this time and they are agreeable with the plan.    --------------------------- IMPRESSION AND DISPOSITION ---------------------------------    IMPRESSION  1. Traumatic closed fracture of C2 vertebra with minimal displacement, initial encounter ([address]    2. Fall, initial encounter    3.  Laceration of right temporomandibular area without foreign body, initial encounter        DISPOSITION  Disposition: Admit to telemetry  Patient condition is fair              Glen Cabello DO  Resident  08/15/21 [id number]

## 2021-08-15 NOTE — CONSULTS
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 are present. Partial fusion at C5-C6. Acute type 2 fracture of the odontoid process of C2. There are fractures through the bilateral lamina of C2. Mild narrowing of the V3 segment of the right vertebral artery suggestive of low-grade injury. Atherosclerosis in bilateral carotid bifurcations resulting in less than 50% stenosis in the origin of right ICA and 50% stenosis in the origin of left ICA. Assesment/Plan  66 y.o. male with trauma fall, suffered laceration to right forehead    CTA reviewed   Mild V3 injury   ASA when able  No acute surgical intervention needed a this time    Gus Retana DO  8/15/21  7:59 AM EDT    This patient was seen and examined in the emergency room. Right now he is in a c-collar. He otherwise is alert answers questions appropriately currently in no acute distress. His vascular examination of his upper and lower extremities demonstrate palpable pulses. His neurological examination is equal and symmetric in the upper and lower extremities. I have personally reviewed the CT scan. This may be an injury but the vessel appears to be patent. I recommend antiplatelet therapy. He can follow-up with us upon discharge.   I would suggest repeating the CT scan in 1 suggestion.      In terms of past drug use, he has experiment with marijuana in his high school years but denies any use of marijuana since then. He also had a period of drinking excessively in his 20s, possibly for  an year or so when he was living with friends and his mother brought him home because he was drinking excessively. He denies any recent excessive alcohol use. He has not had any formal chemical dependency treatments. He denies any recent use of cocaine, LSD, PCP, mushrooms, methamphetamine, heroin. His drug screen was only positive for amphetamines as would be expected with Vyvanse. He denies any addiction to benzodiazepines or pain medications.       HOSPITAL COURSE:  During this brief hospitalization, Marcelino demonstrated a notable improvement in his overall psychotic symptoms. These were perhaps not completely resolved by the time of discharge but he was showing the capacity to challenge these delusions, was not focused on being under surveillance from his electronic devices as he was at the time of initial evaluation. It seems that the addition of Seroquel has been beneficial not only for sleep but also for his overall psychotic symptoms which were likely induced by him \"doubling up\" on the dose of his Vyvanse starting on February 19, 2018. His mother had reported that he had continued to do this up until February 22nd or  23rd. He claims that he does not remember using more of his Vyvanse and has maintained that this was not with suicidal intent. He also denies any intent of abusing the Vyvanse. He is taking the Vyvanse reliably for a number of years, however, during the course of this hospitalization the writer was able to establish that the patient has had notable hypomanic symptoms in the past as periods of increased activity, increased creativity with his painting and writing, being more engaged in conversation and social anf alternating with more pronounced and longer periods of depression when  he has difficulty even doing chores around the house or attending to personal hygiene. This was based on conversation with both the patient and collateral information from his mother. Also the patient has a long-standing history of social anxiety disorder, panic disorder and agoraphobia. He has basically been isolating at home, has limited social support and has not really been seeking any psychiatric care or psychotherapy. Also supportive of the diagnoses of bipolar disorder is the fact that a diagnosis of bipolar disorder was actually assigned to him starting that was changed to ADHD somewhere along the way for unclear reasons. His primary care provider has basically been refilling his Vyvanse and he has not had any other specific interventions.    Initially, the patient was on a combination of trazodone and Seroquel but reported perhaps excessive sedation with this and a day prior to discharge, the trazodone was discontinued and the dose of Seroquel was increased to 100 mg at bedtime. On the day of discharge, he was reporting that he had slept well, looked more relaxed and relatively more animated in conversation, asked appropriate questions related to the discharge plan and did not seem as preoccupied with the delusions of being under surveillance or general paranoia that he was displaying at admission. He has maintained that he was not having any auditory or visual hallucinations. He is denying any suicidal or violent thoughts directed at others as well as any homicidal ideation. Verbalizes understanding of the plan of continued psychiatric follow-up, referral for psychotherapy and indicates that he plans to remain compliant with medications. He did have some transient lightheadedness the day prior to discharge with increasing dose of Seroquel but did not have any falls. The writer discussed getting up slowly if he is lying down or sitting and preferably with support. He was also encouraged to improve  month.  This was discussed with the trauma attending and the resident staff.     Harjinder Lindsay MD hydration.        DISCHARGE MEDICATIONS:     Summary of your Discharge Medications      Take these Medications      Details   calcium carbonate-vitamin D 600-400 MG-UNIT per tablet  Commonly known as:  CALTRATE+D   Take 1 tablet by mouth 2 times daily (with meals).     QUEtiapine 100 MG tablet  Commonly known as:  SEROquel   Take 1 tablet by mouth at bedtime.             REVIEW OF SYSTEMS:  Eye Problem(s):negative  ENT Problem(s):negative  Cardiovascular problem(s): Transient lightheadedness with the increasing dose of Seroquel yesterday.  Respiratory problem(s):negative  Gastro-intestinal problem(s):negative GI  Genito-urinary problem(s):negative  Musculoskeletal problem(s):negative  Integumentary problem(s):negative  Neurological problem(s):negative  Psychiatric problem(s): As noted above the patient is reporting improved mood today and less focus on the delusions about being under surveillance from his electronics, future oriented on the whole and denying any suicidal ideation.   Endocrine problem(s):negative  Hematologic and/or Lymphatic problem(s):negative        ALLERGIES:   ALLERGIES:  No Known Allergies      LABS:  Component      Latest Ref Rng & Units 2/22/2018   WBC      4.2 - 11.0 K/mcL    RBC      4.50 - 5.90 mil/mcL    HGB      13.0 - 17.0 g/dL    HCT      39.0 - 51.0 %    MCV      78.0 - 100.0 fl    MCH      26.0 - 34.0 pg    MCHC      32.0 - 36.5 g/dL    RDW-CV      11.0 - 15.0 %    PLT      140 - 450 K/mcL    DIFFERENTIAL TYPE          Neutrophil      %    LYMPH      %    MONO      %    EOSIN      %    BASO      %    Absolute Neutrophil      1.8 - 7.7 K/mcL    Absolute Lymph      1.0 - 4.8 K/mcL    Absolute Mono      0.3 - 0.9 K/mcL    Absolute Eos      0.1 - 0.5 K/mcL    Absolute Baso      0.0 - 0.3 K/mcL    PROTIME      9.7 - 11.8 sec    INR          Sodium      135 - 145 mmol/L    Potassium      3.4 - 5.1 mmol/L    Chloride      98 - 107 mmol/L    CO2      21 - 32 mmol/L    ANION GAP      10 - 20  mmol/L    Glucose      65 - 99 mg/dL    BUN      6 - 20 mg/dL    Creatinine      0.67 - 1.17 mg/dL    GFR Estimate,           GFR Estimate, Non African American          BUN/CREATININE RATIO      7 - 25    CALCIUM      8.4 - 10.2 mg/dL    TOTAL BILIRUBIN      0.2 - 1.0 mg/dL    AST/SGOT      <38 Units/L    ALT/SGPT      <79 Units/L    ALK PHOSPHATASE      45 - 117 Units/L    TOTAL PROTEIN      6.4 - 8.2 g/dL    Albumin      3.6 - 5.1 g/dL    GLOBULIN      2.0 - 4.0 g/dL    A/G Ratio, Serum      1.0 - 2.4    MAGNESIUM      1.7 - 2.4 mg/dL    Alcohol Ethyl      None detected. mg/dL    TSH      0.350 - 5.000 mcUnits/mL    Specimen Description          CULTURE          REPORT STATUS          U-Amphetamines      NEGATIVE    U-Barbiturates      NEGATIVE    U-Benzodiazepines      NEGATIVE    Cannabinoids (THC) UA      NEGATIVE    U-Cocaine/Metabolite      NEGATIVE    Opiates UA      NEGATIVE    U-PHENCYCLIDINE      NEGATIVE    SALICYLATE      <30.1 mg/dL    COLOR      YELLOW    CLARITY          GLUCOSE(URINE)      NEGATIVE mg/dL    BILIRUBIN      NEGATIVE    KETONES      NEGATIVE mg/dL    SPECIFIC GRAVITY      1.005 - 1.030    BLOOD      NEGATIVE    pH      5.0 - 7.0 Units    PROTEIN(URINE)      NEGATIVE mg/dL    UROBILINOGEN      0.0 - 1.0 mg/dL    NITRITE      NEGATIVE    LEUKOCYTE ESTERASE      NEGATIVE    URINE TYPE          Squamous EPI'S      0 - 5 /hpf    RBC      0 - 3 /hpf    WBC      0 - 5 /hpf    Bacteria      NONE SEEN /hpf    Hyaline Casts      0 - 5 /lpf    Amorphous Material          SOURCE       NASOPHARYNGEAL SWAB   INFLUENZA A      NEGATIVE NEGATIVE   INFLUENZA B ANTIGEN      NEGATIVE NEGATIVE   Glucometer Glucose      65 - 99 mg/dL 105 (H)   ACETAMINOPHEN      10 - 30 mcg/mL      Component      Latest Ref Rng & Units 2/26/2018   WBC      4.2 - 11.0 K/mcL 6.0   RBC      4.50 - 5.90 mil/mcL 5.64   HGB      13.0 - 17.0 g/dL 17.4 (H)   HCT      39.0 - 51.0 % 50.6   MCV      78.0 - 100.0 fl  89.7   MCH      26.0 - 34.0 pg 30.9   MCHC      32.0 - 36.5 g/dL 34.4   RDW-CV      11.0 - 15.0 % 12.6   PLT      140 - 450 K/mcL 174   DIFFERENTIAL TYPE       AUTOMATED DIFFERENTIAL   Neutrophil      % 71   LYMPH      % 16   MONO      % 10   EOSIN      % 3   BASO      % 0   Absolute Neutrophil      1.8 - 7.7 K/mcL 4.3   Absolute Lymph      1.0 - 4.8 K/mcL 0.9 (L)   Absolute Mono      0.3 - 0.9 K/mcL 0.6   Absolute Eos      0.1 - 0.5 K/mcL 0.2   Absolute Baso      0.0 - 0.3 K/mcL 0.0   PROTIME      9.7 - 11.8 sec 10.5   INR       1.0   Sodium      135 - 145 mmol/L 143   Potassium      3.4 - 5.1 mmol/L 4.5   Chloride      98 - 107 mmol/L 104   CO2      21 - 32 mmol/L 30   ANION GAP      10 - 20 mmol/L 14   Glucose      65 - 99 mg/dL 74   BUN      6 - 20 mg/dL 11   Creatinine      0.67 - 1.17 mg/dL 1.03   GFR Estimate,        >90   GFR Estimate, Non        >90   BUN/CREATININE RATIO      7 - 25 11   CALCIUM      8.4 - 10.2 mg/dL 9.8   TOTAL BILIRUBIN      0.2 - 1.0 mg/dL 0.4   AST/SGOT      <38 Units/L 26   ALT/SGPT      <79 Units/L 35   ALK PHOSPHATASE      45 - 117 Units/L 75   TOTAL PROTEIN      6.4 - 8.2 g/dL 7.9   Albumin      3.6 - 5.1 g/dL 4.4   GLOBULIN      2.0 - 4.0 g/dL 3.5   A/G Ratio, Serum      1.0 - 2.4 1.3   MAGNESIUM      1.7 - 2.4 mg/dL 2.2   Alcohol Ethyl      None detected. mg/dL None detected.   TSH      0.350 - 5.000 mcUnits/mL 0.489   Specimen Description       URINE, CLEAN CATCH/MIDSTREAM   CULTURE       NO GROWTH.   REPORT STATUS       02/27/2018 FINAL   U-Amphetamines      NEGATIVE POSITIVE (A)   U-Barbiturates      NEGATIVE NEGATIVE   U-Benzodiazepines      NEGATIVE NEGATIVE   Cannabinoids (THC) UA      NEGATIVE NEGATIVE   U-Cocaine/Metabolite      NEGATIVE NEGATIVE   Opiates UA      NEGATIVE NEGATIVE   U-PHENCYCLIDINE      NEGATIVE NEGATIVE   SALICYLATE      <30.1 mg/dL 4.2   COLOR      YELLOW YELLOW   CLARITY       HAZY   GLUCOSE(URINE)      NEGATIVE mg/dL  NEGATIVE   BILIRUBIN      NEGATIVE NEGATIVE   KETONES      NEGATIVE mg/dL NEGATIVE   SPECIFIC GRAVITY      1.005 - 1.030 1.010   BLOOD      NEGATIVE NEGATIVE   pH      5.0 - 7.0 Units 8.5 (H)   PROTEIN(URINE)      NEGATIVE mg/dL NEGATIVE   UROBILINOGEN      0.0 - 1.0 mg/dL 0.2   NITRITE      NEGATIVE NEGATIVE   LEUKOCYTE ESTERASE      NEGATIVE SMALL (A)   URINE TYPE       URINE, CLEAN CATCH/MIDSTREAM   Squamous EPI'S      0 - 5 /hpf 1 to 5   RBC      0 - 3 /hpf NONE SEEN   WBC      0 - 5 /hpf 6 to 10   Bacteria      NONE SEEN /hpf MODERATE (A)   Hyaline Casts      0 - 5 /lpf NONE SEEN   Amorphous Material       PRESENT   SOURCE          INFLUENZA A      NEGATIVE    INFLUENZA B ANTIGEN      NEGATIVE    Glucometer Glucose      65 - 99 mg/dL    ACETAMINOPHEN      10 - 30 mcg/mL <2 (L)          MENTAL STATUS EXAMINATION:  GENERAL APPEARANCE: Patient is a 29 year old  gentleman who appears the stated age. Patient is noted to be of average built and height. Patient is alert and in no apparent acute distress. Lying in bed as his usual during interactions in the morning but relatively more animated and engaged.   BEHAVIOR/DEMEANOR: Cooperative and engaged.  GAIT/STATION: Not observed ambulating  ABNORMAL MOVEMENTS: None noted.  SPEECH: Normal rate, loudness and articulation.   LANGUAGE: Intact.  MOOD: Neutral.  AFFECT: Neutral. Constricted in range  THOUGHT PROCESS: Linear and goal directed.  THOUGHT ASSOCIATIONS: Intact. No loosening of associations.   THOUGHT CONTENT: Patient is denying any suicidal, homicidal or violent ideation. Patient denies any paranoia or other delusional thoughts.   ABNORMAL PERCEPTIONS: Patient denies any Auditory or Visual hallucinations.   FUND OF KNOWLEDGE: Average.  MEMORY: Intact for remote and recent memory.  ORIENTATION: Alert and oriented to time, place, person and situation.   ATTENTION AND CONCENTRATION: Adequate.  INSIGHT: Limited but improving.  JUDGEMENT: Fair.       DIAGNOSES:   Bipolar 2 disorder, current episode seemingly depressed        OTHER DIAGNOSES:  Unspecified psychosis  ADHD by history  Social anxiety disorder  Panic disorder with agoraphobia  Remote history of alcohol abuse, now in remission  Remote history of cannabis abuse, now in remission        RECOMMENDATIONS AND FOLLOW UP:  As noted above, the patient is being discharged on Seroquel 100 mg at bedtime. He is experiencing some lightheadedness but no falls. Encouraged to improve hydration. Also discussed getting up slowly from a lying down or sitting position and with support. If symptoms persist, he was instructed to call the writer in the clinic.    He will be following up for psychiatric care for the writer.    Also has been referred for psychotherapy.    All questions and concerns were appropriately address and the patient expressed satisfaction with his care.      TIME SPENT: Greater  than 30 minutes were spent during this evaluation. Greater than 50% of the time was spent in coordination of care and counseling.     Jennifer Sanchez MD   3/1/2018  9:29 AM

## 2021-08-15 NOTE — PROGRESS NOTES
Radiology Procedure Waiver   Name: Elijah Sanchez [facility]  : 1945  MRN: [id number]    Date:  8/15/21    Time: 1:52 AM EDT    Benefits of immediately proceeding with radiology exam(s) without pre-testing outweigh the risks or are not indicated as specified below and therefore the following is/are being waived:    [x] Benefits of immediate radiology exam(s) outweigh any risk. OR    Pre-exam testing is not indicated for the following reason(s):  [] Pregnancy test   [] Patients LMP on-time and regular.   [] Patient had Tubal Ligation or has other Contraception Device. [] Patient  is Menopausal or Premenarcheal.    [] Patient had Full or Partial Hysterectomy. [] Protocol for CT contrast allegry   [] Patient has tolerated well previously   [] Patient does not have a true allergy    [] MRI Questionnaire     [] BUN/Creatinine   [] Patient age w/no hx of renal dysfunction. [] Patient on Dialysis. [] Recent Normal Labs.   Electronically signed by Meek Vences DO on 8/15/21 at 1:52 AM EDT

## 2021-08-15 NOTE — PROGRESS NOTES
Trauma Tertiary Survey    Admit Date: 8/15/66193    Hospital day 0    CC: Trauma, fall       Past Medical History:   Diagnosis Date    CHF (congestive heart failure) ([facility] [address]     Gout        Alcohol pre-screening:  Men: How many times in the past year have you had 5 or more drinks in a day?  1 or more      How much do you drink on a daily basis? 3-4 medium size glasses of vodka    Scheduled Meds:   allopurinol  300 mg Oral Daily    amLODIPine  5 mg Oral Daily    carvedilol  12.5 mg Oral BID WC    folic acid  1 mg Oral Daily    thiamine mononitrate  100 mg Oral Daily    pantoprazole  40 mg Oral QAM AC    multivitamin  1 tablet Oral Daily    sodium chloride flush  5-40 mL Intravenous 2 times per day    polyethylene glycol  17 g Oral Daily     Continuous Infusions:   sodium chloride       PRN Meds:sodium chloride flush, sodium chloride flush, sodium chloride, ondansetron **OR** ondansetron, senna, HYDROmorphone    Subjective:     Nothing new on tertiary. Vascular is consulted aspirin when tolerating. C2 fracture. Patient's only complaint is neck and some head pain associated with the cervical fracture and head laceration. On Eliquis but no signs of bleeding thus we will just stopped, without reversing. Patient in aspirin, neuro muscular and vascularly intact. Laceration on forehead is repaired. Objective:     Patient Vitals for the past 8 hrs:   BP Temp Pulse Resp SpO2   08/15/21 0617 (!) 169/96 97.5 °F (36.4 °C) 75 18 99 %   08/15/21 0520 (!) 148/91 97.2 °F (36.2 °C) 80 16 96 %   08/15/21 0343 (!) 161/83 97.6 °F (36.4 °C) 77 19 93 %       Past Medical History:   Diagnosis Date    CHF (congestive heart failure) ([facility] [address]     Gout        Radiology:  XR PELVIS (1-2 VIEWS)   Final Result   No acute bony abnormality. Chronic appearing findings. MRI or CT would be   useful if symptoms persist.         XR CHEST PORTABLE   Final Result   Mild vascular congestion.       Scattered areas of scarring and/or atelectasis in the lung bases. Developing   infiltrate from pneumonia at the right base not excluded. Continued   follow-up recommended. CTA NECK W CONTRAST   Final Result   Acute type 2 fracture of the odontoid process of C2. There are fractures   through the bilateral lamina of C2. Mild narrowing of the V3 segment of the right vertebral artery suggestive of   low-grade injury. Atherosclerosis in bilateral carotid bifurcations resulting in less than 50%   stenosis in the origin of right ICA and 50% stenosis in the origin of left   ICA. CT Head WO Contrast   Final Result   No acute intracranial abnormality. CT Cervical Spine WO Contrast   Final Result   Acute type 2 fracture of the odontoid process of C2. There are fractures   through the bilateral lamina of C2. Mild narrowing of the V3 segment of the right vertebral artery suggestive of   low-grade injury. Atherosclerosis in bilateral carotid bifurcations resulting in less than 50%   stenosis in the origin of right ICA and 50% stenosis in the origin of left   ICA. XR ELBOW LEFT (MIN 3 VIEWS)    (Results Pending)       PHYSICAL EXAM:   GCS:    4 - Opens eyes on own   6 - Follows simple motor commands  5 - Alert and oriented      Pupil size:  Left 4 mm Right 4 mm  Pupil reaction: Yes  Wiggles fingers: Left y Right y  Hand grasp:   Left y  Right y  Wiggles toes: Left y   Right y  Plantar flexion: Left y Right y    PHYSICAL EXAM   General: No apparent distress, uncomfortable, cervical pain  HEENT: Trachea midline, no masses, Pupils equal round and reactive. Laceration over right forehead repaired in ED. Mild amounts of ecchymosis over bilateral orbits. In [place] collar  Chest: Respiratory effort was normal with no retractions or use of accessory muscles. RA, SMI: 1200  Cardiovascular: Extremities warm, well perfused,   Abdomen:  Soft and non distended.   No tenderness, guarding, rebound, or rigidity Extremities: Moves all 4 extremities, No pedal edema -    Spine:     Spine Tenderness ROM   Cervical 8 /10  [place] collar   Thoracic 0 /10 Normal   Lumbar 0 /10 Normal     Musculoskeletal    Joint Tenderness Swelling ROM   Right shoulder absent absent normal   Left shoulder absent absent normal   Right elbow absent absent normal   Left elbow absent absent normal   Right wrist absent absent normal   Left wrist absent absent normal   Right hand grasp absent absent normal   Left hand grasp absent absent normal   Right hip absent absent normal   Left hip absent absent normal   Right knee absent absent normal   Left knee absent absent normal   Right ankle absent absent normal   Left ankle absent absent normal   Right foot absent absent normal   Left foot absent absent normal       CONSULTS:  Neurosurgery. PROCEDURES: Laceration repaired by ED team    INJURIES:        Active Problems:    Traumatic closed fracture of C2 vertebra with minimal displacement, initial encounter ([facility] [address]  Resolved Problems:    * No resolved hospital problems. *        Assessment/Plan:       · Neuro:   Continue to monitor neuro status 15, laceration over right forehead repaired in ED  Type II a odontoid fracture along with bilateral C2 fractures of the lamina  Neurosurgery following  [place] collar  EtOH abuse-we will order low-dose phenobarbital with phenobarbital level in 3 days 8/18  · CV:   Monitor hemodynamics stable  · Pulm: Monitor RR and SpO2, pulmonary hygiene, SMI 1200  · GI:  Diet, monitor bowel function stable  · Renal: Patient making adequate urine output  · ID: No current indications for antibiotics  · Endocrine: No acute issues  · MSK: No acute issues  · Heme: Patient takes Eliquis for A. fib, continue to hold    Bowel regime: Glycolax, MOM   Pain control/Sedation: Tylenol, Robaxin  DVT prophylaxis: SCD's, none  GI: N.p.o.  Mouth/Eye care: Per patient  [place]: none none  Code status:    Full Code  Patient/Family update:  As available

## 2021-08-15 NOTE — ED NOTES
This RN to CT and placed c-collar on pt.   Rn remained with pt during CT exam and [facility]  08/15/21 [id number]

## 2021-08-16 LAB
ANION GAP SERPL CALCULATED.3IONS-SCNC: 12 MMOL/L (ref 7–16)
BUN BLDV-MCNC: 10 MG/DL (ref 6–23)
CALCIUM SERPL-MCNC: 9.1 MG/DL (ref 8.6–10.2)
CHLORIDE BLD-SCNC: 98 MMOL/L (ref 98–107)
CO2: 27 MMOL/L (ref 22–29)
CREAT SERPL-MCNC: 0.8 MG/DL (ref 0.7–1.2)
GFR AFRICAN AMERICAN: >60
GFR NON-AFRICAN AMERICAN: >60 ML/MIN/1.73
GLUCOSE BLD-MCNC: 108 MG/DL (ref 74–99)
HCT VFR BLD CALC: 38.7 % (ref 37–54)
HEMOGLOBIN: 12.9 G/DL (ref 12.5–16.5)
LACTIC ACID: 2.1 MMOL/L (ref 0.5–2.2)
MCH RBC QN AUTO: 33.8 PG (ref 26–35)
MCHC RBC AUTO-ENTMCNC: 33.3 % (ref 32–34.5)
MCV RBC AUTO: 101.3 FL (ref 80–99.9)
PDW BLD-RTO: 16.1 FL (ref 11.5–15)
PLATELET # BLD: 210 E9/L (ref 130–450)
PMV BLD AUTO: 10.1 FL (ref 7–12)
POTASSIUM REFLEX MAGNESIUM: 4.3 MMOL/L (ref 3.5–5)
RBC # BLD: 3.82 E12/L (ref 3.8–5.8)
SODIUM BLD-SCNC: 137 MMOL/L (ref 132–146)
WBC # BLD: 7.1 E9/L (ref 4.5–11.5)

## 2021-08-16 PROCEDURE — 80048 BASIC METABOLIC PNL TOTAL CA: CPT

## 2021-08-16 PROCEDURE — 6360000002 HC RX W HCPCS: Performed by: STUDENT IN AN ORGANIZED HEALTH CARE EDUCATION/TRAINING PROGRAM

## 2021-08-16 PROCEDURE — 97165 OT EVAL LOW COMPLEX 30 MIN: CPT

## 2021-08-16 PROCEDURE — 97161 PT EVAL LOW COMPLEX 20 MIN: CPT

## 2021-08-16 PROCEDURE — 2060000000 HC ICU INTERMEDIATE R&B

## 2021-08-16 PROCEDURE — 83605 ASSAY OF LACTIC ACID: CPT

## 2021-08-16 PROCEDURE — 97530 THERAPEUTIC ACTIVITIES: CPT

## 2021-08-16 PROCEDURE — 92507 TX SP LANG VOICE COMM INDIV: CPT | Performed by: SPEECH-LANGUAGE PATHOLOGIST

## 2021-08-16 PROCEDURE — 99232 SBSQ HOSP IP/OBS MODERATE 35: CPT | Performed by: SURGERY

## 2021-08-16 PROCEDURE — 2580000003 HC RX 258: Performed by: STUDENT IN AN ORGANIZED HEALTH CARE EDUCATION/TRAINING PROGRAM

## 2021-08-16 PROCEDURE — 92523 SPEECH SOUND LANG COMPREHEN: CPT | Performed by: SPEECH-LANGUAGE PATHOLOGIST

## 2021-08-16 PROCEDURE — 99222 1ST HOSP IP/OBS MODERATE 55: CPT | Performed by: NEUROLOGICAL SURGERY

## 2021-08-16 PROCEDURE — 6370000000 HC RX 637 (ALT 250 FOR IP): Performed by: STUDENT IN AN ORGANIZED HEALTH CARE EDUCATION/TRAINING PROGRAM

## 2021-08-16 PROCEDURE — 85027 COMPLETE CBC AUTOMATED: CPT

## 2021-08-16 PROCEDURE — 36415 COLL VENOUS BLD VENIPUNCTURE: CPT

## 2021-08-16 RX ORDER — HYDRALAZINE HYDROCHLORIDE 20 MG/ML
5 INJECTION INTRAMUSCULAR; INTRAVENOUS EVERY 6 HOURS PRN
Status: DISCONTINUED | OUTPATIENT
Start: 2021-08-16 | End: 2021-08-18 | Stop reason: HOSPADM

## 2021-08-16 RX ORDER — LABETALOL HYDROCHLORIDE 5 MG/ML
10 INJECTION, SOLUTION INTRAVENOUS
Status: DISCONTINUED | OUTPATIENT
Start: 2021-08-16 | End: 2021-08-18 | Stop reason: HOSPADM

## 2021-08-16 RX ADMIN — AMLODIPINE BESYLATE 5 MG: 5 TABLET ORAL at 09:19

## 2021-08-16 RX ADMIN — METHOCARBAMOL 1000 MG: 500 TABLET ORAL at 22:04

## 2021-08-16 RX ADMIN — METHOCARBAMOL 1000 MG: 500 TABLET ORAL at 12:17

## 2021-08-16 RX ADMIN — ALLOPURINOL 300 MG: 300 TABLET ORAL at 09:18

## 2021-08-16 RX ADMIN — POLYETHYLENE GLYCOL 3350 17 G: 17 POWDER, FOR SOLUTION ORAL at 09:19

## 2021-08-16 RX ADMIN — PHENOBARBITAL SODIUM 65 MG: 65 INJECTION INTRAMUSCULAR at 00:10

## 2021-08-16 RX ADMIN — PANTOPRAZOLE SODIUM 40 MG: 40 TABLET, DELAYED RELEASE ORAL at 06:12

## 2021-08-16 RX ADMIN — FOLIC ACID 1 MG: 1 TABLET ORAL at 09:19

## 2021-08-16 RX ADMIN — PHENOBARBITAL SODIUM 65 MG: 65 INJECTION INTRAMUSCULAR at 18:54

## 2021-08-16 RX ADMIN — Medication 100 MG: at 09:19

## 2021-08-16 RX ADMIN — CARVEDILOL 12.5 MG: 6.25 TABLET, FILM COATED ORAL at 17:00

## 2021-08-16 RX ADMIN — Medication 10 ML: at 09:20

## 2021-08-16 RX ADMIN — HYDROMORPHONE HYDROCHLORIDE 0.5 MG: 1 INJECTION, SOLUTION INTRAMUSCULAR; INTRAVENOUS; SUBCUTANEOUS at 22:04

## 2021-08-16 RX ADMIN — PHENOBARBITAL SODIUM 65 MG: 65 INJECTION INTRAMUSCULAR at 11:58

## 2021-08-16 RX ADMIN — PHENOBARBITAL SODIUM 65 MG: 65 INJECTION INTRAMUSCULAR at 06:12

## 2021-08-16 RX ADMIN — Medication 10 ML: at 22:37

## 2021-08-16 RX ADMIN — Medication 1 TABLET: at 09:18

## 2021-08-16 RX ADMIN — METHOCARBAMOL 1000 MG: 500 TABLET ORAL at 09:19

## 2021-08-16 RX ADMIN — METHOCARBAMOL 1000 MG: 500 TABLET ORAL at 17:00

## 2021-08-16 RX ADMIN — OXYCODONE HYDROCHLORIDE 10 MG: 10 TABLET ORAL at 12:17

## 2021-08-16 RX ADMIN — CARVEDILOL 12.5 MG: 6.25 TABLET, FILM COATED ORAL at 09:19

## 2021-08-16 ASSESSMENT — PAIN SCALES - GENERAL
PAINLEVEL_OUTOF10: 6
PAINLEVEL_OUTOF10: 0
PAINLEVEL_OUTOF10: 7
PAINLEVEL_OUTOF10: 2
PAINLEVEL_OUTOF10: 0

## 2021-08-16 ASSESSMENT — PAIN DESCRIPTION - PAIN TYPE: TYPE: ACUTE PAIN

## 2021-08-16 ASSESSMENT — ENCOUNTER SYMPTOMS
TROUBLE SWALLOWING: 0
SHORTNESS OF BREATH: 0
PHOTOPHOBIA: 0
ABDOMINAL PAIN: 0

## 2021-08-16 NOTE — PLAN OF CARE
Problem: Pain:  Description: Pain management should include both nonpharmacologic and pharmacologic interventions.   Goal: Pain level will decrease  Description: Pain level will decrease  Outcome: Met This Shift  Note: Med with oxycodone and dilaudid

## 2021-08-16 NOTE — CONSULTS
NEUROSURGERY CONSULTATION     Chief Complaint: seen for odontoid fracture     HPI:   Maame Geiger is a 66 y.o.  male who has history of CHF, gout, Afib for which he takes Eliquis, and alcohol abuse. He presents to the hospital after falling. It is reported that pt was drinking yesterday and fell forward hitting his head. Pt currently c/o neck pain. Cervical CT scan demonstrates acute type II odontoid fracture with extension through the lamina for which neurosurgery was consulted.      Past Medical History:   Diagnosis Date    CHF (congestive heart failure) (HCC)     Gout      Past Surgical History:   Procedure Laterality Date    HC INSERT PICC CATH, 5/> YRS  1/2/2021           Family History   Problem Relation Age of Onset    Breast Cancer Mother     No Known Problems Sister     No Known Problems Brother     No Known Problems Sister       Social History     Socioeconomic History    Marital status: Unknown     Spouse name: Not on file    Number of children: Not on file    Years of education: Not on file    Highest education level: Not on file   Occupational History    Not on file   Tobacco Use    Smoking status: Never Smoker    Smokeless tobacco: Never Used   Vaping Use    Vaping Use: Never used   Substance and Sexual Activity    Alcohol use: Yes     Comment: SOCIAL    Drug use: Never    Sexual activity: Not on file   Other Topics Concern    Not on file   Social History Narrative    Not on file     Social Determinants of Health     Financial Resource Strain:     Difficulty of Paying Living Expenses:    Food Insecurity:     Worried About Running Out of Food in the Last Year:     Ran Out of Food in the Last Year:    Transportation Needs:     Lack of Transportation (Medical):     Lack of Transportation (Non-Medical):    Physical Activity:     Days of Exercise per Week:     Minutes of Exercise per Session:    Stress:     Feeling of Stress :    Social Connections:     Frequency of Communication with Friends and Family:     Frequency of Social Gatherings with Friends and Family:     Attends [employer] Services:      Active Member of Clubs or Organizations:     Attends Club or Organization Meetings:     Marital Status:    Intimate Partner Violence:     Fear of Current or Ex-Partner:     Emotionally Abused:     Physically Abused:     Sexually Abused:        Medications:   Current Facility-Administered Medications   Medication Dose Route Frequency Provider Last Rate Last Admin    labetalol (NORMODYNE;TRANDATE) injection 10 mg  10 mg Intravenous Q1H PRN Mary Fraser MD        hydrALAZINE (APRESOLINE) injection 5 mg  5 mg Intravenous Q6H PRN Mayr Fraser MD        sodium chloride flush 0.9 % injection 10 mL  10 mL Intravenous PRN Diaz Wylie DO        allopurinol (ZYLOPRIM) tablet 300 mg  300 mg Oral Daily Dwight Torres DO        amLODIPine (NORVASC) tablet 5 mg  5 mg Oral Daily Dwight Torres, DO   5 mg at 08/15/21 0912    carvedilol (COREG) tablet 12.5 mg  12.5 mg Oral BID  Dwight Torres DO   12.5 mg at 31/17/33 4980    folic acid (FOLVITE) tablet 1 mg  1 mg Oral Daily Nick Monreal DO   1 mg at 08/15/21 5112    thiamine mononitrate tablet 100 mg  100 mg Oral Daily Dwight Torres DO        pantoprazole (PROTONIX) tablet 40 mg  40 mg Oral QAM  Dwight Torres, DO   40 mg at 08/16/21 8637    multivitamin 1 tablet  1 tablet Oral Daily Dwight Torres DO        sodium chloride flush 0.9 % injection 5-40 mL  5-40 mL Intravenous 2 times per day Nick Monreal DO   10 mL at 08/15/21 2140    sodium chloride flush 0.9 % injection 5-40 mL  5-40 mL Intravenous PRN Dwight Torres DO        0.9 % sodium chloride infusion  25 mL Intravenous PRN Nick Monreal DO        ondansetron (ZOFRAN-ODT) disintegrating tablet 4 mg  4 mg Oral Q8H PRN Nick Monreal DO        Or    ondansetron (ZOFRAN) injection 4 mg  4 mg Intravenous Q6H PRN Dwight Torres DO        polyethylene glycol (GLYCOLAX) packet 17 g  17 g Oral Daily Olivia Organ, DO        senna (SENOKOT) tablet 8.6 mg  1 tablet Oral Daily PRN Olivia Organ, DO        HYDROmorphone (DILAUDID) injection 0.5 mg  0.5 mg Intravenous Q4H PRN Olivia Organ, DO   0.5 mg at 08/15/21 2025    methocarbamol (ROBAXIN) tablet 1,000 mg  1,000 mg Oral 4x Daily Gianmarino C Gianfrate, DO   1,000 mg at 08/15/21 2140    oxyCODONE (ROXICODONE) immediate release tablet 5 mg  5 mg Oral Q4H PRN Gianmarino C Gianfrate, DO        Or    oxyCODONE HCl (OXY-IR) immediate release tablet 10 mg  10 mg Oral Q4H PRN Gianmarino C Gianfrate, DO   10 mg at 08/15/21 1558    PHENobarbital (LUMINAL) injection 65 mg  65 mg Intravenous 4 times per day Gianmarino C Gianfrate, DO   65 mg at 08/16/21 6607        Allergies:    Levofloxacin       Review of Systems   Constitutional: Negative for fever. HENT: Negative for trouble swallowing. Eyes: Negative for photophobia. Respiratory: Negative for shortness of breath. Cardiovascular: Negative for chest pain. Gastrointestinal: Negative for abdominal pain. Endocrine: Negative for heat intolerance. Genitourinary: Negative for flank pain. Musculoskeletal: Positive for neck pain. Skin: Negative for wound. Neurological: Negative for weakness, numbness and headaches. Psychiatric/Behavioral: Negative for confusion. Physical Exam  Constitutional:       Appearance: Normal appearance. He is well-developed. HENT:      Head: Normocephalic. Comments: Forehead laceration   Eyes:      Extraocular Movements: Extraocular movements intact. Conjunctiva/sclera: Conjunctivae normal.      Pupils: Pupils are equal, round, and reactive to light. Neck:      Comments: In cervical collar, not fitting correctly  Tenderness to palpation of cervical spine  Cardiovascular:      Rate and Rhythm: Normal rate. Pulmonary:      Effort: Pulmonary effort is normal.   Abdominal:      General: There is no distension.    Musculoskeletal:      Cervical back: Neck supple. Skin:     General: Skin is warm and dry. Neurological:      Mental Status: He is alert. Comments: Alert and oriented x3  CN3-12 intact  Motor strength full  Sensation intact to light touch  Reflexes normal    Psychiatric:         Thought Content: Thought content normal.          BP (!) 163/90   Pulse 79   Temp 98 °F (36.7 °C) (Temporal)   Resp 16   Ht 5' 11.5\" (1.816 m)   Wt 210 lb 8.6 oz (95.5 kg)   SpO2 94%   BMI 28.96 kg/m²        Assessment:   Acute type II odontoid fracture    Plan:  -No surgical intervention. Will manage fracture in custom cervical collar x3 months.   -Custom cervical collar ordered. Please fax to [facility]  -Pain control  -Follow up in neurosurgery office in 4 weeks with cervical x-rays      Electronically signed by Adrienne Braun PA-C on 8/16/2021 at 9:18 AM     Nsx Attending:    Patient was seen and examined by me with the team.  I personally reviewed all pertinent radiological images. I concur with Miss Edward's clinical assessment and plan. In brief, 66year old extensive past medical history including A. fib for which she takes Eliquis as well as alcohol abuse presents after falling headfirst.  Complained of neck pain and CT scan was performed which demonstrated an odontoid fracture with linear fractures through the lamina. On my exam he was neurologically nonfocal.  Moves all extremities with preserved strength. CT was reviewed by me and I concur with the above findings. Strict custom collar ATC    Thank you so much for allowing us to participate in the care of this patient.

## 2021-08-16 NOTE — PROGRESS NOTES
[id number] [place] [address]       OVS                                                  Patient Name: Brianne Velásquez  MRN: [id number]  : 1945  Room: [address] [place] [address]    Evaluating OT: [address] [address]    Referring Provider[de-identified] Kalyan Brown DO     Specific Provider Orders/Date: OT evaluation and treatment 8/15/21    Diagnosis: Traumatic closed fracture of C2 vertebra with minimal displacement, initial encounter [facility])      Pertinent Medical History: CHF, gout, COVID,         Precautions:  Fall Risk, Aspen collar, cervical precautions,     Assessment of current deficits   [x] Functional mobility   [x]ADLs  [x] Strength               []Cognition   [x] Functional transfers   [] IADLs         [x] Safety Awareness   [x]Endurance   [] Fine Coordination              [x] Balance      [] Vision/perception   []Sensation    []Gross Motor Coordination  [x] ROM  [] Delirium                   [] Motor Control     OT PLAN OF CARE   OT POC based on physician orders, patient diagnosis and results of clinical assessment    Frequency/Duration  2-5 days/wk for 2 weeks PRN   Specific OT Treatment to include:   * Instruction/training on adapted ADL techniques and AE recommendations to increase functional independence within precautions       * Training on energy conservation strategies, correct breathing pattern and techniques to improve independence/tolerance for self-care routine  * Functional transfer/mobility training/DME recommendations for increased independence, safety, and fall prevention  * Patient/Family education to increase follow through with safety techniques and functional independence  * Therapeutic exercise to improve motor endurance, ROM, and functional strength for ADLs/functional transfers  * Therapeutic activities to facilitate/challenge dynamic balance, stand tolerance for increased safety and independence with ADLs  * Positioning to improve skin integrity, interaction with environment and functional independence    Recommended Adaptive Equipment:  TBD     Home Living: Pt lives with SO in a 2 story home with bed and bath on 2nd. Bathroom setup: walk in shower with shower chair, standard commode with riser,    Equipment owned: hurry cane, toilet riser    Prior Level of Function: indep with ADLs , indep with IADLs; ambulated with hurry cane  Driving: no      Pain Level: chronic generalized pain 6/10  Cognition: A&O: 4/4;   Follows 2 step directions: good    Memory:  good    Sequencing:  fair    Problem solving:  fair    Judgement/safety:  fair     Functional Assessment:   AM-PAC Daily Activity Raw Score: 14/24     Initial Eval Status  Date: 8/16/21 Treatment Status  Date: STG=LTG  Time frame: 10-14 days   Feeding Stand by Assist   Independent    Grooming Moderate Assist   Modified [place]    UB Dressing Minimal Assist   Modified [place]    LB Dressing Maximal Assist   Stand by Assist    Bathing Moderate Assist  Stand by Assist    Toileting Moderate Assist   Stand by Assist    Bed Mobility  Supine to sit: Moderate Assist with spinal neutrality/log roll tech  Sit to supine:  Moderate Assist   Supine to sit: Modified [place]   Sit to supine: Modified [place]    Functional Transfers Sit to stand: Minimal Assist   Stand to sit: Minimal Assist   Stand pivot: NT   Modified [place]    Functional Mobility Min A with ww  3 side steps  Mod indep with AAD   Balance Sitting: SBA     Standing: min A  Sitting: indep      Standing: modified indep with AAD   Activity Tolerance poor  good   Visual/  Perceptual Glasses: yes          BUE  ROM/Strength/  Fine motor Coordination Hand dominance: R    BUE: ROM limited in shoulders but grossly WFL     Strength: grossly 3+/5      Strength:  WFL     Coordination:   WFL         Hearing: WFL   Sensation:  No c/o numbness or tingling   Tone:  WFL   Edema:  None noted    Comments:   RN cleared patient for OT. Upon arrival patient in bed. Pt education on cervical precautions and spinal neutrality. Therapist facilitated and instructed pt on adapted  techniques & compensatory strategies to improve safety and independence with basic ADLs, bed mobility,  functional transfers & mobility to allow pt to achieve highest level of independence and safely. Pt demonstrated good understanding of education & follow through. At end of session, patient in bed with call light and phone within reach, all lines and tubes intact. Overall, patient demonstrated  decreased independence and safety during completion of ADL/functional transfer/mobility tasks. Pt would benefit from continued skilled OT to increase safety and independence with completion of ADL tasks and functional mobility for improved quality of life. Treatment: OT treatment provided this date includes:    ADL-  Instruction/training on safety and adapted techniques for completion of LE dressing and recommendation of reachers and sock aid to improve independence with dressing LE d/t limited reach    Mobility-  Instruction/training on safety , spinal neutrality  For improved independence with bed mobility  functional transfers  and functional mobility  Therapist facilitated and provided cues for body alignment and hand/feet placement.   Sitting EOB x 15 minutes to improve dynamic sitting balance and activity tolerance during ADLs.   Skilled positioning/alignment-  Proper Positioning/Alignment in bed to improve comfort      Rehab Potential: Good  for established goals     Patient / Family Goal:  Not stated    Patient and/or family were instructed on functional diagnosis, prognosis/goals and OT plan of care. Demonstrated good understanding.      Eval Complexity: Low    Time In: 11:35  Time Out: 12:01  Total Treatment Time: 13 minutes    Min Units   OT Eval Low 64595  x     OT Eval Medium 40669      OT Eval High [id number]       OT Re-Eval [id number]       Therapeutic Ex [id number]       Therapeutic Activities 13603  13 1   ADL/Self Care 74018      Orthotic Management 72208       Neuro Re-Ed 85169       Non-Billable Time          Evaluation Time includes thorough review of current medical information, gathering information on past medical history/social history and prior level of function, completion of standardized testing/informal observation of tasks, assessment of data and education on plan of care and goals.             [place], [place] [address]

## 2021-08-16 NOTE — PROGRESS NOTES
SPEECH/LANGUAGE PATHOLOGY  SPEECH/LANGUAGE/COGNITIVE EVALUATION   and PLAN OF CARE      PATIENT NAME:  Darline Spain  (male)     MRN:  [id number]    :  1945  (66 y.o.)  STATUS:  Inpatient: Room 8514/8514-A    08/15/21 0630   Speech language pathology evaluation Start: 08/15/21 0630, End: 08/15/21 0630, ONE TIME, Standing Count: 1 Occurrences, R    Subhash Crump DO  TODAY'S DATE:  2021    REASON FOR REFERRAL:  Trauma- fall down steps with unknown LOC  EVALUATING THERAPIST: Danny Francisco SLP    ADMITTING DIAGNOSIS: Fall, initial encounter [W19. XXXA]  Traumatic closed fracture of C2 vertebra with minimal displacement[facility] [S12.100A]  Laceration of right temporomandibular area without foreign body, initial encounter [C69.670E]    VISIT DIAGNOSIS:   Visit Diagnoses       Codes    Fall, initial encounter     [address] XXXA    Laceration of right temporomandibular area without foreign body, initial encounter     S01.411A           SPEECH THERAPY  PLAN OF CARE   The speech therapy  POC is established based on physician order, speech pathology diagnosis and results of clinical assessment     SPEECH PATHOLOGY DIAGNOSIS:    Questionable cognitive deficits-- Pt noted to be drowsy during assessment     Speech Pathology intervention is recommended 3-6 times per week for LOS or when goals are met with emphasis on the following:      Conditions Requiring Skilled Therapeutic Intervention for speech, language and/or cognition    Cognitive linguistic impairment    Specific Speech Therapy Interventions to Include: Therapeutic tasks for Cognition    Specific instructions for next treatment:      To initiate POC    SHORT/LONG TERM GOALS  Pt will improve problem solving/thought organization during structured and unstructured tasks with 90% accuracy   Pt will improve receptive and expressive language skills with adequate thought content, organization, and processing time to facilitate improved communication with minimal.    Patient goals: Patient/family involved in developing goals and treatment plan:   Treatment goals discussed with Patient    The Patient understand(s) the diagnosis, prognosis and plan of care   The patient/family Agreed with above,     This plan may be re-evaluated and revised as warranted. Rehabilitation Potential/Prognosis: good                CLINICAL ASSESSMENT:  MOTOR SPEECH       Oral Peripheral Examination   Adequate lingual/labial strength     Parameters of Speech Production  Respiration:  Adequate for speech production  Articulation:  Within functional limits  Resonance:  Within functional limits  Quality:   Within functional limits  Pitch: Within functional limits  Intensity: Within functional limits  Fluency:  Intact  Prosody Intact    RECEPTIVE LANGUAGE    Comprehension of Yes/No Questions: Within functional limits    Process  Simple Verbal Commands:   Within functional limits  Process Intermediate Verbal Commands:   Within functional limits  Process Complex Verbal Commands:     Within functional limits    Comprehension of Conversation:      Latent d/t drowsiness      EXPRESSIVE LANGUAGE     Serials: Functional    Imitation:  Words   Functional   Sentences Functional    Naming:  (Modality used:  Verbal)   No anomia during conversation    Conversation:      Conversation was within functional limits    COGNITION     Attention/Orientation  Attention: Easily Distracted d/t fatigue  Orientation:  Oriented to Person, Place, Date, Reason for hospitalization    Memory   Immediate Recall: Repeated 2/3    Delayed Recall:   Recalled 3/3    Long Term Recall:   Recalled Address, Birthdate and Family    Organization/Problem Solving/Reasoning   Verbal Sequencing:    To be assessed        Verbal Problem solving:   Functional          CLINICAL OBSERVATIONS NOTED DURING THE EVALUATION  Latent responses                  EDUCATION:   The Speech Language Pathologist (SLP) completed education regarding results of evaluation and that intervention is warranted at this time. Learner: Patient  Education: Reviewed results and recommendations of this evaluation  Evaluation of Education:  Verbalizes understanding    Evaluation Time includes thorough review of current medical information, gathering information on past medical history/social history and prior level of function, completion of standardized testing/informal observation of tasks, assessment of data and education on plan of care and goals. CPT code:    60174  eval speech sound lang comprehension / [phone] speech and language therapy    Pt lethargic/ drowsy but participated easily with SLP. Pt responding appropriately to SLP however required increased response time and/or repetition from SLP. Despite this, thought process/ organization seemed adequate and functional. Will re-assess as drowsy improves. The admitting diagnosis and active problem list, as listed below have been reviewed prior to initiation of this evaluation.         ACTIVE PROBLEM LIST:   Patient Active Problem List   Diagnosis    DVT (deep venous thrombosis) (HCC)    CHF with unknown LVEF (HCC)    Chest pain    COVID-19    Hypokalemia    Hypomagnesemia    Fever and chills    Hypoxia    Acute hypoxemic respiratory failure due to severe acute respiratory syndrome coronavirus 2 (SARS-CoV-2) disease ([facility]    Traumatic closed fracture of C2 vertebra with minimal displacement, [facility]       Joseluis Chapman, [address] Pathologist  [id number]  8/16/2021

## 2021-08-16 NOTE — PROGRESS NOTES
Physical Therapy  Physical Therapy Initial Assessment     Name: Marium Isaac  : 1945  MRN: [id number]      Date of Service: 2021    Evaluating PT:  Ginny Zuniga, PT, DPT [id number]    Room #:  0518/6669-T  Diagnosis:  Fall, initial encounter [W19. XXXA]  Traumatic closed fracture of C2 vertebra with minimal displacement, initial encounter ([facility] [S12.100A]  Laceration of right temporomandibular area without foreign body, initial encounter [S01.411A]  PMHx/PSHx:  Gout, CHF  Precautions:  Falls, c-collar for acute type II odontoid fx  Equipment Needs:  Front [employer]    SUBJECTIVE:    Pt lives with partner in a 2 story home with 4 stairs to enter and 1 rail. Bed is on second floor and bath is on second floor. 14 steps with 1 rail to second floor. Pt ambulated with hurrycane Owen PTA. Pt admits to multiple falls in the past 12 months. Pt reports he has been participating with OP PT services for balance training. OBJECTIVE:   Initial Evaluation  Date: 2021 Treatment Short Term/ Long Term   Goals   AM-PAC 6 Clicks 26/33     Was pt agreeable to Eval/treatment? yes     Does pt have pain? 6/10 cervical pain/headache     Bed Mobility  Rolling: modA  Supine to sit: modA  Sit to supine: modA  Scooting: Jorge A  Rolling: Independent  Supine to sit: Independent  Sit to supine: Independent  Scooting: Independent   Transfers Sit to stand: Jorge A  Stand to sit: Jorge A  Stand pivot: Jorge A front [employer]  Sit to stand: Independent  Stand to sit: Independent  Stand pivot: Owen front [employer]   Ambulation    25 feet with front [employer] Jorge A  150 feet with front [employer] Owen   Stair negotiation: ascended and descended  NT  14 steps with 1 rail Owen   ROM BUE:  Per OT note  BLE:  WNL     Strength BUE:  Per OT note  BLE:  WNL     Balance Sitting EOB:  SBA  Dynamic Standing:  Jorge A front [employer]  Sitting EOB:  Independent  Dynamic Standing:  Owen front [employer]     Pt is A & O x 4  Sensation:  Pt denies numbness and tingling to extremities.   Edema: unremarkable    Patient education  Pt educated on role of PT intervention. Pt educated on safety in room with utilization of call light for assistance with mobility. Pt educated on importance of maximizing OOB time by transferring to bedside chair for meals and ambulating to bathroom/transferring to bedside commode with assistance from nursing and therapy staff to increase functional activity tolerance and overall functional independence. Patient response to education:   Pt verbalized understanding Pt demonstrated skill Pt requires further education in this area   yes yes yes     ASSESSMENT:    Conditions Requiring Skilled Therapeutic Intervention:    [x]Decreased strength     [x]Decreased ROM  [x]Decreased functional mobility  [x]Decreased balance   [x]Decreased endurance   []Decreased posture  []Decreased sensation  []Decreased coordination   []Decreased vision  []Decreased safety awareness   [x]Increased pain       Comments:  RN cleared pt for activity prior to session. Pt received supine in bed and agreeable to PT intervention at this time. Pt performed all functional mobility as noted above. Pt presents after a fall at home with cervical fx and head laceration. He is complaining of neck and head pain as well as light headedness and mild nausea limiting mobility. Previously independent but does admit to multiple falls this past year. Ambulated with [employer] around room and then returned to supine at end of session and left with all needs met and call light in reach. Session limited by pain and light headedness. Pt requires continued skilled PT intervention for the purposes of maximizing functional mobility and independence by addressing deficits described above. Treatment:  Patient practiced and was instructed in the following treatment:     Therapeutic Activities Completed:  o Functional mobility as noted above:   - Bed mobility: as noted above.   Max VC and hand over hand guidance to facilitate efficient use of BUE on bed rail to promote more independent completion of task. Increased time and effort to complete. Sat EOB unsupported at SBA level x 10 minutes while light headedness resolved. - Transfer training: sit<>stand:   Jorge A from EOB. Cues for proper hand placement and sequencing with [employer] for safety.   - Ambulation: 25 feet front [employer] Jorge A x 2 reps. Standing rest break between bouts. Mildly unsteady but smooth reciprocal gait otherwise. Cues for proper use of WW to improve dynamic standing balance. Further ambulation limited by pain and lightheadedness  o Skilled repositioning in supine with HOB elevated for comfort.  o Pt education as noted above. Pt's/ family goals   1. Return home. Prognosis is fair for reaching above PT goals. Patient and or family understand(s) diagnosis, prognosis, and plan of care. yes    PHYSICAL THERAPY PLAN OF CARE:    PT POC is established based on physician order and patient diagnosis     Referring provider/PT Order:    08/15/21 0630  PT evaluation and treat Start: 08/15/21 0630, End: 08/15/21 0630, ONE TIME, Standing Count: 1 Occurrences, R      Kirt Joyner, DO     Diagnosis:  Fall, initial encounter [W19. XXXA]  Traumatic closed fracture of C2 vertebra with minimal displacement, initial encounter ([facility] [S12.100A]  Laceration of right temporomandibular area without foreign body, initial encounter [S01.411A]  Specific instructions for next treatment:  Ambulate as tolerated. LE strengthening. Dynamic sitting/standing balance activities for improved overall functional mobility.     Current Treatment Recommendations:     [x] Strengthening to improve independence with functional mobility   [x] ROM to improve independence with functional mobility   [x] Balance Training to improve static/dynamic balance and to reduce fall risk  [x] Endurance Training to improve activity tolerance during functional mobility   [x] Transfer Training to improve safety and independence with all functional transfers   [x] Gait Training to improve gait mechanics, endurance and assess need for appropriate assistive device  [x] Stair Training in preparation for safe discharge home and/or into the community   [x] Positioning to prevent skin breakdown and contractures  [x] Safety and Education Training   [x] Patient/Caregiver Education   [] HEP  [] Other     PT long term treatment goals are located in above grid    Frequency of treatments: 2-5x/week x 1-2 weeks. Time in  1420  Time out  1445    Total Treatment Time  15 minutes     Evaluation Time includes thorough review of current medical information, gathering information on past medical history/social history and prior level of function, completion of standardized testing/informal observation of tasks, assessment of data and education on plan of care and goals.     CPT codes:  [x] Low Complexity PT evaluation 50850  [] Moderate Complexity PT evaluation 30888  [] High Complexity PT evaluation 78000  [] PT Re-evaluation 72319  [] Gait training 93284 0 minutes  [] Manual therapy 51145 0 minutes  [x] Therapeutic activities 75248 15 minutes  [] Therapeutic exercises 35960 0 minutes  [] Neuromuscular reeducation 24922 0 minutes     Joshua Black, PT, DPT  [id number]

## 2021-08-16 NOTE — PLAN OF CARE
Problem: Pain:  Goal: Pain level will decrease  Description: Pain level will decrease  8/16/2021 0358 by Samy Anguiano RN  Outcome: Met This Shift  8/15/2021 2254 by Andrei Light RN  Outcome: Met This Shift  Note: Med with oxycodone and dilaudid   Goal: Control of acute pain  Description: Control of acute pain  Outcome: Met This Shift  Goal: Control of chronic pain  Description: Control of chronic pain  Outcome: Met This Shift  Goal: Patient's pain/discomfort is manageable  Description: Patient's pain/discomfort is manageable  Outcome: Met This Shift     Problem: Skin Integrity:  Goal: Will show no infection signs and symptoms  Description: Will show no infection signs and symptoms  Outcome: Met This Shift  Goal: Absence of new skin breakdown  Description: Absence of new skin breakdown  Outcome: Met This Shift     Problem: Infection:  Goal: Will remain free from infection  Description: Will remain free from infection  Outcome: Met This Shift     Problem: Safety:  Goal: Free from accidental physical injury  Description: Free from accidental physical injury  Outcome: Met This Shift  Goal: Free from intentional harm  Description: Free from intentional harm  Outcome: Met This Shift     Problem: Daily Care:  Goal: Daily care needs are met  Description: Daily care needs are met  Outcome: Met This Shift     Problem: Skin Integrity:  Goal: Skin integrity will stabilize  Description: Skin integrity will stabilize  Outcome: Met This Shift     Problem: Discharge Planning:  Goal: Patients continuum of care needs are met  Description: Patients continuum of care needs are met  Outcome: Met This Shift

## 2021-08-16 NOTE — PROGRESS NOTES
[facility]   ATTENDING PHYSICIAN PROGRESS NOTE     I have examined the patient, reviewed the record, and discussed the case with the APN/ Resident. I have reviewed all relevant labs and imaging data. The following summarizes my clinical findings and independent assessment. CC: fall    Patient complains of pain in his hand near IV site as well as pain due to cervical collar. Awake and alert  Follows commands  Heart: Irregular  Lungs: Fairly clear bilaterally  Abdomen: Soft; bowel sounds active; nontender/nondistended  Skin: Warm/dry; sutures to scalp laceration  Extremities: Strength 5 out of 5 bilateral upper and lower extremities  C-collar in place    Patient Active Problem List    Diagnosis Date Noted    Traumatic closed fracture [facility]2 vertebra with minimal displacement[facility] 08/15/2021    COVID-19 12/28/2020    Hypokalemia 12/28/2020    Hypomagnesemia 12/28/2020    Fever and chills 12/28/2020    Hypoxia 12/28/2020    Acute hypoxemic respiratory failure due to severe acute respiratory syndrome coronavirus 2 (SARS-CoV-2) disease ([facility] 12/28/2020    Chest pain 12/22/2019    DVT (deep venous thrombosis) ([facility] 12/20/2019    CHF with unknown LVEF ([facility] 12/20/2019       Status post fall  C2 fracture--maintain cervical collar--await custom collar  Scalp laceration--local wound care  Suspected vertebral artery injury--aspirin per vascular  Diet as tolerated  PT/OT evals  DVT risk--PCDs    Irma Toro MD, FACS  8/16/2021  2:20 PM      NOTE: This report was transcribed using voice recognition software. Every effort was made to ensure accuracy; however, inadvertent computerized transcription errors may be present.

## 2021-08-16 NOTE — PROGRESS NOTES
Physician Progress Note      PATIENT:               Zeeshan Crisostomo  CSN #:                  [id number]  :                       1945  ADMIT DATE:       8/15/2021 12:20 AM  DISCH DATE:  RESPONDING  PROVIDER #:        David Arnold MD          QUERY TEXT:    Patient noted to have atrial fibrillation. If possible, please document in   progress notes and discharge summary further specificity regarding the type of   atrial fibrillation: The medical record reflects the following:  Risk Factors: afib  Clinical Indicators: ED notes \"Patient reports taking Eliquis chronically for   atrial fibrillation. \"  Treatment: Eliquis    Thank you,  Scott Carirllo, RN, BSN, CCDS, Clinical Documentation Improvement  [phone]  Options provided:  -- Paroxysmal Atrial Fibrillation  -- Longstanding Persistent Atrial Fibrillation  -- Permanent Atrial Fibrillation  -- Persistent Atrial Fibrillation  -- Chronic Atrial Fibrillation, unspecified  -- Other - I will add my own diagnosis  -- Disagree - Not applicable / Not valid  -- Disagree - Clinically unable to determine / Unknown  -- Refer to Clinical Documentation Reviewer    PROVIDER RESPONSE TEXT:    Unable to determine. A fib is reported by the patient but no EKG demonstrating   A fib in our system. Possibly Paroxysmal A. Fib      Query created by: Pequea Signs on 2021 12:51 PM      QUERY TEXT:    Pt hx CHF documented. If possible, please document in progress notes and   discharge summary further specificity regarding the type and acuity of CHF:    The medical record reflects the following:  Risk Factors: hx CHF  Clinical Indicators: ED notes \" has a past medical history of CHF (congestive   heart failure) ([address] and Gout. \" ECHO from 2020 \"Estimated left   ventricular ejection fraction is 28?5%. <50% criteria for diastolic   dysfunction. \"  Treatment: home meds include Bumex and Entresto    Thank you,  Scott Carrillo RN, BSN, CCDS, Clinical Documentation Improvement  [phone]  Options provided:  -- Chronic Systolic CHF/HFrEF  -- Chronic Diastolic CHF/HFpEF  -- Chronic Systolic and Diastolic CHF  -- Other - I will add my own diagnosis  -- Disagree - Not applicable / Not valid  -- Disagree - Clinically unable to determine / Unknown  -- Refer to Clinical Documentation Reviewer    PROVIDER RESPONSE TEXT:    This patient has chronic diastolic CHF/HFpEF.     Query created by: Olive Montana on 8/16/2021 12:56 PM      Electronically signed by:  Lois Barroso MD 8/16/2021 1:07 PM

## 2021-08-16 NOTE — PROGRESS NOTES
Vascular Surgery Progress Note    Pt is being seen in f/u today regarding questionable vertebral injury    Subjective:  Ann Barrera is a 66 y.o. male at this point he Rene is doing okay. He denies any right-sided left-sided weakness numbness or vision changes. He denies active chest pain. His main discomfort is associated with the trauma to his head. He otherwise is doing well and was eating breakfast.    Current Medications:    sodium chloride        labetalol, hydrALAZINE, sodium chloride flush, sodium chloride flush, sodium chloride, ondansetron **OR** ondansetron, senna, HYDROmorphone, oxyCODONE **OR** oxyCODONE    allopurinol  300 mg Oral Daily    amLODIPine  5 mg Oral Daily    carvedilol  12.5 mg Oral BID WC    folic acid  1 mg Oral Daily    thiamine mononitrate  100 mg Oral Daily    pantoprazole  40 mg Oral QAM AC    multivitamin  1 tablet Oral Daily    sodium chloride flush  5-40 mL Intravenous 2 times per day    polyethylene glycol  17 g Oral Daily    methocarbamol  1,000 mg Oral 4x Daily    PHENobarbital  65 mg Intravenous 4 times per day        PHYSICAL EXAM:    BP (!) 163/90   Pulse 73   Temp 98 °F (36.7 °C) (Temporal)   Resp 16   Ht 5' 11.5\" (1.816 m)   Wt 210 lb 8.6 oz (95.5 kg)   SpO2 94%   BMI 28.96 kg/m²     Intake/Output Summary (Last 24 hours) at 8/16/2021 1106  Last data filed at 8/16/2021 1036  Gross per 24 hour   Intake    Output 2100 ml   Net -2100 ml          General: Awake alert answers questions appropriately no acute distress  Skin: Blood stains are noted of the forehead status post fall and injury  HEENT: Traumatic laceration to the head  CVS: Currently regular rate and rhythm no murmur rub or gallop  Resp: Clear to auscultation bilaterally no wheeze rales rhonchi  Abd: Soft nontender no rebound or guarding  Extremities: Bilateral palpable DP and PT. Right is better than the left.   Palpable brachial radial pulses no gross vascular or neurological office in 1 month.         Electronically signed by Freya Ye MD on 8/16/2021 at 11:06 AM

## 2021-08-17 PROCEDURE — 2580000003 HC RX 258: Performed by: STUDENT IN AN ORGANIZED HEALTH CARE EDUCATION/TRAINING PROGRAM

## 2021-08-17 PROCEDURE — 97535 SELF CARE MNGMENT TRAINING: CPT

## 2021-08-17 PROCEDURE — 99232 SBSQ HOSP IP/OBS MODERATE 35: CPT | Performed by: SURGERY

## 2021-08-17 PROCEDURE — 6360000002 HC RX W HCPCS: Performed by: STUDENT IN AN ORGANIZED HEALTH CARE EDUCATION/TRAINING PROGRAM

## 2021-08-17 PROCEDURE — 6370000000 HC RX 637 (ALT 250 FOR IP): Performed by: STUDENT IN AN ORGANIZED HEALTH CARE EDUCATION/TRAINING PROGRAM

## 2021-08-17 PROCEDURE — 2060000000 HC ICU INTERMEDIATE R&B

## 2021-08-17 PROCEDURE — 99232 SBSQ HOSP IP/OBS MODERATE 35: CPT | Performed by: NEUROLOGICAL SURGERY

## 2021-08-17 PROCEDURE — 97530 THERAPEUTIC ACTIVITIES: CPT

## 2021-08-17 PROCEDURE — 97129 THER IVNTJ 1ST 15 MIN: CPT | Performed by: SPEECH-LANGUAGE PATHOLOGIST

## 2021-08-17 PROCEDURE — 2580000003 HC RX 258: Performed by: RADIOLOGY

## 2021-08-17 RX ORDER — PHENOBARBITAL 32.4 MG/1
64.8 TABLET ORAL 4 TIMES DAILY
Status: DISCONTINUED | OUTPATIENT
Start: 2021-08-17 | End: 2021-08-18 | Stop reason: HOSPADM

## 2021-08-17 RX ADMIN — ENOXAPARIN SODIUM 40 MG: 40 INJECTION SUBCUTANEOUS at 11:40

## 2021-08-17 RX ADMIN — PANTOPRAZOLE SODIUM 40 MG: 40 TABLET, DELAYED RELEASE ORAL at 05:13

## 2021-08-17 RX ADMIN — Medication 10 ML: at 08:15

## 2021-08-17 RX ADMIN — PHENOBARBITAL SODIUM 65 MG: 65 INJECTION INTRAMUSCULAR at 11:38

## 2021-08-17 RX ADMIN — CARVEDILOL 12.5 MG: 6.25 TABLET, FILM COATED ORAL at 17:10

## 2021-08-17 RX ADMIN — METHOCARBAMOL 1000 MG: 500 TABLET ORAL at 08:14

## 2021-08-17 RX ADMIN — METHOCARBAMOL 1000 MG: 500 TABLET ORAL at 17:11

## 2021-08-17 RX ADMIN — ALLOPURINOL 300 MG: 300 TABLET ORAL at 08:14

## 2021-08-17 RX ADMIN — Medication 10 ML: at 09:57

## 2021-08-17 RX ADMIN — HYDROMORPHONE HYDROCHLORIDE 0.5 MG: 1 INJECTION, SOLUTION INTRAMUSCULAR; INTRAVENOUS; SUBCUTANEOUS at 16:11

## 2021-08-17 RX ADMIN — SODIUM CHLORIDE, PRESERVATIVE FREE 10 ML: 5 INJECTION INTRAVENOUS at 00:02

## 2021-08-17 RX ADMIN — OXYCODONE HYDROCHLORIDE 10 MG: 10 TABLET ORAL at 08:13

## 2021-08-17 RX ADMIN — ENOXAPARIN SODIUM 40 MG: 40 INJECTION SUBCUTANEOUS at 21:00

## 2021-08-17 RX ADMIN — Medication 1 TABLET: at 08:14

## 2021-08-17 RX ADMIN — AMLODIPINE BESYLATE 5 MG: 5 TABLET ORAL at 08:14

## 2021-08-17 RX ADMIN — PHENOBARBITAL 64.8 MG: 32.4 TABLET ORAL at 21:31

## 2021-08-17 RX ADMIN — Medication 100 MG: at 08:14

## 2021-08-17 RX ADMIN — POLYETHYLENE GLYCOL 3350 17 G: 17 POWDER, FOR SOLUTION ORAL at 08:14

## 2021-08-17 RX ADMIN — PHENOBARBITAL SODIUM 65 MG: 65 INJECTION INTRAMUSCULAR at 05:13

## 2021-08-17 RX ADMIN — CARVEDILOL 12.5 MG: 6.25 TABLET, FILM COATED ORAL at 08:14

## 2021-08-17 RX ADMIN — HYDROMORPHONE HYDROCHLORIDE 0.5 MG: 1 INJECTION, SOLUTION INTRAMUSCULAR; INTRAVENOUS; SUBCUTANEOUS at 09:56

## 2021-08-17 RX ADMIN — METHOCARBAMOL 1000 MG: 500 TABLET ORAL at 13:34

## 2021-08-17 RX ADMIN — PHENOBARBITAL 64.8 MG: 32.4 TABLET ORAL at 17:10

## 2021-08-17 RX ADMIN — OXYCODONE HYDROCHLORIDE 10 MG: 10 TABLET ORAL at 21:30

## 2021-08-17 RX ADMIN — OXYCODONE 5 MG: 5 TABLET ORAL at 11:38

## 2021-08-17 RX ADMIN — PHENOBARBITAL SODIUM 65 MG: 65 INJECTION INTRAMUSCULAR at 00:02

## 2021-08-17 RX ADMIN — Medication 10 ML: at 21:31

## 2021-08-17 RX ADMIN — FOLIC ACID 1 MG: 1 TABLET ORAL at 08:14

## 2021-08-17 RX ADMIN — METHOCARBAMOL 1000 MG: 500 TABLET ORAL at 21:31

## 2021-08-17 ASSESSMENT — PAIN SCALES - GENERAL
PAINLEVEL_OUTOF10: 3
PAINLEVEL_OUTOF10: 0
PAINLEVEL_OUTOF10: 9
PAINLEVEL_OUTOF10: 3
PAINLEVEL_OUTOF10: 7
PAINLEVEL_OUTOF10: 5
PAINLEVEL_OUTOF10: 7
PAINLEVEL_OUTOF10: 8
PAINLEVEL_OUTOF10: 0
PAINLEVEL_OUTOF10: 8
PAINLEVEL_OUTOF10: 6
PAINLEVEL_OUTOF10: 7

## 2021-08-17 ASSESSMENT — PAIN DESCRIPTION - ORIENTATION: ORIENTATION: RIGHT

## 2021-08-17 ASSESSMENT — PAIN DESCRIPTION - PAIN TYPE: TYPE: ACUTE PAIN

## 2021-08-17 ASSESSMENT — PAIN DESCRIPTION - DESCRIPTORS: DESCRIPTORS: ACHING

## 2021-08-17 ASSESSMENT — PAIN DESCRIPTION - LOCATION: LOCATION: HEAD

## 2021-08-17 NOTE — PLAN OF CARE
Problem: Pain:  Goal: Pain level will decrease  Description: Pain level will decrease  Outcome: Met This Shift  Goal: Control of acute pain  Description: Control of acute pain  Outcome: Met This Shift  Goal: Control of chronic pain  Description: Control of chronic pain  Outcome: Met This Shift  Goal: Patient's pain/discomfort is manageable  Description: Patient's pain/discomfort is manageable  Outcome: Met This Shift     Problem: Skin Integrity:  Goal: Will show no infection signs and symptoms  Description: Will show no infection signs and symptoms  Outcome: Met This Shift  Goal: Absence of new skin breakdown  Description: Absence of new skin breakdown  Outcome: Met This Shift     Problem: Infection:  Goal: Will remain free from infection  Description: Will remain free from infection  Outcome: Met This Shift     Problem: Safety:  Goal: Free from accidental physical injury  Description: Free from accidental physical injury  8/17/2021 1453 by Carter Orosco RN  Outcome: Met This Shift  8/17/2021 0450 by Tyrese Avalos RN  Outcome: Met This Shift  Goal: Free from intentional harm  Description: Free from intentional harm  8/17/2021 1453 by Carter Orosco RN  Outcome: Met This Shift  8/17/2021 0450 by Tyrese Avalos RN  Outcome: Met This Shift     Problem: Daily Care:  Goal: Daily care needs are met  Description: Daily care needs are met  Outcome: Met This Shift     Problem: Skin Integrity:  Goal: Skin integrity will stabilize  Description: Skin integrity will stabilize  Outcome: Met This Shift     Problem: Discharge Planning:  Goal: Patients continuum of care needs are met  Description: Patients continuum of care needs are met  Outcome: Met This Shift

## 2021-08-17 NOTE — CARE COORDINATION
GENEVA spoke with patient and his significant other, Cyndi Hudson who is at bedside. Patient lives home with significant other in a 2 story home. He does go to all levels of the home and uses a cane at home. He is currently going to outpatient therapy for balance issues and does report several falls in the last year. His PCP is Dr Reginaldo Street. He states history of TAYLOR at [facility] due to having Covid earlier this year. We have discussed transition of care and likely need for rehab at discharge and both are agreeable. They would like [facility] or [facility]. Referral made to [facility], await acceptance. SBI completed in Epic.

## 2021-08-17 NOTE — PLAN OF CARE
Problem: Safety:  Goal: Free from accidental physical injury  Outcome: Met This Shift  Goal: Free from intentional harm  Outcome: Met This Shift

## 2021-08-17 NOTE — CARE COORDINATION
Per Rashad Trujillo at [facility], they are unable to accept. Spoke with patient and his Significant other, informed them that [facility] can not accept. Have provided a list to them and they would like [facility]. Referral to [facility]. Await acceptance.

## 2021-08-17 NOTE — PROGRESS NOTES
[facility]   ATTENDING PHYSICIAN PROGRESS NOTE     I have examined the patient, reviewed the record, and discussed the case with the APN/ Resident. I have reviewed all relevant labs and imaging data. The following summarizes my clinical findings and independent assessment. CC: fall    Patient reports ongoing neck pain; states he is tolerating a diet and passing flatus; no BM yet. Awake and alert  Follows commands  Heart: Irregular  Lungs: Fairly clear bilaterally  Abdomen: Soft; bowel sounds active; nontender/nondistended  Skin: Warm/dry; sutures to scalp laceration  Extremities: Strength 5 out of 5 bilateral upper and lower extremities  C-collar in place    Patient Active Problem List    Diagnosis Date Noted    Traumatic closed fracture of [facility]2 vertebra with minimal displacement[facility] 08/15/2021    COVID-19 12/28/2020    Hypokalemia 12/28/2020    Hypomagnesemia 12/28/2020    Fever and chills 12/28/2020    Hypoxia 12/28/2020    Acute hypoxemic respiratory failure due to severe acute respiratory syndrome coronavirus 2 (SARS-CoV-2) disease ([facility] 12/28/2020    Chest pain 12/22/2019    DVT (deep venous thrombosis) ([facility] 12/20/2019    CHF with unknown LVEF ([facility] 12/20/2019       Status post fall  C2 fracture--maintain cervical collar--await custom collar  Scalp laceration--local wound care  Suspected vertebral artery injury--aspirin per vascular  Diet as tolerated  Bowel regimen  PT/OT evals  DVT risk--PCDs  Discharge planning    Sunny Boudreaux MD, FACS  8/17/2021  11:28 AM      NOTE: This report was transcribed using voice recognition software. Every effort was made to ensure accuracy; however, inadvertent computerized transcription errors may be present.

## 2021-08-17 NOTE — PROGRESS NOTES
Department of Neurosurgery  Progress Note    CHIEF COMPLAINT: odontoid fx    SUBJECTIVE:  Custom collar received. More comfortable. REVIEW OF SYSTEMS :  Constitutional: Negative for chills and fever. Neurological: Negative for dizziness, tremors and speech change.      OBJECTIVE:   VITALS:  /74   Pulse 77   Temp 98.5 °F (36.9 °C) (Temporal)   Resp 16   Ht 5' 11.5\" (1.816 m)   Wt 210 lb 8.6 oz (95.5 kg)   SpO2 96%   BMI 28.96 kg/m²     PHYSICAL:  Neurologic:  Mental Status Exam:  Level of Alertness:   awake  Orientation:   person, place, time  Motor Exam:  Motor exam is symmetrical 5 out of 5 all extremities bilaterally  Sensory:  Sensory intact  Custom collar intact  Forehead laceration       DATA:  CBC:   Lab Results   Component Value Date    WBC 7.1 08/16/2021    RBC 3.82 08/16/2021    HGB 12.9 08/16/2021    HCT 38.7 08/16/2021    .3 08/16/2021    MCH 33.8 08/16/2021    MCHC 33.3 08/16/2021    RDW 16.1 08/16/2021     08/16/2021    MPV 10.1 08/16/2021     BMP:    Lab Results   Component Value Date     08/16/2021    K 4.3 08/16/2021    CL 98 08/16/2021    CO2 27 08/16/2021    BUN 10 08/16/2021    LABALBU 3.3 08/15/2021    CREATININE 0.8 08/16/2021    CALCIUM 9.1 08/16/2021    GFRAA >60 08/16/2021    LABGLOM >60 08/16/2021    GLUCOSE 108 08/16/2021     PT/INR:    Lab Results   Component Value Date    PROTIME 12.0 12/28/2020    INR 1.0 12/28/2020     PTT:    Lab Results   Component Value Date    APTT 24.8 12/28/2020   [APTT}    Current Inpatient Medications  Current Facility-Administered Medications: enoxaparin (LOVENOX) injection 40 mg, 40 mg, Subcutaneous, BID  PHENobarbital (LUMINAL) tablet 64.8 mg, 64.8 mg, Oral, 4x Daily  labetalol (NORMODYNE;TRANDATE) injection 10 mg, 10 mg, Intravenous, Q1H PRN  hydrALAZINE (APRESOLINE) injection 5 mg, 5 mg, Intravenous, Q6H PRN  sodium chloride flush 0.9 % injection 10 mL, 10 mL, Intravenous, PRN  allopurinol (ZYLOPRIM) tablet 300 mg, 300 mg, Oral, Daily  amLODIPine (NORVASC) tablet 5 mg, 5 mg, Oral, Daily  carvedilol (COREG) tablet 12.5 mg, 12.5 mg, Oral, BID WC  folic acid (FOLVITE) tablet 1 mg, 1 mg, Oral, Daily  thiamine mononitrate tablet 100 mg, 100 mg, Oral, Daily  pantoprazole (PROTONIX) tablet 40 mg, 40 mg, Oral, QAM AC  multivitamin 1 tablet, 1 tablet, Oral, Daily  sodium chloride flush 0.9 % injection 5-40 mL, 5-40 mL, Intravenous, 2 times per day  sodium chloride flush 0.9 % injection 5-40 mL, 5-40 mL, Intravenous, PRN  0.9 % sodium chloride infusion, 25 mL, Intravenous, PRN  ondansetron (ZOFRAN-ODT) disintegrating tablet 4 mg, 4 mg, Oral, Q8H PRN **OR** ondansetron (ZOFRAN) injection 4 mg, 4 mg, Intravenous, Q6H PRN  polyethylene glycol (GLYCOLAX) packet 17 g, 17 g, Oral, Daily  senna (SENOKOT) tablet 8.6 mg, 1 tablet, Oral, Daily PRN  HYDROmorphone (DILAUDID) injection 0.5 mg, 0.5 mg, Intravenous, Q4H PRN  methocarbamol (ROBAXIN) tablet 1,000 mg, 1,000 mg, Oral, 4x Daily  oxyCODONE (ROXICODONE) immediate release tablet 5 mg, 5 mg, Oral, Q4H PRN **OR** oxyCODONE HCl (OXY-IR) immediate release tablet 10 mg, 10 mg, Oral, Q4H PRN    ASSESSMENT:   Acute type II odontoid fracture    PLAN:  -No surgical intervention. Will manage fracture in custom cervical collar x3 months.   -Pain control  -PT/OT in collar  -Follow up in neurosurgery office in 4 weeks with cervical x-rays      Electronically signed by Sudeep Bianchi PA-C on 8/17/2021 at 5:34 PM    NSx Attending:    Patient was seen and examined by me. I personally reviewed all pertinent laboratory and radiological images. I concur with Mr. Fajardo's clinical assessment and plan. Thank you so much for allowing us to participate in the care of this patient.

## 2021-08-17 NOTE — PROGRESS NOTES
Vascular Surgery Progress Note    Pt is being seen in f/u today regarding vertebral artery injury. Overall he is doing well he denies any new complaints of right-sided left-sided weakness numbness or vision changes    Subjective:  Bessy Torres is a 66 y.o. male denies any right-sided left-sided weakness numbness or vision changes. He otherwise is doing well and has no current complaints other than soreness from his recent fall    Current Medications:    sodium chloride        labetalol, hydrALAZINE, sodium chloride flush, sodium chloride flush, sodium chloride, ondansetron **OR** ondansetron, senna, HYDROmorphone, oxyCODONE **OR** oxyCODONE    enoxaparin  40 mg Subcutaneous BID    allopurinol  300 mg Oral Daily    amLODIPine  5 mg Oral Daily    carvedilol  12.5 mg Oral BID WC    folic acid  1 mg Oral Daily    thiamine mononitrate  100 mg Oral Daily    pantoprazole  40 mg Oral QAM AC    multivitamin  1 tablet Oral Daily    sodium chloride flush  5-40 mL Intravenous 2 times per day    polyethylene glycol  17 g Oral Daily    methocarbamol  1,000 mg Oral 4x Daily    PHENobarbital  65 mg Intravenous 4 times per day        PHYSICAL EXAM:    /74   Pulse 77   Temp 98.5 °F (36.9 °C) (Temporal)   Resp 16   Ht 5' 11.5\" (1.816 m)   Wt 210 lb 8.6 oz (95.5 kg)   SpO2 96%   BMI 28.96 kg/m²     Intake/Output Summary (Last 24 hours) at 8/17/2021 1035  Last data filed at 8/16/2021 2355  Gross per 24 hour   Intake 430 ml   Output 950 ml   Net -520 ml          General: Certain oriented answers questions appropriately no acute distress  Skin: Warm and dry no change in turgor no jaundice of sclerae. He still has blood stains from his recent fall. HEENT: Right-sided forehead laceration. CVS: Regular rate and rhythm  Resp: Clear to auscultation bilaterally no wheeze rales rhonchi  Abd: Soft nontender no rebound or guarding. Extremities: Bilateral palpable brachial radial pulses DP BOT are palpable. It is better than left  Neuro: Grossly intact bilateral any gross cranial nerve deficit    LABS:    Lab Results   Component Value Date    WBC 7.1 08/16/2021    HGB 12.9 08/16/2021    HCT 38.7 08/16/2021     08/16/2021    PROTIME 12.0 12/28/2020    INR 1.0 12/28/2020    APTT 24.8 12/28/2020    K 4.3 08/16/2021    BUN 10 08/16/2021    CREATININE 0.8 08/16/2021       RADIOLOGY:  XR PELVIS (1-2 VIEWS)   Final Result   No acute bony abnormality. Chronic appearing findings. MRI or CT would be   useful if symptoms persist.         XR CHEST PORTABLE   Final Result   Mild vascular congestion. Scattered areas of scarring and/or atelectasis in the lung bases. Developing   infiltrate from pneumonia at the right base not excluded. Continued   follow-up recommended. CTA NECK W CONTRAST   Final Result   Acute type 2 fracture of the odontoid process of C2. There are fractures   through the bilateral lamina of C2. Mild narrowing of the V3 segment of the right vertebral artery suggestive of   low-grade injury. Atherosclerosis in bilateral carotid bifurcations resulting in less than 50%   stenosis in the origin of right ICA and 50% stenosis in the origin of left   ICA. CT Head WO Contrast   Final Result   No acute intracranial abnormality. CT Cervical Spine WO Contrast   Final Result   Acute type 2 fracture of the odontoid process of C2. There are fractures   through the bilateral lamina of C2. Mild narrowing of the V3 segment of the right vertebral artery suggestive of   low-grade injury. Atherosclerosis in bilateral carotid bifurcations resulting in less than 50%   stenosis in the origin of right ICA and 50% stenosis in the origin of left   ICA. XR ELBOW LEFT (MIN 3 VIEWS)    (Results Pending)       ASSESSMENT/PLAN:   · 1 status post trauma with possible vertebral artery injury. At this point he is doing well recommend antiplatelet therapy.   Follow-up in 1 month        Electronically signed by Melissa Melissa MD on 8/17/2021 at 10:35 AM

## 2021-08-18 ENCOUNTER — APPOINTMENT (OUTPATIENT)
Dept: GENERAL RADIOLOGY | Age: 76
DRG: 552 | End: 2021-08-18
Payer: MEDICARE

## 2021-08-18 VITALS
SYSTOLIC BLOOD PRESSURE: 144 MMHG | HEART RATE: 93 BPM | BODY MASS INDEX: 28.52 KG/M2 | OXYGEN SATURATION: 94 % | HEIGHT: 72 IN | TEMPERATURE: 98.2 F | DIASTOLIC BLOOD PRESSURE: 81 MMHG | WEIGHT: 210.54 LBS | RESPIRATION RATE: 16 BRPM

## 2021-08-18 LAB
PHENOBARBITAL LEVEL: 10.7 MCG/ML (ref 15–40)
SARS-COV-2, NAAT: NOT DETECTED

## 2021-08-18 PROCEDURE — 2580000003 HC RX 258: Performed by: STUDENT IN AN ORGANIZED HEALTH CARE EDUCATION/TRAINING PROGRAM

## 2021-08-18 PROCEDURE — 6370000000 HC RX 637 (ALT 250 FOR IP): Performed by: STUDENT IN AN ORGANIZED HEALTH CARE EDUCATION/TRAINING PROGRAM

## 2021-08-18 PROCEDURE — 73080 X-RAY EXAM OF ELBOW: CPT

## 2021-08-18 PROCEDURE — 99238 HOSP IP/OBS DSCHRG MGMT 30/<: CPT | Performed by: SURGERY

## 2021-08-18 PROCEDURE — 99232 SBSQ HOSP IP/OBS MODERATE 35: CPT | Performed by: NEUROLOGICAL SURGERY

## 2021-08-18 PROCEDURE — 72040 X-RAY EXAM NECK SPINE 2-3 VW: CPT

## 2021-08-18 PROCEDURE — 36415 COLL VENOUS BLD VENIPUNCTURE: CPT

## 2021-08-18 PROCEDURE — 80184 ASSAY OF PHENOBARBITAL: CPT

## 2021-08-18 PROCEDURE — 6360000002 HC RX W HCPCS: Performed by: STUDENT IN AN ORGANIZED HEALTH CARE EDUCATION/TRAINING PROGRAM

## 2021-08-18 PROCEDURE — 87635 SARS-COV-2 COVID-19 AMP PRB: CPT

## 2021-08-18 RX ORDER — METHOCARBAMOL 500 MG/1
1000 TABLET, FILM COATED ORAL 4 TIMES DAILY
Qty: 80 TABLET | Refills: 0 | DISCHARGE
Start: 2021-08-18 | End: 2021-08-28

## 2021-08-18 RX ORDER — OXYCODONE HYDROCHLORIDE 10 MG/1
10 TABLET ORAL EVERY 4 HOURS PRN
Refills: 0 | Status: SHIPPED | DISCHARGE
Start: 2021-08-18 | End: 2021-08-23

## 2021-08-18 RX ORDER — SENNA PLUS 8.6 MG/1
1 TABLET ORAL DAILY PRN
DISCHARGE
Start: 2021-08-18 | End: 2021-09-17

## 2021-08-18 RX ORDER — ASPIRIN 81 MG/1
81 TABLET, CHEWABLE ORAL DAILY
Qty: 30 TABLET | Refills: 3 | DISCHARGE
Start: 2021-08-19

## 2021-08-18 RX ORDER — ASPIRIN 81 MG/1
81 TABLET, CHEWABLE ORAL DAILY
Status: DISCONTINUED | OUTPATIENT
Start: 2021-08-18 | End: 2021-08-18 | Stop reason: HOSPADM

## 2021-08-18 RX ADMIN — PHENOBARBITAL 64.8 MG: 32.4 TABLET ORAL at 08:42

## 2021-08-18 RX ADMIN — OXYCODONE HYDROCHLORIDE 10 MG: 10 TABLET ORAL at 10:12

## 2021-08-18 RX ADMIN — FOLIC ACID 1 MG: 1 TABLET ORAL at 08:42

## 2021-08-18 RX ADMIN — PANTOPRAZOLE SODIUM 40 MG: 40 TABLET, DELAYED RELEASE ORAL at 05:10

## 2021-08-18 RX ADMIN — METHOCARBAMOL 1000 MG: 500 TABLET ORAL at 12:17

## 2021-08-18 RX ADMIN — ENOXAPARIN SODIUM 40 MG: 40 INJECTION SUBCUTANEOUS at 08:42

## 2021-08-18 RX ADMIN — ALLOPURINOL 300 MG: 300 TABLET ORAL at 08:42

## 2021-08-18 RX ADMIN — ASPIRIN 81 MG: 81 TABLET, CHEWABLE ORAL at 10:13

## 2021-08-18 RX ADMIN — Medication 100 MG: at 08:42

## 2021-08-18 RX ADMIN — AMLODIPINE BESYLATE 5 MG: 5 TABLET ORAL at 08:42

## 2021-08-18 RX ADMIN — Medication 1 TABLET: at 08:42

## 2021-08-18 RX ADMIN — OXYCODONE 5 MG: 5 TABLET ORAL at 05:52

## 2021-08-18 RX ADMIN — METHOCARBAMOL 1000 MG: 500 TABLET ORAL at 08:41

## 2021-08-18 RX ADMIN — CARVEDILOL 12.5 MG: 6.25 TABLET, FILM COATED ORAL at 08:42

## 2021-08-18 RX ADMIN — Medication 10 ML: at 08:41

## 2021-08-18 ASSESSMENT — PAIN SCALES - GENERAL
PAINLEVEL_OUTOF10: 6
PAINLEVEL_OUTOF10: 9

## 2021-08-18 NOTE — PROGRESS NOTES
Department of Neurosurgery  Progress Note    CHIEF COMPLAINT: odontoid fx    SUBJECTIVE:  C/o left hand and elbow pain. Skin warm to touch. REVIEW OF SYSTEMS :  Constitutional: Negative for chills and fever. Neurological: Negative for dizziness, tremors and speech change.      OBJECTIVE:   VITALS:  BP (!) 144/81   Pulse 93   Temp 98.2 °F (36.8 °C) (Temporal)   Resp 16   Ht 5' 11.5\" (1.816 m)   Wt 210 lb 8.6 oz (95.5 kg)   SpO2 94%   BMI 28.96 kg/m²     PHYSICAL:  Neurologic:  Mental Status Exam:  Level of Alertness:   awake  Orientation:   person, place, time  Motor Exam:  Motor exam is symmetrical 5 out of 5 all extremities bilaterally  Sensory:  Sensory intact  Skin warm to touch  Custom collar intact  Forehead laceration       DATA:  CBC:   Lab Results   Component Value Date    WBC 7.1 08/16/2021    RBC 3.82 08/16/2021    HGB 12.9 08/16/2021    HCT 38.7 08/16/2021    .3 08/16/2021    MCH 33.8 08/16/2021    MCHC 33.3 08/16/2021    RDW 16.1 08/16/2021     08/16/2021    MPV 10.1 08/16/2021     BMP:    Lab Results   Component Value Date     08/16/2021    K 4.3 08/16/2021    CL 98 08/16/2021    CO2 27 08/16/2021    BUN 10 08/16/2021    LABALBU 3.3 08/15/2021    CREATININE 0.8 08/16/2021    CALCIUM 9.1 08/16/2021    GFRAA >60 08/16/2021    LABGLOM >60 08/16/2021    GLUCOSE 108 08/16/2021     PT/INR:    Lab Results   Component Value Date    PROTIME 12.0 12/28/2020    INR 1.0 12/28/2020     PTT:    Lab Results   Component Value Date    APTT 24.8 12/28/2020   [APTT}    Current Inpatient Medications  Current Facility-Administered Medications: aspirin chewable tablet 81 mg, 81 mg, Oral, Daily  enoxaparin (LOVENOX) injection 40 mg, 40 mg, Subcutaneous, BID  PHENobarbital (LUMINAL) tablet 64.8 mg, 64.8 mg, Oral, 4x Daily  labetalol (NORMODYNE;TRANDATE) injection 10 mg, 10 mg, Intravenous, Q1H PRN  hydrALAZINE (APRESOLINE) injection 5 mg, 5 mg, Intravenous, Q6H PRN  sodium chloride flush 0.9 % injection 10 mL, 10 mL, Intravenous, PRN  allopurinol (ZYLOPRIM) tablet 300 mg, 300 mg, Oral, Daily  amLODIPine (NORVASC) tablet 5 mg, 5 mg, Oral, Daily  carvedilol (COREG) tablet 12.5 mg, 12.5 mg, Oral, BID WC  folic acid (FOLVITE) tablet 1 mg, 1 mg, Oral, Daily  thiamine mononitrate tablet 100 mg, 100 mg, Oral, Daily  pantoprazole (PROTONIX) tablet 40 mg, 40 mg, Oral, QAM AC  multivitamin 1 tablet, 1 tablet, Oral, Daily  sodium chloride flush 0.9 % injection 5-40 mL, 5-40 mL, Intravenous, 2 times per day  sodium chloride flush 0.9 % injection 5-40 mL, 5-40 mL, Intravenous, PRN  0.9 % sodium chloride infusion, 25 mL, Intravenous, PRN  ondansetron (ZOFRAN-ODT) disintegrating tablet 4 mg, 4 mg, Oral, Q8H PRN **OR** ondansetron (ZOFRAN) injection 4 mg, 4 mg, Intravenous, Q6H PRN  polyethylene glycol (GLYCOLAX) packet 17 g, 17 g, Oral, Daily  senna (SENOKOT) tablet 8.6 mg, 1 tablet, Oral, Daily PRN  HYDROmorphone (DILAUDID) injection 0.5 mg, 0.5 mg, Intravenous, Q4H PRN  methocarbamol (ROBAXIN) tablet 1,000 mg, 1,000 mg, Oral, 4x Daily  oxyCODONE (ROXICODONE) immediate release tablet 5 mg, 5 mg, Oral, Q4H PRN **OR** oxyCODONE HCl (OXY-IR) immediate release tablet 10 mg, 10 mg, Oral, Q4H PRN    ASSESSMENT:   Acute type II odontoid fracture    PLAN:  -No surgical intervention. Will manage fracture in custom cervical collar x3 months.   -Cervical x-rays ordered  -Pain control  -PT/OT in collar  -Follow up in neurosurgery office in 4 weeks with cervical x-rays      Electronically signed by Martha Iniguez PA-C on 8/18/2021 at 9:48 AM     Nsx Attending:    Patient was seen and examined by me with the team.  I personally reviewed all pertinent radiological images. I concur with Miss Edward's clinical assessment and plan. Continue collar and FU with us in 4 weeks as above. Thank you so much for allowing us to participate in the care of this patient.

## 2021-08-18 NOTE — PLAN OF CARE
Problem: Pain:  Goal: Pain level will decrease  Description: Pain level will decrease  Outcome: Met This Shift  Goal: Control of acute pain  Description: Control of acute pain  Outcome: Met This Shift  Goal: Control of chronic pain  Description: Control of chronic pain  Outcome: Met This Shift  Goal: Patient's pain/discomfort is manageable  Description: Patient's pain/discomfort is manageable  Outcome: Met This Shift     Problem: Skin Integrity:  Goal: Will show no infection signs and symptoms  Description: Will show no infection signs and symptoms  Outcome: Met This Shift  Goal: Absence of new skin breakdown  Description: Absence of new skin breakdown  Outcome: Met This Shift     Problem: Infection:  Goal: Will remain free from infection  Description: Will remain free from infection  Outcome: Met This Shift     Problem: Safety:  Goal: Free from accidental physical injury  Description: Free from accidental physical injury  Outcome: Met This Shift  Goal: Free from intentional harm  Description: Free from intentional harm  Outcome: Met This Shift     Problem: Daily Care:  Goal: Daily care needs are met  Description: Daily care needs are met  Outcome: Met This Shift     Problem: Skin Integrity:  Goal: Skin integrity will stabilize  Description: Skin integrity will stabilize  Outcome: Met This Shift     Problem: Discharge Planning:  Goal: Patients continuum of care needs are met  Description: Patients continuum of care needs are met  Outcome: Met This Shift

## 2021-08-18 NOTE — DISCHARGE INSTR - COC
Continuity of Care Form    Patient Name: Merlinda Skiff   :  1945  MRN:  [id number]    Admit date:  8/15/2021  Discharge date:  ***    Code Status Order: Full Code   Advance Directives:     Admitting Physician:  Deborah Haskins MD  PCP: Eulalia Greene MD    Discharging Nurse: [facility] Unit/Room#: 7033/7952-K  Discharging Unit Phone Number: [phone]    Emergency Contact:   Extended Emergency Contact Information  Primary Emergency Contact: Fidel Aguilar  Address: [address] [place] [address] Phone: [phone]  Mobile Phone: [phone]  Relation: Domestic Partner   needed?  No    Past Surgical History:  Past Surgical History:   Procedure Laterality Date    [facility], [facility]. INSERT PICC CATH, 5/> YRS  2021            Immunization History:   Immunization History   Administered Date(s) Administered    COVID-19, Martínez Peter, PF, 30mcg/0.3mL 2021, 2021    Td (Adult), 2 Lf Tetanus Toxoid, Pf (Td, Absorbed) 08/15/2021       Active Problems:  Patient Active Problem List   Diagnosis Code    DVT (deep venous thrombosis) ([facility] I82.409    CHF with unknown LVEF ([facility] I50.9    Chest pain R07.9    COVID-19 U07.1    Hypokalemia E87.6    Hypomagnesemia E83.42    Fever and chills R50.9    Hypoxia R09.02    Acute hypoxemic respiratory failure due to severe acute respiratory syndrome coronavirus 2 (SARS-CoV-2) disease ([facility]) U07.1, J96.01    Traumatic closed fracture of C2 vertebra with minimal displacement, initial encounter ([facility] S12.100A       Isolation/Infection:   Isolation          No Isolation        Patient Infection Status     Infection Onset Added Last Indicated Last Indicated By Review Planned Expiration Resolved Resolved By    None active    Resolved    COVID-19 20 COVID-19   21     COVID-19 Rule Out 20 COVID-19 (Ordered)   20 Rule-Out Test Resulted          Nurse Assessment:  Last Vital Signs: BP (!) 144/81   Pulse 93   Temp 98.2 °F (36.8 °C) (Temporal)   Resp 16   Ht 5' 11.5\" (1.816 m)   Wt 210 lb 8.6 oz (95.5 kg)   SpO2 94%   BMI 28.96 kg/m²     Last documented pain score (0-10 scale): Pain Level: 9  Last Weight:   Wt Readings from Last 1 Encounters:   08/15/21 210 lb 8.6 oz (95.5 kg)     Mental Status:  oriented, alert, coherent and logical    IV Access:  - None    Nursing Mobility/ADLs:  Walking   Assisted  Transfer  Assisted  Bathing  Assisted  Dressing  Assisted  Toileting  Assisted  Feeding  Independent  Med Admin  Independent  Med Delivery   whole    Wound Care Documentation and Therapy:  Wound 08/15/21 Head Right;Upper; Lateral lac with sutures and steri strips (Active)   Dressing Status Other (Comment) 08/18/21 0830   Wound Cleansed Cleansed with saline 08/16/21 0020   Dressing/Treatment Open to air 08/18/21 0830   Wound Assessment Dry 08/18/21 0830   Drainage Amount None 08/18/21 0830   Drainage Description Sanguinous 08/16/21 0020   Number of days: 2        Elimination:  Continence:   · Bowel: Yes  · Bladder: Yes  Urinary Catheter: None   Colostomy/Ileostomy/Ileal Conduit: No       Date of Last BM: 8/17/21      Intake/Output Summary (Last 24 hours) at 8/18/2021 1049  Last data filed at 8/18/2021 0806  Gross per 24 hour   Intake 310 ml   Output    Net 310 ml     I/O last 3 completed shifts: In: 200 [P.O.:420; I.V.:10]  Out: -     Safety Concerns:     History of Falls (last 30 days) and At Risk for Falls    Impairments/Disabilities:      None    Nutrition Therapy:  Current Nutrition Therapy:   - Oral Diet:  General    Routes of Feeding: Oral  Liquids: Thin Liquids  Daily Fluid Restriction: no  Last Modified Barium Swallow with Video (Video Swallowing Test): not done    Treatments at the Time of Hospital Discharge:   Respiratory Treatments: n/a  Oxygen Therapy:  is not on home oxygen therapy.   Ventilator:    - No ventilator support    Rehab Therapies: Physical Therapy, Occupational Therapy and Orthotics/Prosthetics  Weight Bearing Status/Restrictions: No weight bearing restirctions  Other Medical Equipment (for information only, NOT a DME order):  walker, bedside commode and hospital bed  Other Treatments: cervical collar for 3 months       Patient's personal belongings (please select all that are sent with patient):  {[facility] DME Belongings:062606986}    RN SIGNATURE:  Electronically signed by Gilberto Baldwin RN on 8/18/21 at 10:52 AM EDT    CASE MANAGEMENT/SOCIAL WORK SECTION    Inpatient Status Date: ***    Readmission Risk Assessment Score:  Readmission Risk              Risk of Unplanned Readmission:  16           Discharging to Facility/ Agency   · Name:   · Address:  · Phone:  · Fax:    Dialysis Facility (if applicable)   · Name:  · Address:  · Dialysis Schedule:  · Phone:  · Fax:    / signature: {Esignature:[id number]}    PHYSICIAN SECTION    Prognosis: {Prognosis:9241540325}    Condition at Discharge: [address] [place] [address] Patient Condition:278660608}    Rehab Potential (if transferring to Rehab): {Prognosis:0129975419}    Recommended Labs or Other Treatments After Discharge: ***    Physician Certification: I certify the above information and transfer of Maame Geiger  is necessary for the continuing treatment of the diagnosis listed and that he requires {Admit to Appropriate Level of Care:05984} for {GREATER/LESS:947738653} 30 days.      Update Admission H&P: {P DME Changes in EDRVP:953336104}    PHYSICIAN SIGNATURE:  {Esignature:877995904}

## 2021-08-18 NOTE — CARE COORDINATION
Patient to discharge to [facility] at 1p via their facility Makayla frausto. Have called and left message for Rebel Alfonso, significant other. All discharge paperwork on soft chart.

## 2021-08-18 NOTE — PROGRESS NOTES
Patient continues to complain of left arm/elbow pain. He is now agreeable to xray. Radiology called to inform patient is not agreeable.      Kerry Whitfield RN, BSN

## 2021-08-18 NOTE — PROGRESS NOTES
Nurse to nurse report called to [facility]. Report given to Turner Patiño, all information faxed as well to [phone].  time rescheduled for 1400.      Cierra Corrales RN, BSN

## 2021-08-18 NOTE — PROGRESS NOTES
Perfect serve message sent to [facility] regarding if they would like antiplatelet therapy per vascular recommendations.      Nini Mitchell RN, BSN

## 2021-08-18 NOTE — PROGRESS NOTES
Perfect serve message sent to [facility] regarding in need of discharge order, prescriptions and med rec. Patient planned for  at 1300.      Samaria Fritz RN, BSN

## 2021-08-18 NOTE — PROGRESS NOTES
[facility]   ATTENDING PHYSICIAN PROGRESS NOTE     I have examined the patient, reviewed the record, and discussed the case with the APN/ Resident. I have reviewed all relevant labs and imaging data. The following summarizes my clinical findings and independent assessment. CC: fall    Patient reports ongoing neck pain; states he is tolerating a diet and passing flatus. Had a BM. Awake and alert  Follows commands  Heart: Irregular  Lungs: Fairly clear bilaterally  Abdomen: Soft; bowel sounds active; nontender/nondistended  Skin: Warm/dry; sutures to scalp laceration  Extremities: Strength 5 out of 5 bilateral upper and lower extremities  C-collar in place    Patient Active Problem List    Diagnosis Date Noted    Traumatic closed fracture of C2 vertebra with minimal displacement[facility] 08/15/2021    COVID-19 12/28/2020    Hypokalemia 12/28/2020    Hypomagnesemia 12/28/2020    Fever and chills 12/28/2020    Hypoxia 12/28/2020    Acute hypoxemic respiratory failure due to severe acute respiratory syndrome coronavirus 2 (SARS-CoV-2) disease ([facility] 12/28/2020    Chest pain 12/22/2019    DVT (deep venous thrombosis) ([facility] 12/20/2019    CHF with unknown LVEF ([facility] 12/20/2019       Status post fall  C2 fracture--maintain custom collar  Scalp laceration--local wound care  Suspected vertebral artery injury--aspirin per vascular  Diet as tolerated  Bowel regimen  PT/OT evals  DVT risk--PCDs  Discharge planning    Josie Larry MD, FACS  8/18/2021  2:01 PM      NOTE: This report was transcribed using voice recognition software. Every effort was made to ensure accuracy; however, inadvertent computerized transcription errors may be present.

## 2021-08-18 NOTE — CARE COORDINATION
[facility] can accept, no precert needed, but they will need a covid test prior to discharge.   Have sent perfect serve message to Trauma resident regarding possible discharge and need for covid test.

## 2021-08-18 NOTE — CARE COORDINATION
[facility] has no beds at this time. Have called to speak with Emmanuel Jacob, he states they would like [facility]. Referral to [place], await acceptance.

## 2021-08-19 ENCOUNTER — TELEPHONE (OUTPATIENT)
Dept: VASCULAR SURGERY | Age: 76
End: 2021-08-19

## 2021-08-19 DIAGNOSIS — S15.101D INJURY OF RIGHT VERTEBRAL ARTERY, SUBSEQUENT ENCOUNTER: Primary | ICD-10-CM

## 2021-08-19 LAB
ALBUMIN SERPL-MCNC: 3.7 G/DL (ref 3.5–5.2)
ALP BLD-CCNC: 55 U/L (ref 40–129)
ALT SERPL-CCNC: 14 U/L (ref 0–40)
ANION GAP SERPL CALCULATED.3IONS-SCNC: 16 MMOL/L (ref 7–16)
AST SERPL-CCNC: 23 U/L (ref 0–39)
BASOPHILS ABSOLUTE: 0.03 E9/L (ref 0–0.2)
BASOPHILS RELATIVE PERCENT: 0.4 % (ref 0–2)
BILIRUB SERPL-MCNC: 0.6 MG/DL (ref 0–1.2)
BUN BLDV-MCNC: 11 MG/DL (ref 6–23)
CALCIUM SERPL-MCNC: 8.8 MG/DL (ref 8.6–10.2)
CHLORIDE BLD-SCNC: 101 MMOL/L (ref 98–107)
CHOLESTEROL, TOTAL: 158 MG/DL (ref 0–199)
CO2: 25 MMOL/L (ref 22–29)
CREAT SERPL-MCNC: 1 MG/DL (ref 0.7–1.2)
EOSINOPHILS ABSOLUTE: 0.05 E9/L (ref 0.05–0.5)
EOSINOPHILS RELATIVE PERCENT: 0.7 % (ref 0–6)
FOLATE: >20 NG/ML (ref 4.8–24.2)
GFR AFRICAN AMERICAN: >60
GFR NON-AFRICAN AMERICAN: >60 ML/MIN/1.73
GLUCOSE BLD-MCNC: 101 MG/DL (ref 74–99)
HCT VFR BLD CALC: 36.1 % (ref 37–54)
HDLC SERPL-MCNC: 78 MG/DL
HEMOGLOBIN: 12 G/DL (ref 12.5–16.5)
IMMATURE GRANULOCYTES #: 0.04 E9/L
IMMATURE GRANULOCYTES %: 0.6 % (ref 0–5)
LDL CHOLESTEROL CALCULATED: 69 MG/DL (ref 0–99)
LYMPHOCYTES ABSOLUTE: 1.85 E9/L (ref 1.5–4)
LYMPHOCYTES RELATIVE PERCENT: 27.7 % (ref 20–42)
MCH RBC QN AUTO: 33.9 PG (ref 26–35)
MCHC RBC AUTO-ENTMCNC: 33.2 % (ref 32–34.5)
MCV RBC AUTO: 102 FL (ref 80–99.9)
MONOCYTES ABSOLUTE: 1.09 E9/L (ref 0.1–0.95)
MONOCYTES RELATIVE PERCENT: 16.3 % (ref 2–12)
NEUTROPHILS ABSOLUTE: 3.61 E9/L (ref 1.8–7.3)
NEUTROPHILS RELATIVE PERCENT: 54.3 % (ref 43–80)
PDW BLD-RTO: 15.9 FL (ref 11.5–15)
PLATELET # BLD: 209 E9/L (ref 130–450)
PMV BLD AUTO: 10.4 FL (ref 7–12)
POTASSIUM SERPL-SCNC: 3.4 MMOL/L (ref 3.5–5)
RBC # BLD: 3.54 E12/L (ref 3.8–5.8)
SODIUM BLD-SCNC: 142 MMOL/L (ref 132–146)
T4 TOTAL: 5.4 MCG/DL (ref 4.5–11.7)
TOTAL PROTEIN: 6.9 G/DL (ref 6.4–8.3)
TRIGL SERPL-MCNC: 54 MG/DL (ref 0–149)
TSH SERPL DL<=0.05 MIU/L-ACNC: 3.09 UIU/ML (ref 0.27–4.2)
VITAMIN B-12: 986 PG/ML (ref 211–946)
VITAMIN D 25-HYDROXY: 56 NG/ML (ref 30–100)
VLDLC SERPL CALC-MCNC: 11 MG/DL
WBC # BLD: 6.7 E9/L (ref 4.5–11.5)

## 2021-08-19 NOTE — TELEPHONE ENCOUNTER
Scheduled CTA neck at [facility] 9/15 at 1:00 pm, ov with Dr. Lukasz Sky 9/22 at 9:45 a.m; [facility] [place] to return my call.

## 2021-08-19 NOTE — PROGRESS NOTES
Physician Progress Note      PATIENT:               Candi Sadler  CSN #:                  [id number]  :                       1945  ADMIT DATE:       8/15/2021 12:20 AM  DISCH DATE:        2021 4:34 PM  RESPONDING  PROVIDER #:        Yvonne Daigle DO          QUERY TEXT:    Per ED note, patient had laceration repair done to Right lateral forehead   laceration. To accurately reflect the procedure performed please document the   deepest depth of laceration which was repaired: The medical record reflects the following:  Risk Factors: fall, lac to R lat forehead  Clinical Indicators: Per ED note \"Laceration repair: R lat forehead. The wound   area was irrigated with sterile water, cleansend with shur-clens and draped   in a sterile fashion. The wound area was anesthetized with Lidocaine 1% with   epinephrine. The wound was explored with the following results no foreign body   or tendon injury seen. The wound was closed with 5-0 Ethilon using   interrupted sutures. \"  Treatment: Laceration Repair    Thank you,  Josh Kwok RN, BSN, CCDS, Clinical Documentation Improvement  [phone]  Options provided:  -- Laceration repair of skin  -- Laceration repair of subcutaneous tissue  -- Laceration repair of fascia  -- Laceration repair of muscle  -- Other - I will add my own diagnosis  -- Disagree - Not applicable / Not valid  -- Disagree - Clinically unable to determine / Unknown  -- Refer to Clinical Documentation Reviewer    PROVIDER RESPONSE TEXT:    Laceration repair of skin was performed of Right lateral forehead laceration.     Query created by: Aquilino Thorne on 2021 12:35 PM      Electronically signed by:  Yvonne Daigle DO 2021 3:02 PM

## 2021-08-19 NOTE — TELEPHONE ENCOUNTER
Amita Delgadillo from [facility] [place] returned my call regarding CTA and ov with Dr. Belia Garcia

## 2021-08-21 LAB
SARS-COV-2: NOT DETECTED
SOURCE: NORMAL

## 2021-08-27 LAB
SARS-COV-2: NOT DETECTED
SOURCE: NORMAL

## 2021-08-29 LAB
SARS-COV-2: NOT DETECTED
SOURCE: NORMAL

## 2021-08-30 ENCOUNTER — TELEPHONE (OUTPATIENT)
Dept: NEUROSURGERY | Age: 76
End: 2021-08-30

## 2021-08-30 DIAGNOSIS — S12.100D CLOSED ODONTOID FRACTURE WITH ROUTINE HEALING, SUBSEQUENT ENCOUNTER: Primary | ICD-10-CM

## 2021-09-14 ENCOUNTER — OFFICE VISIT (OUTPATIENT)
Dept: NEUROSURGERY | Age: 76
End: 2021-09-14
Payer: MEDICARE

## 2021-09-14 VITALS
DIASTOLIC BLOOD PRESSURE: 56 MMHG | HEIGHT: 72 IN | OXYGEN SATURATION: 94 % | HEART RATE: 68 BPM | WEIGHT: 210 LBS | SYSTOLIC BLOOD PRESSURE: 123 MMHG | RESPIRATION RATE: 18 BRPM | BODY MASS INDEX: 28.44 KG/M2 | TEMPERATURE: 98.1 F

## 2021-09-14 DIAGNOSIS — S12.112D CLOSED NONDISPLACED ODONTOID FRACTURE WITH TYPE II MORPHOLOGY AND ROUTINE HEALING, SUBSEQUENT ENCOUNTER: Primary | ICD-10-CM

## 2021-09-14 PROCEDURE — 99213 OFFICE O/P EST LOW 20 MIN: CPT | Performed by: STUDENT IN AN ORGANIZED HEALTH CARE EDUCATION/TRAINING PROGRAM

## 2021-09-14 NOTE — PROGRESS NOTES
[facility] Follow-up     This is a 64year old male who presents to the office for a 1 month hospital follow-up s/p acute type II odontoid fracture      Subjective: Patient states he is doing alright. States the collar is uncomfortable, but that he is complaint with his collar. He denies any new symptoms including any new numbness or weakness in his upper/lower extremities.      Physical Exam:              WDWN, no apparent distress              Non-labored breathing               Vitals Stable              Alert and oriented x3              PERRL              EOMI              SHARMA well              Motor strength symmetric              Sensation to LT intact bilaterally                  Imaging: N/A     Assessment: This is a 64 y.o.  male presenting for a 1 month hospital follow-up s/p acute type ll odontoid fracture. Stable.      Plan:  -Continue to wear collar for 2 months  -OARRS reviewed  -Follow-up in neurosurgery clinic in 2     months with cervical xrays  -Call or return to neurosurgery office sooner if symptoms worsen or if new issues arise in the interim.     Electronically signed by Cam Wade on 9/14/2021 at 1:34 PM

## 2021-09-15 ENCOUNTER — HOSPITAL ENCOUNTER (OUTPATIENT)
Dept: GENERAL RADIOLOGY | Age: 76
Discharge: HOME | End: 2021-09-17
Payer: MEDICARE

## 2021-09-15 ENCOUNTER — HOSPITAL ENCOUNTER (OUTPATIENT)
Dept: CT IMAGING | Age: 76
Discharge: HOME | End: 2021-09-17
Payer: MEDICARE

## 2021-09-15 DIAGNOSIS — S15.101D INJURY OF RIGHT VERTEBRAL ARTERY, SUBSEQUENT ENCOUNTER: ICD-10-CM

## 2021-09-15 DIAGNOSIS — S12.112D CLOSED NONDISPLACED ODONTOID FRACTURE WITH TYPE II MORPHOLOGY AND ROUTINE HEALING, SUBSEQUENT ENCOUNTER: ICD-10-CM

## 2021-09-15 PROCEDURE — 70498 CT ANGIOGRAPHY NECK: CPT

## 2021-09-15 PROCEDURE — 72040 X-RAY EXAM NECK SPINE 2-3 VW: CPT

## 2021-09-15 PROCEDURE — 2580000003 HC RX 258: Performed by: RADIOLOGY

## 2021-09-15 PROCEDURE — 6360000004 HC RX CONTRAST MEDICATION: Performed by: RADIOLOGY

## 2021-09-15 RX ORDER — SODIUM CHLORIDE 0.9 % (FLUSH) 0.9 %
10 SYRINGE (ML) INJECTION PRN
Status: DISCONTINUED | OUTPATIENT
Start: 2021-09-15 | End: 2021-09-18 | Stop reason: HOSPADM

## 2021-09-15 RX ADMIN — SODIUM CHLORIDE, PRESERVATIVE FREE 10 ML: 5 INJECTION INTRAVENOUS at 13:51

## 2021-09-15 RX ADMIN — IOPAMIDOL 80 ML: 755 INJECTION, SOLUTION INTRAVENOUS at 13:51

## 2021-09-16 DIAGNOSIS — S12.100A TRAUMATIC CLOSED FRACTURE OF C2 VERTEBRA WITH MINIMAL DISPLACEMENT, INITIAL ENCOUNTER (HCC): Primary | ICD-10-CM

## 2021-09-20 ENCOUNTER — TELEPHONE (OUTPATIENT)
Dept: VASCULAR SURGERY | Age: 76
End: 2021-09-20

## 2021-09-29 ENCOUNTER — APPOINTMENT (OUTPATIENT)
Dept: GENERAL RADIOLOGY | Age: 76
DRG: 310 | End: 2021-09-29
Payer: MEDICARE

## 2021-09-29 ENCOUNTER — HOSPITAL ENCOUNTER (INPATIENT)
Age: 76
LOS: 2 days | Discharge: HOME | DRG: 310 | End: 2021-10-01
Attending: EMERGENCY MEDICINE | Admitting: INTERNAL MEDICINE
Payer: MEDICARE

## 2021-09-29 DIAGNOSIS — I48.92 ATRIAL FLUTTER WITH RAPID VENTRICULAR RESPONSE (HCC): Primary | ICD-10-CM

## 2021-09-29 LAB
ANION GAP SERPL CALCULATED.3IONS-SCNC: 14 MMOL/L (ref 7–16)
BASOPHILS ABSOLUTE: 0.03 E9/L (ref 0–0.2)
BASOPHILS RELATIVE PERCENT: 0.4 % (ref 0–2)
BUN BLDV-MCNC: 23 MG/DL (ref 6–23)
CALCIUM SERPL-MCNC: 9.9 MG/DL (ref 8.6–10.2)
CHLORIDE BLD-SCNC: 106 MMOL/L (ref 98–107)
CO2: 23 MMOL/L (ref 22–29)
CREAT SERPL-MCNC: 1 MG/DL (ref 0.7–1.2)
EOSINOPHILS ABSOLUTE: 0.09 E9/L (ref 0.05–0.5)
EOSINOPHILS RELATIVE PERCENT: 1.3 % (ref 0–6)
GFR AFRICAN AMERICAN: >60
GFR NON-AFRICAN AMERICAN: >60 ML/MIN/1.73
GLUCOSE BLD-MCNC: 83 MG/DL (ref 74–99)
HCT VFR BLD CALC: 40.6 % (ref 37–54)
HEMOGLOBIN: 12.8 G/DL (ref 12.5–16.5)
IMMATURE GRANULOCYTES #: 0.02 E9/L
IMMATURE GRANULOCYTES %: 0.3 % (ref 0–5)
LYMPHOCYTES ABSOLUTE: 2.05 E9/L (ref 1.5–4)
LYMPHOCYTES RELATIVE PERCENT: 30.2 % (ref 20–42)
MCH RBC QN AUTO: 34.2 PG (ref 26–35)
MCHC RBC AUTO-ENTMCNC: 31.5 % (ref 32–34.5)
MCV RBC AUTO: 108.6 FL (ref 80–99.9)
MONOCYTES ABSOLUTE: 0.81 E9/L (ref 0.1–0.95)
MONOCYTES RELATIVE PERCENT: 11.9 % (ref 2–12)
NEUTROPHILS ABSOLUTE: 3.79 E9/L (ref 1.8–7.3)
NEUTROPHILS RELATIVE PERCENT: 55.9 % (ref 43–80)
PDW BLD-RTO: 14 FL (ref 11.5–15)
PLATELET # BLD: 241 E9/L (ref 130–450)
PMV BLD AUTO: 9.9 FL (ref 7–12)
POTASSIUM SERPL-SCNC: 4.3 MMOL/L (ref 3.5–5)
PRO-BNP: 4185 PG/ML (ref 0–450)
RBC # BLD: 3.74 E12/L (ref 3.8–5.8)
SARS-COV-2, NAAT: NOT DETECTED
SODIUM BLD-SCNC: 143 MMOL/L (ref 132–146)
TROPONIN, HIGH SENSITIVITY: 46 NG/L (ref 0–11)
TROPONIN, HIGH SENSITIVITY: 47 NG/L (ref 0–11)
TSH SERPL DL<=0.05 MIU/L-ACNC: 2.59 UIU/ML (ref 0.27–4.2)
WBC # BLD: 6.8 E9/L (ref 4.5–11.5)

## 2021-09-29 PROCEDURE — 2500000003 HC RX 250 WO HCPCS: Performed by: EMERGENCY MEDICINE

## 2021-09-29 PROCEDURE — 71045 X-RAY EXAM CHEST 1 VIEW: CPT

## 2021-09-29 PROCEDURE — 99285 EMERGENCY DEPT VISIT HI MDM: CPT

## 2021-09-29 PROCEDURE — 83880 ASSAY OF NATRIURETIC PEPTIDE: CPT

## 2021-09-29 PROCEDURE — 2060000000 HC ICU INTERMEDIATE R&B

## 2021-09-29 PROCEDURE — 6370000000 HC RX 637 (ALT 250 FOR IP): Performed by: EMERGENCY MEDICINE

## 2021-09-29 PROCEDURE — 85025 COMPLETE CBC W/AUTO DIFF WBC: CPT

## 2021-09-29 PROCEDURE — 80048 BASIC METABOLIC PNL TOTAL CA: CPT

## 2021-09-29 PROCEDURE — 84443 ASSAY THYROID STIM HORMONE: CPT

## 2021-09-29 PROCEDURE — 93005 ELECTROCARDIOGRAM TRACING: CPT | Performed by: EMERGENCY MEDICINE

## 2021-09-29 PROCEDURE — 84484 ASSAY OF TROPONIN QUANT: CPT

## 2021-09-29 PROCEDURE — 96374 THER/PROPH/DIAG INJ IV PUSH: CPT

## 2021-09-29 PROCEDURE — 87635 SARS-COV-2 COVID-19 AMP PRB: CPT

## 2021-09-29 RX ORDER — DILTIAZEM HYDROCHLORIDE 5 MG/ML
20 INJECTION INTRAVENOUS ONCE
Status: COMPLETED | OUTPATIENT
Start: 2021-09-29 | End: 2021-09-29

## 2021-09-29 RX ORDER — HYDROCODONE BITARTRATE AND ACETAMINOPHEN 5; 325 MG/1; MG/1
1 TABLET ORAL ONCE
Status: COMPLETED | OUTPATIENT
Start: 2021-09-29 | End: 2021-09-29

## 2021-09-29 RX ORDER — ALLOPURINOL 300 MG/1
300 TABLET ORAL DAILY
Status: DISCONTINUED | OUTPATIENT
Start: 2021-09-30 | End: 2021-10-01 | Stop reason: HOSPADM

## 2021-09-29 RX ORDER — DILTIAZEM HYDROCHLORIDE 5 MG/ML
10 INJECTION INTRAVENOUS ONCE
Status: COMPLETED | OUTPATIENT
Start: 2021-09-29 | End: 2021-09-29

## 2021-09-29 RX ORDER — PANTOPRAZOLE SODIUM 40 MG/1
40 TABLET, DELAYED RELEASE ORAL
Status: DISCONTINUED | OUTPATIENT
Start: 2021-09-30 | End: 2021-10-01 | Stop reason: HOSPADM

## 2021-09-29 RX ORDER — FOLIC ACID 1 MG/1
1 TABLET ORAL DAILY
Status: DISCONTINUED | OUTPATIENT
Start: 2021-09-30 | End: 2021-10-01 | Stop reason: HOSPADM

## 2021-09-29 RX ORDER — LOPERAMIDE HYDROCHLORIDE 2 MG/1
2 CAPSULE ORAL 4 TIMES DAILY PRN
Status: DISCONTINUED | OUTPATIENT
Start: 2021-09-29 | End: 2021-10-01 | Stop reason: HOSPADM

## 2021-09-29 RX ORDER — SODIUM CHLORIDE 0.9 % (FLUSH) 0.9 %
10 SYRINGE (ML) INJECTION PRN
Status: DISCONTINUED | OUTPATIENT
Start: 2021-09-29 | End: 2021-10-01 | Stop reason: HOSPADM

## 2021-09-29 RX ORDER — LANOLIN ALCOHOL/MO/W.PET/CERES
100 CREAM (GRAM) TOPICAL DAILY
Status: DISCONTINUED | OUTPATIENT
Start: 2021-09-30 | End: 2021-10-01 | Stop reason: HOSPADM

## 2021-09-29 RX ORDER — CLOPIDOGREL BISULFATE 75 MG/1
75 TABLET ORAL DAILY
Status: DISCONTINUED | OUTPATIENT
Start: 2021-09-30 | End: 2021-10-01 | Stop reason: HOSPADM

## 2021-09-29 RX ORDER — CARVEDILOL 6.25 MG/1
12.5 TABLET ORAL 2 TIMES DAILY WITH MEALS
Status: DISCONTINUED | OUTPATIENT
Start: 2021-09-30 | End: 2021-10-01 | Stop reason: HOSPADM

## 2021-09-29 RX ADMIN — DILTIAZEM HYDROCHLORIDE 20 MG: 5 INJECTION INTRAVENOUS at 21:02

## 2021-09-29 RX ADMIN — HYDROCODONE BITARTRATE AND ACETAMINOPHEN 1 TABLET: 5; 325 TABLET ORAL at 22:57

## 2021-09-29 RX ADMIN — DILTIAZEM HYDROCHLORIDE 10 MG: 5 INJECTION INTRAVENOUS at 16:33

## 2021-09-29 RX ADMIN — DEXTROSE MONOHYDRATE 5 MG/HR: 50 INJECTION, SOLUTION INTRAVENOUS at 16:48

## 2021-09-29 ASSESSMENT — ENCOUNTER SYMPTOMS
EYE REDNESS: 0
HEMOPTYSIS: 0
VOMITING: 0
EYE PAIN: 0
SORE THROAT: 0
DIARRHEA: 0
NAUSEA: 0
EYE DISCHARGE: 0
BACK PAIN: 0
WHEEZING: 0
SHORTNESS OF BREATH: 0
COUGH: 0
ABDOMINAL PAIN: 0
SINUS PRESSURE: 0

## 2021-09-29 ASSESSMENT — PAIN DESCRIPTION - ONSET
ONSET: ON-GOING
ONSET: ON-GOING

## 2021-09-29 ASSESSMENT — PAIN SCALES - GENERAL
PAINLEVEL_OUTOF10: 7
PAINLEVEL_OUTOF10: 6
PAINLEVEL_OUTOF10: 6

## 2021-09-29 ASSESSMENT — PAIN DESCRIPTION - DESCRIPTORS
DESCRIPTORS: CONSTANT
DESCRIPTORS: CONSTANT

## 2021-09-29 ASSESSMENT — PAIN DESCRIPTION - PAIN TYPE
TYPE: CHRONIC PAIN

## 2021-09-29 ASSESSMENT — PAIN DESCRIPTION - LOCATION
LOCATION: NECK
LOCATION: NECK

## 2021-09-29 NOTE — ED PROVIDER NOTES
77-year-old male presents to the emergency department with palpitations. He was at his primary care physician for evaluation for the palpitations and they thought he was in A. fib or a flutter and sent him in for further evaluation. He does have a history of having previous COVID-19 disease as well as a DVT prompting him to be placed on Eliquis. The patient also 2 weeks ago had a traumatic fracture of C2 with minimal displacement for which she was admitted for he continues to be in a c-collar for. He states no chest pain shortness of breath nausea vomiting diarrhea abdominal pain urinary symptoms or leg swelling. The history is provided by the patient. Palpitations  Palpitations quality:  Fast  Onset quality:  Gradual  Timing:  Intermittent  Progression:  Waxing and waning  Chronicity:  New  Relieved by:  Nothing  Worsened by:  Nothing  Ineffective treatments:  None tried  Associated symptoms: no back pain, no chest pain, no chest pressure, no cough, no diaphoresis, no hemoptysis, no lower extremity edema, no nausea, no shortness of breath, no vomiting and no weakness    Risk factors: hx of DVT    Risk factors: no hx of atrial fibrillation         Review of Systems   Constitutional: Negative for chills, diaphoresis and fever. HENT: Negative for ear pain, sinus pressure and sore throat. Eyes: Negative for pain, discharge and redness. Respiratory: Negative for cough, hemoptysis, shortness of breath and wheezing. Cardiovascular: Positive for palpitations. Negative for chest pain. Gastrointestinal: Negative for abdominal pain, diarrhea, nausea and vomiting. Genitourinary: Negative for dysuria and frequency. Musculoskeletal: Negative for arthralgias and back pain. Skin: Negative for rash and wound. Neurological: Negative for weakness and headaches. Hematological: Negative for adenopathy. All other systems reviewed and are negative.        Physical Exam  Constitutional:       Appearance: Normal appearance. He is obese. HENT:      Head: Normocephalic and atraumatic. Nose: Nose normal.      Mouth/Throat:      Mouth: Mucous membranes are moist.   Eyes:      Extraocular Movements: Extraocular movements intact. Pupils: Pupils are equal, round, and reactive to light. Cardiovascular:      Rate and Rhythm: Regular rhythm. Tachycardia present. Pulses: Normal pulses. Heart sounds: Normal heart sounds. Pulmonary:      Effort: Pulmonary effort is normal.      Breath sounds: Normal breath sounds. Abdominal:      General: Abdomen is flat. Bowel sounds are normal. There is no distension. Palpations: Abdomen is soft. Tenderness: There is no abdominal tenderness. Musculoskeletal:         General: Normal range of motion. Right lower leg: No edema. Left lower leg: No edema. Skin:     General: Skin is warm. Capillary Refill: Capillary refill takes less than 2 seconds. Neurological:      General: No focal deficit present. Mental Status: He is alert and oriented to person, place, and time. Procedures     MDM  Number of Diagnoses or Management Options  Diagnosis management comments: 41-year-old male presents to the emergency department palpitations. He was seen in his primary care doctor's office earlier today and an EKG was done showing evidence of atrial flutter with rapid ventricular response. Patient is already on Eliquis for history of blood clots. This was a new finding no further cardiac rhythm. During patient's work-up here labs were performed revealing an elevated BNP and elevated troponin but not critical elevations. Cardizem with Cardizem bolus and drip were initiated. Consult port patient warranted admission and case discussed with Dr. Vin Foss who will admit for Dr. Juan Choi. ED Course as of Sep 30 0725   Wed Sep 29, 2021   1525 EKG: This EKG is signed and interpreted by the EP.     Time: 15:16  Rate: 146  Rhythm: Sinus v Atrial flutter  Interpretation: Atrial Flutter with RVR v Sinus tachycardia  Comparison: changes as compared to patient's most recent EKG      [CF]      ED Course User Index  [CF] [place] Juani DO        --------------------------------------------- PAST HISTORY ---------------------------------------------  Past Medical History:  has a past medical history of CHF (congestive heart failure) ([facility] [address] and Gout. Past Surgical History:  has a past surgical history that includes Insert Picc Cath, 5/> Yrs (1/2/2021). Social History:  reports that he has never smoked. He has never used smokeless tobacco. He reports current alcohol use. He reports that he does not use drugs. Family History: family history includes Breast Cancer in his mother; No Known Problems in his brother, sister, and sister. The patients home medications have been reviewed.     Allergies: Levofloxacin    -------------------------------------------------- RESULTS -------------------------------------------------    Lab  Results for orders placed or performed during the hospital encounter of 09/29/21   COVID-19, Rapid    Specimen: Nasopharyngeal Swab   Result Value Ref Range    SARS-CoV-2, NAAT Not Detected Not Detected   CBC Auto Differential   Result Value Ref Range    WBC 6.8 4.5 - 11.5 E9/L    RBC 3.74 (L) 3.80 - 5.80 E12/L    Hemoglobin 12.8 12.5 - 16.5 g/dL    Hematocrit 40.6 37.0 - 54.0 %    .6 (H) 80.0 - 99.9 fL    MCH 34.2 26.0 - 35.0 pg    MCHC 31.5 (L) 32.0 - 34.5 %    RDW 14.0 11.5 - 15.0 fL    Platelets 607 702 - 615 E9/L    MPV 9.9 7.0 - 12.0 fL    Neutrophils % 55.9 43.0 - 80.0 %    Immature Granulocytes % 0.3 0.0 - 5.0 %    Lymphocytes % 30.2 20.0 - 42.0 %    Monocytes % 11.9 2.0 - 12.0 %    Eosinophils % 1.3 0.0 - 6.0 %    Basophils % 0.4 0.0 - 2.0 %    Neutrophils Absolute 3.79 1.80 - 7.30 E9/L    Immature Granulocytes # 0.02 E9/L    Lymphocytes Absolute 2.05 1.50 - 4.00 E9/L    Monocytes Absolute 0.81 0.10 - 0.95 E9/L Eosinophils Absolute 0.09 0.05 - 0.50 E9/L    Basophils Absolute 0.03 0.00 - 0.20 G3/Z   Basic Metabolic Panel   Result Value Ref Range    Sodium 143 132 - 146 mmol/L    Potassium 4.3 3.5 - 5.0 mmol/L    Chloride 106 98 - 107 mmol/L    CO2 23 22 - 29 mmol/L    Anion Gap 14 7 - 16 mmol/L    Glucose 83 74 - 99 mg/dL    BUN 23 6 - 23 mg/dL    CREATININE 1.0 0.7 - 1.2 mg/dL    GFR Non-African American >60 >=60 mL/min/1.73    GFR African American >60     Calcium 9.9 8.6 - 10.2 mg/dL   Troponin   Result Value Ref Range    Troponin, High Sensitivity 47 (H) 0 - 11 ng/L   Brain Natriuretic Peptide   Result Value Ref Range    Pro-BNP 4,185 (H) 0 - 450 pg/mL   TSH without Reflex   Result Value Ref Range    TSH 2.590 0.270 - 4.200 uIU/mL   Troponin   Result Value Ref Range    Troponin, High Sensitivity 46 (H) 0 - 11 ng/L   EKG 12 Lead   Result Value Ref Range    Ventricular Rate 146 BPM    Atrial Rate 146 BPM    P-R Interval 122 ms    QRS Duration 102 ms    Q-T Interval 270 ms    QTc Calculation (Bazett) 420 ms    P Axis 77 degrees    R Axis -54 degrees    T Axis 41 degrees       Radiology  XR CERVICAL SPINE (2-3 VIEWS)    Result Date: 9/15/2021  EXAMINATION: 3 XRAY VIEWS OF THE CERVICAL SPINE 9/15/2021 1:08 pm COMPARISON: 08/18/2021. HISTORY: ORDERING SYSTEM PROVIDED HISTORY: Closed nondisplaced odontoid fracture with type II morphology and routine healing, subsequent encounter TECHNOLOGIST PROVIDED HISTORY: Reason for exam:->s/p type ll odontoid fracture FINDINGS: Three views of the cervical spine were obtained. There is a mildly displaced fracture of the odontoid. There is also a nondisplaced lamina fracture at C2. There is mild anterior malalignment of C4 on C5. This finding is probably related to facet arthritis. Vertebral alignment is otherwise normal.  There is partial developmental fusion of the C2 and C3 vertebrae and the C5 and C6 vertebrae.   There are disc space narrowing and discovertebral degenerative changes at C4-5 and C6-7. No prevertebral soft tissue swelling is seen. Mildly displaced odontoid fracture and nondisplaced C2 lamina fracture. Other findings as described. CTA NECK W CONTRAST    Result Date: 9/15/2021  EXAMINATION: CTA OF THE NECK 9/15/2021 12:55 pm TECHNIQUE: CTA of the neck was performed with the administration of intravenous contrast. Multiplanar reformatted images are provided for review. MIP images are provided for review. Stenosis of the internal carotid arteries measured using NASCET criteria. Dose modulation, iterative reconstruction, and/or weight based adjustment of the mA/kV was utilized to reduce the radiation dose to as low as reasonably achievable. COMPARISON: CTA of the neck, 08/15/2021. HISTORY: ORDERING SYSTEM PROVIDED HISTORY: Injury of right vertebral artery, subsequent encounter FINDINGS: AORTIC ARCH/ARCH VESSELS: No dissection or arterial injury. No significant stenosis of the brachiocephalic or subclavian arteries. CAROTID ARTERIES: Atherosclerosis, predominantly calcified, seen along the carotid bulbs bilaterally, greater on the left. Approximately 50% stenosis at the origin of the left ICA (series 4 image 117). Less than 50% stenosis of the origin of the right ICA. VERTEBRAL ARTERIES: No dissection, arterial injury, or significant stenosis. SOFT TISSUES: The lung apices are clear. No cervical or superior mediastinal lymphadenopathy. The larynx and pharynx are unremarkable. No acute abnormality of the salivary and thyroid glands. BONES: No acute osseous abnormality. Redemonstration of fractures of the C2 vertebra, involving the base of the dense (type 2) as well as the bilateral posterior C2 lamina. 1. No contour abnormality indicative of dissection or injury in the carotid or vertebral [id number]. 2. Approximately 50% stenosis at the origin of the left ICA.  3. Persistent nondisplaced fractures through the base of the C2 dens (type 2) as well as the posterior bilateral C2 laminae. EKG: This EKG is signed and interpreted by me. Rate: 146  Rhythm: Atrial flutter and Rapid ventricular response  Interpretation: atrial flutter (new onset)  Comparison: changes compared to previous EKG      ------------------------- NURSING NOTES AND VITALS REVIEWED ---------------------------  Date / Time Roomed:  9/29/2021  1:56 PM  ED Bed Assignment:  [id number]    The nursing notes within the ED encounter and vital signs as below have been reviewed.    Patient Vitals for the past 24 hrs:   BP Temp Temp src Pulse Resp SpO2 Height Weight   09/30/21 0132 -- -- -- -- -- -- -- 211 lb 3.2 oz (95.8 kg)   09/29/21 2330 128/78 97.8 °F (36.6 °C) Oral 72 16 92 % 6' (1.829 m) 210 lb (95.3 kg)   09/29/21 2250 131/77 98.1 °F (36.7 °C) Oral 101 16 -- -- --   09/29/21 2145 115/71 -- -- 82 16 93 % -- --   09/29/21 2107 107/64 97.9 °F (36.6 °C) Oral 100 16 (!) 87 % -- --   09/29/21 2052 127/85 -- -- 153 -- 95 % -- --   09/29/21 2045 120/73 -- -- 136 -- 94 % -- --   09/29/21 2000 103/60 -- -- 132 -- 97 % -- --   09/29/21 1951 110/69 -- -- 136 16 95 % -- --   09/29/21 1945 117/83 -- -- 153 -- 96 % -- --   09/29/21 1931 112/76 98.1 °F (36.7 °C) Oral 150 16 96 % -- --   09/29/21 1836 103/79 -- -- 101 14 94 % -- --   09/29/21 1830 114/80 -- -- 100 -- 93 % -- --   09/29/21 1820 104/82 -- -- 103 -- 94 % -- --   09/29/21 1805 131/89 -- -- 111 -- 94 % -- --   09/29/21 1720 (!) 118/95 -- -- 132 16 95 % -- --   09/29/21 1647 117/85 -- -- 107 16 94 % -- --   09/29/21 1637 119/84 -- -- 148 16 97 % -- --   09/29/21 1557 (!) 134/91 -- -- 148 16 96 % -- --   09/29/21 1513 133/77 98.7 °F (37.1 °C) -- 146 14 97 % -- 200 lb (90.7 kg)       Oxygen Saturation Interpretation: Normal      ------------------------------------------ PROGRESS NOTES ------------------------------------------  I have spoken with the patient and discussed todays results, in addition to providing specific details for the plan of care and counseling regarding the diagnosis and prognosis. Their questions are answered at this time and they are agreeable with the plan.      --------------------------------- ADDITIONAL PROVIDER NOTES ---------------------------------  This patient's ED course included: a personal history and physicial examination, re-evaluation prior to disposition, multiple bedside re-evaluations, IV medications, cardiac monitoring, continuous pulse oximetry and complex medical decision making and emergency management    This patient has remained hemodynamically stable during their ED course. Please note that the withdrawal or failure to initiate urgent interventions for this patient would likely result in a life threatening deterioration or permanent disability. Accordingly this patient received 30 minutes of critical care time, excluding separately billable procedures. Clinical Impression  1. Atrial flutter with rapid ventricular response ([address]          Disposition  Patient's disposition: Admit to telemetry  Patient's condition is fair.        Cynthia Leonard DO  09/30/21 0725

## 2021-09-29 NOTE — ED NOTES
Assumed care of patient. Pt lying in bed in no stable condition. No visitors at bedside.      Manuel Greenwood RN  09/29/21 1931

## 2021-09-29 NOTE — ED NOTES
Bed: 02  Expected date:   Expected time:   Means of arrival:   Comments:  EMS     Elliott Van RN  09/29/21 [id number]

## 2021-09-30 LAB
ANION GAP SERPL CALCULATED.3IONS-SCNC: 13 MMOL/L (ref 7–16)
BUN BLDV-MCNC: 21 MG/DL (ref 6–23)
CALCIUM SERPL-MCNC: 9.7 MG/DL (ref 8.6–10.2)
CHLORIDE BLD-SCNC: 108 MMOL/L (ref 98–107)
CO2: 23 MMOL/L (ref 22–29)
CREAT SERPL-MCNC: 1 MG/DL (ref 0.7–1.2)
EKG ATRIAL RATE: 146 BPM
EKG P AXIS: 77 DEGREES
EKG P-R INTERVAL: 122 MS
EKG Q-T INTERVAL: 270 MS
EKG QRS DURATION: 102 MS
EKG QTC CALCULATION (BAZETT): 420 MS
EKG R AXIS: -54 DEGREES
EKG T AXIS: 41 DEGREES
EKG VENTRICULAR RATE: 146 BPM
GFR AFRICAN AMERICAN: >60
GFR NON-AFRICAN AMERICAN: >60 ML/MIN/1.73
GLUCOSE BLD-MCNC: 147 MG/DL (ref 74–99)
HCT VFR BLD CALC: 35.9 % (ref 37–54)
HEMOGLOBIN: 11.4 G/DL (ref 12.5–16.5)
LV EF: 55 %
LVEF MODALITY: NORMAL
MCH RBC QN AUTO: 35 PG (ref 26–35)
MCHC RBC AUTO-ENTMCNC: 31.8 % (ref 32–34.5)
MCV RBC AUTO: 110.1 FL (ref 80–99.9)
PDW BLD-RTO: 14 FL (ref 11.5–15)
PLATELET # BLD: 227 E9/L (ref 130–450)
PMV BLD AUTO: 10.1 FL (ref 7–12)
POTASSIUM SERPL-SCNC: 4.2 MMOL/L (ref 3.5–5)
RBC # BLD: 3.26 E12/L (ref 3.8–5.8)
SODIUM BLD-SCNC: 144 MMOL/L (ref 132–146)
WBC # BLD: 5.8 E9/L (ref 4.5–11.5)

## 2021-09-30 PROCEDURE — 2060000000 HC ICU INTERMEDIATE R&B

## 2021-09-30 PROCEDURE — 85027 COMPLETE CBC AUTOMATED: CPT

## 2021-09-30 PROCEDURE — 93306 TTE W/DOPPLER COMPLETE: CPT

## 2021-09-30 PROCEDURE — 6370000000 HC RX 637 (ALT 250 FOR IP): Performed by: INTERNAL MEDICINE

## 2021-09-30 PROCEDURE — 36415 COLL VENOUS BLD VENIPUNCTURE: CPT

## 2021-09-30 PROCEDURE — 93010 ELECTROCARDIOGRAM REPORT: CPT | Performed by: INTERNAL MEDICINE

## 2021-09-30 PROCEDURE — 2500000003 HC RX 250 WO HCPCS: Performed by: INTERNAL MEDICINE

## 2021-09-30 PROCEDURE — 80048 BASIC METABOLIC PNL TOTAL CA: CPT

## 2021-09-30 PROCEDURE — 2580000003 HC RX 258: Performed by: INTERNAL MEDICINE

## 2021-09-30 RX ORDER — AMIODARONE HYDROCHLORIDE 200 MG/1
200 TABLET ORAL DAILY
Status: DISCONTINUED | OUTPATIENT
Start: 2021-09-30 | End: 2021-10-01 | Stop reason: HOSPADM

## 2021-09-30 RX ORDER — ACETAMINOPHEN 325 MG/1
650 TABLET ORAL EVERY 4 HOURS PRN
Status: DISCONTINUED | OUTPATIENT
Start: 2021-09-30 | End: 2021-10-01 | Stop reason: HOSPADM

## 2021-09-30 RX ORDER — HYDROCODONE BITARTRATE AND ACETAMINOPHEN 7.5; 325 MG/1; MG/1
1 TABLET ORAL EVERY 6 HOURS PRN
Status: DISCONTINUED | OUTPATIENT
Start: 2021-09-30 | End: 2021-10-01 | Stop reason: HOSPADM

## 2021-09-30 RX ADMIN — CLOPIDOGREL BISULFATE 75 MG: 75 TABLET ORAL at 09:08

## 2021-09-30 RX ADMIN — APIXABAN 5 MG: 5 TABLET, FILM COATED ORAL at 00:48

## 2021-09-30 RX ADMIN — ACETAMINOPHEN 325 MG: 325 TABLET ORAL at 12:01

## 2021-09-30 RX ADMIN — CARVEDILOL 12.5 MG: 6.25 TABLET, FILM COATED ORAL at 09:08

## 2021-09-30 RX ADMIN — AMIODARONE HYDROCHLORIDE 200 MG: 200 TABLET ORAL at 12:03

## 2021-09-30 RX ADMIN — Medication 400 MG: at 09:08

## 2021-09-30 RX ADMIN — PANTOPRAZOLE SODIUM 40 MG: 40 TABLET, DELAYED RELEASE ORAL at 06:21

## 2021-09-30 RX ADMIN — ALLOPURINOL 300 MG: 300 TABLET ORAL at 09:12

## 2021-09-30 RX ADMIN — SODIUM CHLORIDE, PRESERVATIVE FREE 10 ML: 5 INJECTION INTRAVENOUS at 09:08

## 2021-09-30 RX ADMIN — HYDROCODONE BITARTRATE AND ACETAMINOPHEN 1 TABLET: 7.5; 325 TABLET ORAL at 21:49

## 2021-09-30 RX ADMIN — CARVEDILOL 12.5 MG: 6.25 TABLET, FILM COATED ORAL at 15:35

## 2021-09-30 RX ADMIN — HYDROCODONE BITARTRATE AND ACETAMINOPHEN 1 TABLET: 7.5; 325 TABLET ORAL at 15:35

## 2021-09-30 RX ADMIN — DEXTROSE MONOHYDRATE 15 MG/HR: 50 INJECTION, SOLUTION INTRAVENOUS at 00:43

## 2021-09-30 RX ADMIN — Medication 100 MG: at 09:08

## 2021-09-30 RX ADMIN — APIXABAN 5 MG: 5 TABLET, FILM COATED ORAL at 21:49

## 2021-09-30 RX ADMIN — FOLIC ACID 1 MG: 1 TABLET ORAL at 09:08

## 2021-09-30 RX ADMIN — SODIUM CHLORIDE, PRESERVATIVE FREE 10 ML: 5 INJECTION INTRAVENOUS at 21:49

## 2021-09-30 RX ADMIN — APIXABAN 5 MG: 5 TABLET, FILM COATED ORAL at 09:08

## 2021-09-30 ASSESSMENT — PAIN SCALES - GENERAL
PAINLEVEL_OUTOF10: 6
PAINLEVEL_OUTOF10: 0
PAINLEVEL_OUTOF10: 7
PAINLEVEL_OUTOF10: 6

## 2021-09-30 ASSESSMENT — PAIN - FUNCTIONAL ASSESSMENT: PAIN_FUNCTIONAL_ASSESSMENT: PREVENTS OR INTERFERES SOME ACTIVE ACTIVITIES AND ADLS

## 2021-09-30 ASSESSMENT — PAIN DESCRIPTION - PAIN TYPE: TYPE: CHRONIC PAIN

## 2021-09-30 ASSESSMENT — PAIN DESCRIPTION - PROGRESSION: CLINICAL_PROGRESSION: NOT CHANGED

## 2021-09-30 ASSESSMENT — PAIN DESCRIPTION - FREQUENCY: FREQUENCY: CONTINUOUS

## 2021-09-30 ASSESSMENT — PAIN DESCRIPTION - ONSET: ONSET: ON-GOING

## 2021-09-30 ASSESSMENT — PAIN DESCRIPTION - DESCRIPTORS: DESCRIPTORS: CONSTANT;DISCOMFORT

## 2021-09-30 ASSESSMENT — PAIN DESCRIPTION - LOCATION: LOCATION: NECK

## 2021-09-30 NOTE — PROGRESS NOTES
Patient fluctuated between a-flutter and a-fib. Patient had a 2.6 second pause around 0230 and converted to sinus rhythm with an irregular rate. Heart rate was in 50s and Cardizem drip was paused.     Electronically signed by Sowmya Olsen RN on 9/30/21 at 2:46 AM EDT

## 2021-09-30 NOTE — CONSULTS
CARDIOLOGY CONSULTATION    Patient Name:  Na Garcia    :  1945    Reason for Consultation:   Francisco Ja Billy with RVR, history of CHF with EF 28%    History of Present Illness:   Na Garcia presents to [facility] after his [place] [id number] nurse noticed that he had an irregular heart rate and referred him to the ER. He was found here to be in atrial flutter with RVR. Was started on cardizem drip with spontaneous conversion to NSR last night and drip was stopped. He denies feeling/having felt irregular rhythm throughout this admission and states that he feels fine. Presently he denies palpitations, SOB, chest discomfort, or increase in lower extremity edema. Patient reports falling and fracturing his neck 3 months ago- on chart review it appears this occurred in the middle of August this year. Does not remember why he fell at that time but thinks he may have lost consciousness and fell. He is wearing a cervical spine collar now. Past Medical History:   has a past medical history of CHF (congestive heart failure) ([address] and Gout. Surgical History:   has a past surgical history that includes Insert Picc Cath, 5/> Yrs (2021). Social History:   reports that he has never smoked. He has never used smokeless tobacco. He reports current alcohol use. He reports that he does not use drugs. Family History:  family history includes Breast Cancer in his mother; No Known Problems in his brother, sister, and sister. Medications:  Prior to Admission medications    Medication Sig Start Date End Date Taking?  Authorizing Provider   aspirin 81 MG chewable tablet Take 1 tablet by mouth daily 21   ROSALBA Broussard NP   thiamine 100 MG tablet Take 1 tablet by mouth daily 21   [facility] Darren Bowman,    pantoprazole (PROTONIX) 40 MG tablet Take 1 tablet by mouth every morning (before breakfast) 21   Spike Lopez, DO   Multiple Vitamin (MULTIVITAMIN) TABS tablet Take 1 tablet by mouth daily 1/8/21   Lyn Knowles, DO   clopidogrel (PLAVIX) 75 MG tablet Take 1 tablet by mouth daily 1/8/21   [facility], DO   magnesium oxide (MAG-OX) 400 (241.3 Mg) MG TABS tablet Take 1 tablet by mouth daily 1/8/21   Lyn Knowles, DO   folic acid (FOLVITE) 1 MG tablet Take 1 tablet by mouth daily 1/8/21   [facility], DO   carvedilol (COREG) 12.5 MG tablet Take 1 tablet by mouth 2 times daily (with meals) 1/7/21   [facility], DO   amLODIPine (NORVASC) 5 MG tablet Take 1 tablet by mouth daily 1/8/21   Kyree Herr, DO   allopurinol (ZYLOPRIM) 300 MG tablet Take 300 mg by mouth daily    Historical Provider, MD   loperamide (IMODIUM) 2 MG capsule Take 2 mg by mouth every 4-6 hours as needed for Diarrhea    Historical Provider, MD       Allergies:  Levofloxacin     Review of Systems:   · Constitutional: there has been no unanticipated weight loss. There's been no significant change in energy level, sleep pattern or activity level. No fever chills or rigors. · Eyes: No visual changes or diplopia. No scleral icterus. · ENT: No Headaches, hearing loss or vertigo. No mouth sores or sore throat. No change in taste or smell. · Cardiovascular: No chest discomfort, +ve dyspnea on exertion, no palpitations, loss of consciousness, no phlebitis, no claudication. · Respiratory: No cough or wheezing, no sputum production. No hemoptysis, pleuritic pain. · Gastrointestinal: No abdominal pain, appetite loss, blood in stools. No change in bowel habits. No hematemesis  · Genitourinary: No dysuria, trouble voiding or hematuria. No nocturia or increased frequency. · Musculoskeletal:  No gait disturbance, weakness or joint complaints. · Integumentary: No rash or pruritis. · Neurological: No headache, diplopia, change in muscle strength, numbness or tingling. No change in gait, balance, coordination, mood, affect, memory, mentation, behavior. · Psychiatric: No anxiety or depression.   · Endocrine: No temperature intolerance. No excessive thirst, fluid intake, or urination. No tremor. · Hematologic/Lymphatic: No abnormal bruising or bleeding, blood clots or swollen lymph nodes. · Allergic/Immunologic: No nasal congestion or hives. Physical Examination:    Vital Signs: /74   Pulse 79   Temp 98.4 °F (36.9 °C)   Resp 18   Ht 6' (1.829 m)   Wt 211 lb 3.2 oz (95.8 kg)   SpO2 98%   BMI 28.64 kg/m²   General appearance: Well preserved, mesomorphic body habitus, alert, no distress. Skin: Skin color, texture, turgor normal. No rashes or lesions. No induration or tightening palpated. Head: Normocephalic. No masses, lesions, tenderness or abnormalities  Eyes: Conjunctivae/corneas clear. PERRL, EOMs intact. Sclera non icteric. Ears: External ears normal. Canals clear. TM's clear bilaterally. Hearing normal to finger rub. Nose/Sinuses: Nares normal. Septum midline. Mucosa normal. No drainage or sinus tenderness. Oropharynx: Lips, mucosa, and tongue normal. Oropharynx clear with no exudate seen. Neck: Stiff cervical collar in place. No adenopathy. Thyroid symmetric, normal size, without nodules. Trachea is midline. Carotids brisk in upstroke without bruits, no abnormal JVP noted at 45°. Chest: Even excursion  Lungs: Lungs clear to auscultation bilaterally. No retractions or use of accessory muscles. No tactile vocal fremitus. No rhonchi, crackles or rales. Heart:  S1 > S2. Regular rhythm. No gallop or murmur. No rub, palpable thrill or heave noted. PMI 5th intercostal space midclavicular line. Abdomen: Abdomen soft, mildly protuberant, non-tender. BS normal. No masses, organomegaly. No hernia noted. Extremities: Extremities normal. No deformitie, or skin discoloration. No cyanosis or clubbing noted to the nails. Peripheral pulses present 2+ upper extremities and present 1+  lower extremities. 3+ pitting edema in lower extremities bilaterally.   Musculoskeletal: Spine ROM normal. Muscular strength intact. Neuro: Cranial nerves intact. Motor: Strength 5/5 in all extremities. Reflexes 2+ in all extremities. No focal weakness. Sensory: grossly normal to touch. Coordination intact. Pertinent Labs:  CBC:   Recent Labs     09/29/21  1532 09/30/21  0840   WBC 6.8 5.8   HGB 12.8 11.4*    227     BMP:  Recent Labs     09/29/21  1532 09/29/21  1532 09/30/21  0840     --  144   K 4.3  --  4.2     --  108*   CO2 23  --  23   BUN 23  --  21   CREATININE 1.0  --  1.0   GLUCOSE 83  --  147*   LABGLOM >60   < > >60    < > = values in this interval not displayed. ABGs:   Lab Results   Component Value Date    PH 7.440 01/02/2021    PO2 66.5 01/02/2021    PCO2 34.3 01/02/2021     INR: No results for input(s): INR in the last 72 hours. PRO-BNP:   Lab Results   Component Value Date    PROBNP 4,185 (H) 09/29/2021    PROBNP 22,147 (H) 12/30/2020      Cardiac Injury Profile: No results for input(s): CKTOTAL, CKMB, CKMBINDEX, TROPONINI in the last 72 hours. Lipid Profile:   Lab Results   Component Value Date    TRIG 54 08/19/2021    HDL 78 08/19/2021    LDLCALC 69 08/19/2021    CHOL 158 08/19/2021      Thyroid:   Lab Results   Component Value Date    TSH 2.590 09/29/2021      Hemoglobin A1C: No components found for: HGBA1C   ECG:  See report    Radiology:  XR CERVICAL SPINE (2-3 VIEWS)    Result Date: 9/15/2021  EXAMINATION: 3 XRAY VIEWS OF THE CERVICAL SPINE 9/15/2021 1:08 pm COMPARISON: 08/18/2021. HISTORY: ORDERING SYSTEM PROVIDED HISTORY: Closed nondisplaced odontoid fracture with type II morphology and routine healing, subsequent encounter TECHNOLOGIST PROVIDED HISTORY: Reason for exam:->s/p type ll odontoid fracture FINDINGS: Three views of the cervical spine were obtained. There is a mildly displaced fracture of the odontoid. There is also a nondisplaced lamina fracture at C2. There is mild anterior malalignment of C4 on C5. This finding is probably related to facet arthritis. Vertebral alignment is otherwise normal.  There is partial developmental fusion of the C2 and C3 vertebrae and the C5 and C6 vertebrae. There are disc space narrowing and discovertebral degenerative changes at C4-5 and C6-7. No prevertebral soft tissue swelling is seen. Mildly displaced odontoid fracture and nondisplaced C2 lamina fracture. Other findings as described. XR CHEST PORTABLE    Result Date: 9/29/2021  EXAMINATION: ONE XRAY VIEW OF THE CHEST 9/29/2021 11:40 am COMPARISON: Two view chest x-ray 08/15/2021 HISTORY: ORDERING SYSTEM PROVIDED HISTORY: palpitations TECHNOLOGIST PROVIDED HISTORY: Reason for exam:->palpitations FINDINGS: There is mild enlargement of the cardiac silhouette, which is similar to the previous exam and may be exaggerated by AP technique. The mediastinal contours are within normal limits. There is evidence of aortic atherosclerosis. There is evidence of a prominent right epicardial fat pad. No consolidations are identified. No significant pleural effusions or pneumothorax. Mild osseous degenerative changes are present. No acute cardiopulmonary process is identified. CTA NECK W CONTRAST    Result Date: 9/15/2021  EXAMINATION: CTA OF THE NECK 9/15/2021 12:55 pm TECHNIQUE: CTA of the neck was performed with the administration of intravenous contrast. Multiplanar reformatted images are provided for review. MIP images are provided for review. Stenosis of the internal carotid arteries measured using NASCET criteria. Dose modulation, iterative reconstruction, and/or weight based adjustment of the mA/kV was utilized to reduce the radiation dose to as low as reasonably achievable. COMPARISON: CTA of the neck, 08/15/2021. HISTORY: ORDERING SYSTEM PROVIDED HISTORY: Injury of right vertebral artery, subsequent encounter FINDINGS: AORTIC ARCH/ARCH VESSELS: No dissection or arterial injury. No significant stenosis of the brachiocephalic or subclavian arteries.  CAROTID ARTERIES: Atherosclerosis, predominantly calcified, seen along the carotid bulbs bilaterally, greater on the left. Approximately 50% stenosis at the origin of the left ICA (series 4 image 117). Less than 50% stenosis of the origin of the right ICA. VERTEBRAL ARTERIES: No dissection, arterial injury, or significant stenosis. SOFT TISSUES: The lung apices are clear. No cervical or superior mediastinal lymphadenopathy. The larynx and pharynx are unremarkable. No acute abnormality of the salivary and thyroid glands. BONES: No acute osseous abnormality. Redemonstration of fractures of the C2 vertebra, involving the base of the dense (type 2) as well as the bilateral posterior C2 lamina. 1. No contour abnormality indicative of dissection or injury in the carotid or vertebral [id number]. 2. Approximately 50% stenosis at the origin of the left ICA. 3. Persistent nondisplaced fractures through the base of the C2 dens (type 2) as well as the posterior bilateral C2 laminae. Assessment:    Active Problems:    DVT (deep venous thrombosis) ([facility])    Traumatic closed fracture of C2 vertebra with minimal displacement, initial encounter ([facility] [address]    Atrial flutter with rapid ventricular response ([facility])  Resolved Problems:    * No resolved hospital problems. *      Plan:  Mr. Maynor Yeh has suffered a recent fall and facture of C2 cervical spine, and now presents with aflutter RVR that spontaneously reverted to NSR after several hours of a cardizem drip last night. His last echocardiogram was in Dec 2020 showing an EF 28%. 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 Will order echo.  Thus recommend place the patient on amiodarone 200mg QD for two weeks and 100mg QD thereafter and monitor his thyroid and liver function as the likelihood of pulmonary toxicity in this dosage is extremity low. Will follow up as outpatient. Awaiting completion of echo. I have spent more than 50 minutes face to face with Chaparro Ledbetter reviewing notes and laboratory data with greater than 50% of this time instructing and counseling the patient regarding my findings and recommendations and I have answered all questions as posed to me by  Jackson Claros. Thank you, Candance Malm, MD for allowing me to consult in the care of this patient. Jeff Desai DO , FACP, [facility], [facility]    NOTE:  This report was transcribed using voice recognition software. Every effort was made to ensure accuracy; however, inadvertent computerized transcription errors may be present.

## 2021-09-30 NOTE — CARE COORDINATION
Introduced my self and provided explanation of CM role to patient. Patient is awake, alert, and aware of current diagnosis and treatment plan including cardiac consult for rate control/ serial lab monitoring.- eliquis patient PTA    Patient sent from PCP (Dr. Arun Cueva) for evaluations of palpitations. Patient was found to be in a flutter RVR and which required treatment with Cardizem gtt. Patient also has a C2 fx to which a C- Collar is in place. Patient has a history of [facility] at [facility], but currently resides at home with domestic partner, Dipika Willams. 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 [facility]. Patient states he has a cane he uses at home to aid in ambulation. Patient denies any issue with pharmaceuticals or transportation to medical appts. Plan at discharge remains home with resumption of [place] [id number] services and Dipika Willams is able to transport home. BIB placed. Will follow along with  and assist with discharge planning as necessary. Explained ELOS of 24 hours pending cardiac consultation; patient voiced understanding and agreement.       Francisco Monday, RN  Case Management

## 2021-09-30 NOTE — PROGRESS NOTES
Patient's \"roommate\" contacted on Home number [phone] and message left to call back in the morning to go over patient's home medications.     Electronically signed by Lucie Felder RN on 9/30/2021 at 3:49 AM

## 2021-09-30 NOTE — H&P
CHIEF COMPLAINT:  Palpitations. HISTORY OF PRESENT ILLNESS:      The patient is a 64 y.o. male patient of Dr. Valentin Claudio who presents with the above complaint to his Primary Care physician's office. After being found in AF fib/flutter with very RVR, he was ambulance transferred to the emergency department. He does have a history of having previous COVID-19 disease as well as a DVT prompting him to be placed on Eliquis. The patient also 2 weeks ago had a traumatic fracture of C2 with minimal displacement for which she was admitted for he continues to be in a c-collar for. He states no chest pain shortness of breath nausea vomiting diarrhea abdominal pain urinary symptoms or leg swelling. Since admission, he converted to NSR on Cardizem drip at 0230 9/30.       Past Medical History:    Past Medical History:   Diagnosis Date    [facility] (congestive heart failure) ([facility] [address]     Gout        Past Surgical History:    Past Surgical History:   Procedure Laterality Date    HC INSERT PICC CATH, 5/> YRS  1/2/2021            Medications Prior to Admission:    Medications Prior to Admission: aspirin 81 MG chewable tablet, Take 1 tablet by mouth daily  thiamine 100 MG tablet, Take 1 tablet by mouth daily  pantoprazole (PROTONIX) 40 MG tablet, Take 1 tablet by mouth every morning (before breakfast)  Multiple Vitamin (MULTIVITAMIN) TABS tablet, Take 1 tablet by mouth daily  clopidogrel (PLAVIX) 75 MG tablet, Take 1 tablet by mouth daily  magnesium oxide (MAG-OX) 400 (241.3 Mg) MG TABS tablet, Take 1 tablet by mouth daily  folic acid (FOLVITE) 1 MG tablet, Take 1 tablet by mouth daily  carvedilol (COREG) 12.5 MG tablet, Take 1 tablet by mouth 2 times daily (with meals)  amLODIPine (NORVASC) 5 MG tablet, Take 1 tablet by mouth daily  allopurinol (ZYLOPRIM) 300 MG tablet, Take 300 mg by mouth daily  loperamide (IMODIUM) 2 MG capsule, Take 2 mg by mouth every 4-6 hours as needed for Diarrhea    Allergies: Levofloxacin    Social History:    reports that he has never smoked. He has never used smokeless tobacco. He reports current alcohol use. He reports that he does not use drugs. Family History:   family history includes Breast Cancer in his mother; No Known Problems in his brother, sister, and sister. REVIEW OF SYSTEMS:  As above in the HPI, otherwise negative    PHYSICAL EXAM:    Vitals:  /78   Pulse 72   Temp 97.8 °F (36.6 °C) (Oral)   Resp 16   Ht 6' (1.829 m)   Wt 211 lb 3.2 oz (95.8 kg)   SpO2 92%   BMI 28.64 kg/m²     General:  Awake, alert, oriented X 3. Well developed, well nourished, well groomed. No apparent distress. HEENT:  Normocephalic, atraumatic. Pupils equal, round, reactive to light. No scleral icterus. No conjunctival injection. Normal lips, teeth, and gums. No nasal discharge. Neck:  Supple  Heart:  RRR, no murmurs, gallops, rubs  Lungs:  CTA bilaterally, bilat symmetrical expansion, no wheeze, rales, or rhonchi  Abdomen:   Bowel sounds present, soft, nontender, no masses, no organomegaly, no peritoneal signs  Extremities:  No clubbing, cyanosis, or edema  Skin:  Warm and dry, no open lesions or rash  Neuro:  Cranial nerves 2-12 intact, no focal deficits  Breast: deferred  Rectal: deferred  Genitalia:  deferred    LABS:  CBC:   Lab Results   Component Value Date    WBC 6.8 09/29/2021    RBC 3.74 09/29/2021    HGB 12.8 09/29/2021    HCT 40.6 09/29/2021    .6 09/29/2021    MCH 34.2 09/29/2021    MCHC 31.5 09/29/2021    RDW 14.0 09/29/2021     09/29/2021    MPV 9.9 09/29/2021     BMP:    Lab Results   Component Value Date     09/29/2021    K 4.3 09/29/2021    K 4.3 08/16/2021     09/29/2021    CO2 23 09/29/2021    BUN 23 09/29/2021    LABALBU 3.7 08/19/2021    CREATININE 1.0 09/29/2021    CALCIUM 9.9 09/29/2021    GFRAA >60 09/29/2021    LABGLOM >60 09/29/2021    GLUCOSE 83 09/29/2021     TSH:    Lab Results   Component Value Date    TSH 2.590 09/29/2021         ASSESSMENT:      Active Problems:    Atrial flutter with rapid ventricular response ([address]  Plan: Already on Eliquis. Will await Cardiology input re po med titration. PLAN:    See orders.     Yvette Mcmillan MD  7:01 AM  9/30/2021

## 2021-09-30 NOTE — PROGRESS NOTES
Call placed to Dr. Adelso Laughlin office with request for norco per pt.  awiating callback or order

## 2021-10-01 VITALS
OXYGEN SATURATION: 95 % | TEMPERATURE: 98.3 F | DIASTOLIC BLOOD PRESSURE: 66 MMHG | SYSTOLIC BLOOD PRESSURE: 128 MMHG | RESPIRATION RATE: 15 BRPM | BODY MASS INDEX: 28.61 KG/M2 | HEART RATE: 76 BPM | WEIGHT: 211.2 LBS | HEIGHT: 72 IN

## 2021-10-01 LAB
ANION GAP SERPL CALCULATED.3IONS-SCNC: 12 MMOL/L (ref 7–16)
BUN BLDV-MCNC: 18 MG/DL (ref 6–23)
CALCIUM SERPL-MCNC: 9.6 MG/DL (ref 8.6–10.2)
CHLORIDE BLD-SCNC: 105 MMOL/L (ref 98–107)
CO2: 25 MMOL/L (ref 22–29)
CREAT SERPL-MCNC: 1 MG/DL (ref 0.7–1.2)
GFR AFRICAN AMERICAN: >60
GFR NON-AFRICAN AMERICAN: >60 ML/MIN/1.73
GLUCOSE BLD-MCNC: 132 MG/DL (ref 74–99)
HCT VFR BLD CALC: 36.1 % (ref 37–54)
HEMOGLOBIN: 11.2 G/DL (ref 12.5–16.5)
MCH RBC QN AUTO: 34.4 PG (ref 26–35)
MCHC RBC AUTO-ENTMCNC: 31 % (ref 32–34.5)
MCV RBC AUTO: 110.7 FL (ref 80–99.9)
PDW BLD-RTO: 13.9 FL (ref 11.5–15)
PLATELET # BLD: 224 E9/L (ref 130–450)
PMV BLD AUTO: 10.2 FL (ref 7–12)
POTASSIUM SERPL-SCNC: 3.9 MMOL/L (ref 3.5–5)
RBC # BLD: 3.26 E12/L (ref 3.8–5.8)
SODIUM BLD-SCNC: 142 MMOL/L (ref 132–146)
WBC # BLD: 6.1 E9/L (ref 4.5–11.5)

## 2021-10-01 PROCEDURE — 36415 COLL VENOUS BLD VENIPUNCTURE: CPT

## 2021-10-01 PROCEDURE — 6370000000 HC RX 637 (ALT 250 FOR IP): Performed by: INTERNAL MEDICINE

## 2021-10-01 PROCEDURE — 2580000003 HC RX 258: Performed by: INTERNAL MEDICINE

## 2021-10-01 PROCEDURE — 85027 COMPLETE CBC AUTOMATED: CPT

## 2021-10-01 PROCEDURE — 80048 BASIC METABOLIC PNL TOTAL CA: CPT

## 2021-10-01 RX ORDER — AMLODIPINE BESYLATE 5 MG/1
5 TABLET ORAL DAILY
Qty: 30 TABLET | Refills: 3 | Status: SHIPPED | OUTPATIENT
Start: 2021-10-01

## 2021-10-01 RX ORDER — AMIODARONE HYDROCHLORIDE 200 MG/1
TABLET ORAL
Qty: 21 TABLET | Refills: 0 | Status: SHIPPED | OUTPATIENT
Start: 2021-10-02 | End: 2021-10-30

## 2021-10-01 RX ADMIN — PANTOPRAZOLE SODIUM 40 MG: 40 TABLET, DELAYED RELEASE ORAL at 07:04

## 2021-10-01 RX ADMIN — Medication 100 MG: at 08:52

## 2021-10-01 RX ADMIN — CLOPIDOGREL BISULFATE 75 MG: 75 TABLET ORAL at 08:51

## 2021-10-01 RX ADMIN — SODIUM CHLORIDE, PRESERVATIVE FREE 10 ML: 5 INJECTION INTRAVENOUS at 08:52

## 2021-10-01 RX ADMIN — Medication 400 MG: at 08:51

## 2021-10-01 RX ADMIN — HYDROCODONE BITARTRATE AND ACETAMINOPHEN 1 TABLET: 7.5; 325 TABLET ORAL at 10:41

## 2021-10-01 RX ADMIN — APIXABAN 5 MG: 5 TABLET, FILM COATED ORAL at 08:51

## 2021-10-01 RX ADMIN — HYDROCODONE BITARTRATE AND ACETAMINOPHEN 1 TABLET: 7.5; 325 TABLET ORAL at 04:39

## 2021-10-01 RX ADMIN — AMIODARONE HYDROCHLORIDE 200 MG: 200 TABLET ORAL at 08:52

## 2021-10-01 RX ADMIN — ALLOPURINOL 300 MG: 300 TABLET ORAL at 08:51

## 2021-10-01 RX ADMIN — FOLIC ACID 1 MG: 1 TABLET ORAL at 08:51

## 2021-10-01 RX ADMIN — CARVEDILOL 12.5 MG: 6.25 TABLET, FILM COATED ORAL at 08:51

## 2021-10-01 ASSESSMENT — PAIN DESCRIPTION - FREQUENCY: FREQUENCY: CONTINUOUS

## 2021-10-01 ASSESSMENT — PAIN DESCRIPTION - LOCATION
LOCATION: NECK
LOCATION: NECK

## 2021-10-01 ASSESSMENT — PAIN SCALES - GENERAL
PAINLEVEL_OUTOF10: 7

## 2021-10-01 ASSESSMENT — PAIN DESCRIPTION - ORIENTATION: ORIENTATION: POSTERIOR

## 2021-10-01 ASSESSMENT — PAIN DESCRIPTION - PROGRESSION: CLINICAL_PROGRESSION: NOT CHANGED

## 2021-10-01 ASSESSMENT — PAIN DESCRIPTION - DIRECTION: RADIATING_TOWARDS: SHOULDERS

## 2021-10-01 ASSESSMENT — PAIN DESCRIPTION - DESCRIPTORS: DESCRIPTORS: ACHING;DISCOMFORT

## 2021-10-01 ASSESSMENT — PAIN DESCRIPTION - ONSET: ONSET: ON-GOING

## 2021-10-01 ASSESSMENT — PAIN DESCRIPTION - PAIN TYPE
TYPE: SURGICAL PAIN
TYPE: SURGICAL PAIN

## 2021-10-01 ASSESSMENT — PAIN - FUNCTIONAL ASSESSMENT: PAIN_FUNCTIONAL_ASSESSMENT: PREVENTS OR INTERFERES SOME ACTIVE ACTIVITIES AND ADLS

## 2021-10-01 NOTE — PROGRESS NOTES
PROGRESS NOTE       PATIENT PROBLEM LIST:  Active Problems:    DVT (deep venous thrombosis) ([facility])    Traumatic closed fracture of C2 vertebra with minimal displacement, initial encounter ([facility] [address]    Atrial flutter with rapid ventricular response ([facility])  Resolved Problems:    * No resolved hospital problems. *      SUBJECTIVE:  Shaggy Recinos states he feels well today and is tolerating the amiodarone well. He has no shortness of breath, palpitations, chest pain or lightheadedness. He states he is ready to return home. REVIEW OF SYSTEMS:  General ROS: negative for - fatigue, malaise,  weight gain or weight loss  Psychological ROS: negative for - anxiety , depression  Ophthalmic ROS: negative for - decreased vision or visual distortion. ENT ROS: negative  Allergy and Immunology ROS: negative  Hematological and Lymphatic ROS: negative  Endocrine: no heat or cold intolerance and no polyphagia, polydipsia, or polyuria  Respiratory ROS: negative for - cough, pleuritic pain and shortness of breath  Cardiovascular ROS: negative for - irregular heartbeat, palpitations and shortness of breath. Gastrointestinal ROS: no abdominal pain, change in bowel habits, or black or bloody stools  Genito-Urinary ROS: no nocturia, dysuria, trouble voiding, frequency or hematuria  Musculoskeletal ROS: negative for- myalgias, arthralgias, or claudication  Neurological ROS: no TIA or stroke symptoms otherwise no significant change in symptoms or problems since yesterday as documented in previous progress notes.     SCHEDULED MEDICATIONS:   amiodarone  200 mg Oral Daily    allopurinol  300 mg Oral Daily    carvedilol  12.5 mg Oral BID WC    clopidogrel  75 mg Oral Daily    folic acid  1 mg Oral Daily    pantoprazole  40 mg Oral QAM AC    thiamine  100 mg Oral Daily    magnesium oxide  400 mg Oral Daily    apixaban  5 mg Oral BID       VITAL SIGNS: in the last 72 hours. PRO-BNP:   Lab Results   Component Value Date    PROBNP 4,185 (H) 09/29/2021    PROBNP 22,147 (H) 12/30/2020      TSH:   Lab Results   Component Value Date    TSH 2.590 09/29/2021      Cardiac Injury Profile: No results for input(s): CKTOTAL, CKMB, TROPONINI in the last 72 hours. Lipid Profile:   Lab Results   Component Value Date    TRIG 54 08/19/2021    HDL 78 08/19/2021    LDLCALC 69 08/19/2021    CHOL 158 08/19/2021      Hemoglobin A1C: No components found for: HGBA1C     RAD:   XR CERVICAL SPINE (2-3 VIEWS)    Result Date: 9/15/2021  EXAMINATION: 3 XRAY VIEWS OF THE CERVICAL SPINE 9/15/2021 1:08 pm COMPARISON: 08/18/2021. HISTORY: ORDERING SYSTEM PROVIDED HISTORY: Closed nondisplaced odontoid fracture with type II morphology and routine healing, subsequent encounter TECHNOLOGIST PROVIDED HISTORY: Reason for exam:->s/p type ll odontoid fracture FINDINGS: Three views of the cervical spine were obtained. There is a mildly displaced fracture of the odontoid. There is also a nondisplaced lamina fracture at C2. There is mild anterior malalignment of C4 on C5. This finding is probably related to facet arthritis. Vertebral alignment is otherwise normal.  There is partial developmental fusion of the C2 and C3 vertebrae and the C5 and C6 vertebrae. There are disc space narrowing and discovertebral degenerative changes at C4-5 and C6-7. No prevertebral soft tissue swelling is seen. Mildly displaced odontoid fracture and nondisplaced C2 lamina fracture. Other findings as described. XR CHEST PORTABLE    Result Date: 9/29/2021  EXAMINATION: ONE XRAY VIEW OF THE CHEST 9/29/2021 11:40 am COMPARISON: Two view chest x-ray 08/15/2021 HISTORY: ORDERING SYSTEM PROVIDED HISTORY: palpitations TECHNOLOGIST PROVIDED HISTORY: Reason for exam:->palpitations FINDINGS: There is mild enlargement of the cardiac silhouette, which is similar to the previous exam and may be exaggerated by AP technique. The mediastinal contours are within normal limits. There is evidence of aortic atherosclerosis. There is evidence of a prominent right epicardial fat pad. No consolidations are identified. No significant pleural effusions or pneumothorax. Mild osseous degenerative changes are present. No acute cardiopulmonary process is identified. CTA NECK W CONTRAST    Result Date: 9/15/2021  EXAMINATION: CTA OF THE NECK 9/15/2021 12:55 pm TECHNIQUE: CTA of the neck was performed with the administration of intravenous contrast. Multiplanar reformatted images are provided for review. MIP images are provided for review. Stenosis of the internal carotid arteries measured using NASCET criteria. Dose modulation, iterative reconstruction, and/or weight based adjustment of the mA/kV was utilized to reduce the radiation dose to as low as reasonably achievable. COMPARISON: CTA of the neck, 08/15/2021. HISTORY: ORDERING SYSTEM PROVIDED HISTORY: Injury of right vertebral artery, subsequent encounter FINDINGS: AORTIC ARCH/ARCH VESSELS: No dissection or arterial injury. No significant stenosis of the brachiocephalic or subclavian arteries. CAROTID ARTERIES: Atherosclerosis, predominantly calcified, seen along the carotid bulbs bilaterally, greater on the left. Approximately 50% stenosis at the origin of the left ICA (series 4 image 117). Less than 50% stenosis of the origin of the right ICA. VERTEBRAL ARTERIES: No dissection, arterial injury, or significant stenosis. SOFT TISSUES: The lung apices are clear. No cervical or superior mediastinal lymphadenopathy. The larynx and pharynx are unremarkable. No acute abnormality of the salivary and thyroid glands. BONES: No acute osseous abnormality. Redemonstration of fractures of the C2 vertebra, involving the base of the dense (type 2) as well as the bilateral posterior C2 lamina.      1. No contour abnormality indicative of dissection or injury in the carotid or vertebral [id number]. 2. Approximately 50% stenosis at the origin of the left ICA. 3. Persistent nondisplaced fractures through the base of the C2 dens (type 2) as well as the posterior bilateral C2 laminae. EKG: See Report  Echo: See Report      IMPRESSIONS:  Active Problems:    DVT (deep venous thrombosis) ([facility])    Traumatic closed fracture of C2 vertebra with minimal displacement, initial encounter ([facility] [address]    Atrial flutter with rapid ventricular response ([facility])  Resolved Problems:    * No resolved hospital problems. *      RECOMMENDATIONS:  Mr. Charmaine Iverson remains in sinus rhythm and is doing well from a cardiac standpoint. His EF from last December was 28%, yesterday echo showed around LVEF 55%. Perhaps his original ventricular dysfunction was attributed to tachycardia induced cardiomyopathy which we will attempt to control with introduction of amiodarone. He is ok to be discharged from my perspective. He will continue taking amiodarone 200mg QD for two weeks total at which point will switch to 100mg daily and will follow up with cardiology outpatient in 8 weeks. I have spent more than 25 minutes face to face with Quinton Nur and reviewing notes and laboratory data, with greater than 50% of this time instructing and counseling the patient face to face regarding my findings and recommendations and I have answered all questions as posed to me by Mr. Charmaine Iverson. Janus Hodgkins,  FACP,FACC,[facility]      NOTE:  This report was transcribed using voice recognition software.   Every effort was made to ensure accuracy; however, inadvertent computerized transcription errors may be present

## 2021-10-01 NOTE — PROGRESS NOTES
Cardiology note appreciated. Subjective:  Looks good/feels good. No c/o. The patient is awake and alert. No problems overnight. Denies chest pain, angina, and dyspnea. Denies abdominal pain. Tolerating diet. No nausea or vomiting. Objective:  Remains NSR. /82   Pulse 77   Temp 98.7 °F (37.1 °C) (Oral)   Resp 16   Ht 6' (1.829 m)   Wt 211 lb 3.2 oz (95.8 kg)   SpO2 95%   BMI 28.64 kg/m²     Heart:  RRR, no murmurs, gallops, or rubs. Lungs:  CTA bilaterally, no wheeze, rales or rhonchi  Abd: bowel sounds present, nontender, nondistended, no masses  Extrem:  No clubbing, cyanosis, or edema  Neuro:  CNs, sensory, Cerebellar intact. CBC:   Lab Results   Component Value Date    WBC 5.8 09/30/2021    RBC 3.26 09/30/2021    HGB 11.4 09/30/2021    HCT 35.9 09/30/2021    .1 09/30/2021    MCH 35.0 09/30/2021    MCHC 31.8 09/30/2021    RDW 14.0 09/30/2021     09/30/2021    MPV 10.1 09/30/2021     BMP:    Lab Results   Component Value Date     09/30/2021    K 4.2 09/30/2021    K 4.3 08/16/2021     09/30/2021    CO2 23 09/30/2021    BUN 21 09/30/2021    LABALBU 3.7 08/19/2021    CREATININE 1.0 09/30/2021    CALCIUM 9.7 09/30/2021    GFRAA >60 09/30/2021    LABGLOM >60 09/30/2021    GLUCOSE 147 09/30/2021        Assessment:  Await Echo repor and any further Cardiology intervention. Patient Active Problem List   Diagnosis    DVT (deep venous thrombosis) ([facility])    CHF with unknown LVEF (HCC)    Chest pain    COVID-19    Hypokalemia    Hypomagnesemia    Fever and chills    Hypoxia    Acute hypoxemic respiratory failure due to severe acute respiratory syndrome coronavirus 2 (SARS-CoV-2) disease ([facility] [address]    Traumatic closed fracture of C2 vertebra with minimal displacement, initial encounter ([address]    Atrial flutter with rapid ventricular response ([address]       Plan:  Anticipate DC later today.           Casa Galicia MD  7:11 AM  10/1/2021

## 2021-10-04 ENCOUNTER — CARE COORDINATION (OUTPATIENT)
Dept: CARE COORDINATION | Age: 76
End: 2021-10-04

## 2021-10-04 NOTE — CARE COORDINATION
Care Transitions Outreach Attempt    Call within 2 business days of discharge: Yes     Attempted to reach patient by telephone. Left HIPAA compliant message requesting a return call. Will attempt to reach patient again. Patient: Yamini Doyle Patient : 1945 MRN: [id number]    Last Discharge [facility]       Complaint Diagnosis Description Type Department Provider    21 Atrial Flutter Atrial flutter with rapid ventricular response [facility]) ED to Hosp-Admission (Discharged) (ADMITTED) [facility] 6W Michelle Navarro MD; [facility] [place]. .. Was this an external facility discharge?  No Discharge Facility: [facility]    Noted following upcoming appointments from discharge chart review:   [address] follow up appointment(s):   Future Appointments   Date Time Provider [facility]   2021  2:00 PM Ria ANTUNEZURG [facility]   3/30/2022  9:15 AM Anna Soulier, MD [facility]/MED [facility]     Non-[facility] follow up appointment(s):

## 2021-10-05 ENCOUNTER — CARE COORDINATION (OUTPATIENT)
Dept: CARE COORDINATION | Age: 76
End: 2021-10-05

## 2021-11-12 NOTE — DISCHARGE SUMMARY
Physician Discharge Summary     Patient ID:  Quinton Nur  [id number]  58 y.o.  1945    Admit date: 9/29/2021    Discharge date and time: 10/1/2021  4:13 PM     Admission Diagnoses: Atrial flutter with rapid ventricular response ([address] [I48.92]    Discharge Diagnoses:    Atrial flutter with rapid ventricular response ([address]      Traumatic closed fracture of C2 vertebra with minimal displacement      Consults: cardiology    Procedures: Echocardiogram(EF 55%)    Hospital Course: The patient is a 64 y.o. male patient of Dr. Aryan Brizuela who presents with the above complaint to his Primary Care physician's office. After being found in AF fib/flutter with very RVR, he was ambulance transferred to the emergency department. He does have a history of having previous COVID-19 disease as well as a DVT prompting him to be placed on Eliquis. The patient also 2 weeks ago had a traumatic fracture of C2 with minimal displacement for which she was admitted for he continues to be in a c-collar for. He states no chest pain shortness of breath nausea vomiting diarrhea abdominal pain urinary symptoms or leg swelling. Since admission, he converted to NSR on Cardizem drip at 0230 9/30. His last echocardiogram was in Dec 2020 showing an EF 28%. The echo on this admission showed around LVEF 55%. Perhaps his original ventricular dysfunction was attributed to tachycardia induced cardiomyopathy which we will attempt to control with introduction of amiodarone . Condition at discharge:  Stable            Discharge Exam:  See progress note from today    Disposition: home    Patient Instructions:   Discharge Medication List as of 10/1/2021  2:55 PM      START taking these medications    Details   amiodarone (CORDARONE) 200 MG tablet Take 1 tablet by mouth daily for 14 days, THEN 0.5 tablets daily for 14 days. , Disp-21 tablet, R-0Normal      apixaban (ELIQUIS) 5 MG TABS tablet Take 1 tablet by mouth 2 times daily, Disp-60 tablet, R-0Normal         CONTINUE these medications which have CHANGED    Details   amLODIPine (NORVASC) 5 MG tablet Take 1 tablet by mouth daily, Disp-30 tablet, R-3Normal         CONTINUE these medications which have NOT CHANGED    Details   aspirin 81 MG chewable tablet Take 1 tablet by mouth daily, Disp-30 tablet, R-3DC to SNF      thiamine 100 MG tablet Take 1 tablet by mouth daily, Disp-30 tablet, R-0DC to SNF      pantoprazole (PROTONIX) 40 MG tablet Take 1 tablet by mouth every morning (before breakfast), Disp-30 tablet, R-0DC to SNF      Multiple Vitamin (MULTIVITAMIN) TABS tablet Take 1 tablet by mouth daily, Disp-30 tablet, R-0DC to SNF      clopidogrel (PLAVIX) 75 MG tablet Take 1 tablet by mouth daily, Disp-30 tablet, R-0DC to SNF      magnesium oxide (MAG-OX) 400 (241.3 Mg) MG TABS tablet Take 1 tablet by mouth daily, Disp-30 tablet, K-0NF to SNF      folic acid (FOLVITE) 1 MG tablet Take 1 tablet by mouth daily, Disp-30 tablet, R-0DC to SNF      carvedilol (COREG) 12.5 MG tablet Take 1 tablet by mouth 2 times daily (with meals), Disp-60 tablet, R-0DC to SNF      allopurinol (ZYLOPRIM) 300 MG tablet Take 300 mg by mouth dailyHistorical Med      loperamide (IMODIUM) 2 MG capsule Take 2 mg by mouth every 4-6 hours as needed for DiarrheaHistorical Med           Activity: activity as tolerated  Diet: cardiac diet    Follow-up with Dr. Danyell Marquez in 5 days.     Note that over 30 minutes was spent in preparing discharge papers, discussing discharge with patient, medication review, etc.    Signed:  Noreen Cook MD  11/12/2021  5:25 AM

## 2021-11-17 ENCOUNTER — HOSPITAL ENCOUNTER (OUTPATIENT)
Dept: GENERAL RADIOLOGY | Age: 76
Discharge: HOME | End: 2021-11-19
Payer: MEDICARE

## 2021-11-17 ENCOUNTER — OFFICE VISIT (OUTPATIENT)
Dept: NEUROSURGERY | Age: 76
End: 2021-11-17
Payer: MEDICARE

## 2021-11-17 ENCOUNTER — HOSPITAL ENCOUNTER (OUTPATIENT)
Age: 76
Discharge: HOME | End: 2021-11-19
Payer: MEDICARE

## 2021-11-17 VITALS
BODY MASS INDEX: 27.77 KG/M2 | RESPIRATION RATE: 16 BRPM | DIASTOLIC BLOOD PRESSURE: 82 MMHG | TEMPERATURE: 97.9 F | SYSTOLIC BLOOD PRESSURE: 134 MMHG | HEIGHT: 72 IN | WEIGHT: 205 LBS | OXYGEN SATURATION: 97 % | HEART RATE: 86 BPM

## 2021-11-17 DIAGNOSIS — S12.112D CLOSED NONDISPLACED ODONTOID FRACTURE WITH TYPE II MORPHOLOGY AND ROUTINE HEALING, SUBSEQUENT ENCOUNTER: Primary | ICD-10-CM

## 2021-11-17 DIAGNOSIS — S12.100A TRAUMATIC CLOSED FRACTURE OF C2 VERTEBRA WITH MINIMAL DISPLACEMENT, INITIAL ENCOUNTER (HCC): ICD-10-CM

## 2021-11-17 PROCEDURE — 99213 OFFICE O/P EST LOW 20 MIN: CPT | Performed by: PHYSICIAN ASSISTANT

## 2021-11-17 PROCEDURE — 1036F TOBACCO NON-USER: CPT | Performed by: PHYSICIAN ASSISTANT

## 2021-11-17 PROCEDURE — G8484 FLU IMMUNIZE NO ADMIN: HCPCS | Performed by: PHYSICIAN ASSISTANT

## 2021-11-17 PROCEDURE — 72040 X-RAY EXAM NECK SPINE 2-3 VW: CPT

## 2021-11-17 PROCEDURE — G8417 CALC BMI ABV UP PARAM F/U: HCPCS | Performed by: PHYSICIAN ASSISTANT

## 2021-11-17 PROCEDURE — 4040F PNEUMOC VAC/ADMIN/RCVD: CPT | Performed by: PHYSICIAN ASSISTANT

## 2021-11-17 PROCEDURE — 1123F ACP DISCUSS/DSCN MKR DOCD: CPT | Performed by: PHYSICIAN ASSISTANT

## 2021-11-17 PROCEDURE — G8427 DOCREV CUR MEDS BY ELIG CLIN: HCPCS | Performed by: PHYSICIAN ASSISTANT

## 2022-03-07 ENCOUNTER — TELEPHONE (OUTPATIENT)
Dept: VASCULAR SURGERY | Age: 77
End: 2022-03-07

## 2022-03-25 DIAGNOSIS — I65.23 BILATERAL CAROTID ARTERY STENOSIS: Primary | ICD-10-CM

## 2022-04-19 ENCOUNTER — TELEPHONE (OUTPATIENT)
Dept: VASCULAR SURGERY | Age: 77
End: 2022-04-19

## 2022-04-20 ENCOUNTER — HOSPITAL ENCOUNTER (OUTPATIENT)
Dept: CARDIOLOGY | Age: 77
Discharge: HOME | End: 2022-04-20
Payer: MEDICARE

## 2022-04-20 ENCOUNTER — OFFICE VISIT (OUTPATIENT)
Dept: VASCULAR SURGERY | Age: 77
End: 2022-04-20
Payer: MEDICARE

## 2022-04-20 VITALS — HEIGHT: 71 IN | BODY MASS INDEX: 28 KG/M2 | WEIGHT: 200 LBS

## 2022-04-20 DIAGNOSIS — I65.23 BILATERAL CAROTID ARTERY STENOSIS: ICD-10-CM

## 2022-04-20 DIAGNOSIS — I65.22 STENOSIS OF LEFT CAROTID ARTERY: Chronic | ICD-10-CM

## 2022-04-20 PROCEDURE — G8427 DOCREV CUR MEDS BY ELIG CLIN: HCPCS | Performed by: SURGERY

## 2022-04-20 PROCEDURE — 99213 OFFICE O/P EST LOW 20 MIN: CPT | Performed by: SURGERY

## 2022-04-20 PROCEDURE — 93880 EXTRACRANIAL BILAT STUDY: CPT

## 2022-04-20 PROCEDURE — G8417 CALC BMI ABV UP PARAM F/U: HCPCS | Performed by: SURGERY

## 2022-04-20 PROCEDURE — 4040F PNEUMOC VAC/ADMIN/RCVD: CPT | Performed by: SURGERY

## 2022-04-20 PROCEDURE — 1123F ACP DISCUSS/DSCN MKR DOCD: CPT | Performed by: SURGERY

## 2022-04-20 PROCEDURE — 1036F TOBACCO NON-USER: CPT | Performed by: SURGERY

## 2022-04-20 NOTE — PROGRESS NOTES
[facility] Heart & Vascular Lab - [facility]    This is a pre read worksheet - prior to official physician interpretation    Ifeanyi uFentesrajwinder  1945  Date of study: 4/20/22    Indication for study:  Carotid artery stenosis  Study : Bilateral Carotid Artery Duplex Examination    Duplex examination of the RIGHT carotid artery system identifies atherosclerotic plaque. The peak systolic velocity in internal carotid artery was 71 centimeters / second. The maximum end diastolic velocity was 15 centimeters / second. The ICA/CCA ratio is 0.8. The right vertebral artery has antegrade flow. Duplex examination of the LEFT carotid artery system identifies atherosclerotic plaque. The peak systolic velocity in internal carotid artery was 119 centimeters / second. The maximum end diastolic velocity was 21 centimeters / second. The ICA/CCA ratio is 1.6. The left vertebral artery has antegrade flow.       LAST STUDY  9/15/2021 CTA @ [facility]

## 2022-04-20 NOTE — PROGRESS NOTES
Vascular Surgery Outpatient Progress Note      Chief Complaint   Patient presents with    Circulatory Problem     DG       HISTORY OF PRESENT ILLNESS:                The patient is a 64 y.o. male who returns for follow-up evaluation of patient with a history of trauma. Overall he is doing well he denies any chest pain shortness of breath or discomfort. He denies any right-sided left-sided weakness numbness or vision changes. He is an avid drinker. He drinks vodka daily. He presented initially with a fall which we are consulted at that time. Right now he denies any lateralizing symptoms and no other current complaints other than some intermittent headache. He presents with carotid ultrasound findings which are essentially unremarkable with mild disease also went back and looked at his CT scan again. Past Medical History:        Diagnosis Date    CHF (congestive heart failure) (HCC)     Gout      Past Surgical History:        Procedure Laterality Date    [facility]. INSERT PICC CATH, 5/> YRS  1/2/2021          Current Medications:   Prior to Admission medications    Medication Sig Start Date End Date Taking?  Authorizing Provider   apixaban (ELIQUIS) 5 MG TABS tablet Take 1 tablet by mouth 2 times daily 10/1/21  Yes David Larios MD   amLODIPine (NORVASC) 5 MG tablet Take 1 tablet by mouth daily 10/1/21  Yes David Larios MD   aspirin 81 MG chewable tablet Take 1 tablet by mouth daily 8/19/21  Yes ROSALBA Puente NP   thiamine 100 MG tablet Take 1 tablet by mouth daily 1/8/21  Yes Kyree Bowman DO   pantoprazole (PROTONIX) 40 MG tablet Take 1 tablet by mouth every morning (before breakfast) 1/8/21  Yes Kendell Bowman DO   Multiple Vitamin (MULTIVITAMIN) TABS tablet Take 1 tablet by mouth daily 1/8/21  Yes Kyree Bowman DO   clopidogrel (PLAVIX) 75 MG tablet Take 1 tablet by mouth daily 1/8/21  Yes Kyree Bowman DO   magnesium oxide (MAG-OX) 400 (241.3 Mg) MG TABS tablet Take 1 tablet by mouth daily 1/8/21  Yes Kyree Bowman, DO   folic acid (FOLVITE) 1 MG tablet Take 1 tablet by mouth daily 1/8/21  Yes Kyree Bowman DO   carvedilol (COREG) 12.5 MG tablet Take 1 tablet by mouth 2 times daily (with meals) 1/7/21  Yes Kyree Bowman DO   allopurinol (ZYLOPRIM) 300 MG tablet Take 300 mg by mouth daily   Yes Historical Provider, MD   loperamide (IMODIUM) 2 MG capsule Take 2 mg by mouth every 4-6 hours as needed for Diarrhea   Yes Historical Provider, MD   amiodarone (CORDARONE) 200 MG tablet Take 1 tablet by mouth daily for 14 days, THEN 0.5 tablets daily for 14 days. 10/2/21 10/30/21  Sun Almanza MD     Allergies:  Levofloxacin    Social History     Socioeconomic History    Marital status: Unknown     Spouse name: Not on file    Number of children: Not on file    Years of education: Not on file    Highest education level: Not on file   Occupational History    Not on file   Tobacco Use    Smoking status: Never Smoker    Smokeless tobacco: Never Used   Vaping Use    Vaping Use: Never used   Substance and Sexual Activity    Alcohol use: Yes     Comment: fifth of vodka daily    Drug use: Never    Sexual activity: Not on file   Other Topics Concern    Not on file   Social History Narrative    Not on file     Social Determinants of Health     Financial Resource Strain:     Difficulty of Paying Living Expenses: Not on file   Food Insecurity:     Worried About Running Out of Food in the Last Year: Not on file    Maddie of Food in the Last Year: Not on file   Transportation Needs:     Lack of Transportation (Medical): Not on file    Lack of Transportation (Non-Medical):  Not on file   Physical Activity:     Days of Exercise per Week: Not on file    Minutes of Exercise per Session: Not on file   Stress:     Feeling of Stress : Not on file   Social Connections:     Frequency of Communication with Friends and Family: Not on file    Frequency of Social Gatherings with Friends and Family: Not on file    Attends Yazidism Services: Not on file    Active Member of Clubs or Organizations: Not on file    Attends Club or Organization Meetings: Not on file    Marital Status: Not on file   Intimate Partner Violence:     Fear of Current or Ex-Partner: Not on file    Emotionally Abused: Not on file    Physically Abused: Not on file    Sexually Abused: Not on file   Housing Stability:     Unable to Pay for Housing in the Last Year: Not on file    Number of [place] in the Last Year: Not on file    Unstable Housing in the Last Year: Not on file        Family History   Problem Relation Age of Onset    Breast Cancer Mother     No Known Problems Sister     No Known Problems Brother     No Known Problems Sister        REVIEW OF SYSTEMS:    Constitutional: Negative for activity change, appetite change, chills, diaphoresis, fatigue, fever and unexpected weight change. HEENT: Some intermittent headaches negative for congestion, ear discharge, ear pain, hearing loss, nosebleeds, rhinorrhea, sinus pain, sore throat, tinnitus, trouble swallowing and voice change. Eyes: Negative for photophobia, pain, discharge, redness, itching and visual disturbance. Respiratory: Negative for apnea, cough, chest tightness, shortness of breath and wheezing. Cardiovascular: Negative for chest pain, palpitations and leg swelling. Gastrointestinal: Negative for abdominal distention, abdominal pain, blood in stool, constipation, diarrhea, nausea and vomiting. Endocrine: Negative for cold intolerance, heat intolerance, polydipsia, polyphagia and polyuria. Genitourinary: Negative for decreased urine volume, difficulty urinating, dysuria, frequency, hematuria and urgency. Musculoskeletal: Negative for arthralgias, back pain, gait problem, joint swelling and neck pain. Skin: Negative for color change, pallor, rash and wound.    Allergic/Immunologic: Negative for environmental allergies, food allergies and immunocompromised state. Neurological: Negative for dizziness, syncope, facial asymmetry, speech difficulty, weakness, light-headedness, numbness and headaches. Hematological: Negative for adenopathy. Does not bruise/bleed easily. Psychiatric/Behavioral: Negative for agitation, confusion, sleep disturbance and suicidal ideas. The patient is not nervous/anxious. Neuro: See HPI        PHYSICAL EXAM:  There were no vitals filed for this visit. General Appearance: alert and oriented to person, place and time, well developed and well- nourished, in no acute distress, walks with a cane  Skin: warm and dry, no rash or erythema  Head: normocephalic and atraumatic  Eyes: extraocular eye movements intact, conjunctivae normal  ENT: external ear and ear canal normal bilaterally, nose without deformity  Pulmonary/Chest: clear to auscultation bilaterally- no wheezes, rales or rhonchi, normal air movement, no respiratory distress  Cardiovascular: normal rate, regular rhythm, normal S1 and S2, no murmurs, no carotid bruits  Abdomen: soft, non-tender, non-distended, normal bowel sounds, no masses or organomegaly  Musculoskeletal: normal range of motion, no joint swelling, deformity or tenderness  Neurologic: no cranial nerve deficit, gait, coordination and speech normal  Extremities: Bilateral palpable brachial radial pulses bilateral palpable femorals. He has shoes on currently without rather not take them off but there is no gross signs of vascular ischemia. He has 1+ edema bilaterally. Mac Kimbrough  1945  Date of study: 4/20/22     Indication for study:  Carotid artery stenosis  Study : Bilateral Carotid Artery Duplex Examination     Duplex examination of the RIGHT carotid artery system identifies atherosclerotic plaque. The peak systolic velocity in internal carotid artery was 71 centimeters / second. The maximum end diastolic velocity was 15 centimeters / second. The ICA/CCA ratio is 0.8.   The right vertebral artery has antegrade flow.     Duplex examination of the LEFT carotid artery system identifies atherosclerotic plaque. The peak systolic velocity in internal carotid artery was 119 centimeters / second. The maximum end diastolic velocity was 21 centimeters / second. The ICA/CCA ratio is 1.6. The left vertebral artery has antegrade flow.        LAST STUDY  9/15/2021 CTA @ [facility]     Problem List Items Addressed This Visit     Stenosis of left carotid artery (Chronic)          Carotid artery disease. At this point he remains asymptomatic otherwise is doing well. Has really no complaints. Right side he has less than 50% stenosis. On the left side is a little bit more close to 50% stenosis based on the CT scan and the ultrasound. He has bilateral antegrade vertebral flow he will follow-up in 1 year    Reduction therapy was reviewed. He is on antiplatelet therapy and a multitude of other additional medications for his heart including carvedilol amlodipine amiodarone and Eliquis    No follow-ups on file.

## 2023-04-26 NOTE — PROGRESS NOTES
Occupational Therapy  OT BEDSIDE TREATMENT NOTE   [address] [facility] [place] [address]  [address] [place], [place] [address]Z:  Patient Name: Roverto Acevedo  MRN: [id number]  : 1945  Room: [facility]-M     Per OT Eval:    Evaluating OT: Rosita Hu OTR/L [id number]     Referring Provider[de-identified] Kirby Kenney DO                              Specific Provider Orders/Date: OT evaluation and treatment 8/15/21     Diagnosis: Traumatic closed fracture of C2 vertebra with minimal displacement, initial encounter [facility])     Pertinent Medical History: CHF, gout, COVID,       Precautions:  Fall Risk, Aspen collar, cervical precautions,      Assessment of current deficits   [x]? Functional mobility             [x]?ADLs           [x]? Strength                  []?Cognition   [x]? Functional transfers           []? IADLs         [x]? Safety Awareness   [x]? Endurance   []? Fine Coordination              [x]? Balance      []? Vision/perception   []? Sensation     []? Gross Motor Coordination  [x]? ROM           []?  Delirium                   []? Motor Control      OT PLAN OF CARE   OT POC based on physician orders, patient diagnosis and results of clinical assessment     Frequency/Duration  2-5 days/wk for 2 weeks PRN   Specific OT Treatment to include:   * Instruction/training on adapted ADL techniques and AE recommendations to increase functional independence within precautions       * Training on energy conservation strategies, correct breathing pattern and techniques to improve independence/tolerance for self-care routine  * Functional transfer/mobility training/DME recommendations for increased independence, safety, and fall prevention  * Patient/Family education to increase follow through with safety techniques and functional independence  * Therapeutic exercise to improve motor endurance, ROM, and functional strength for ADLs/functional transfers  * Therapeutic activities to facilitate/challenge dynamic balance, stand tolerance for increased safety and independence with ADLs  * Positioning to improve skin integrity, interaction with environment and functional independence     Recommended Adaptive Equipment:  TBD      Home Living: Pt lives with SO in a 2 story home with bed and bath on 2nd. Bathroom setup: walk in shower with shower chair, standard commode with riser,    Equipment owned: hurry cane, toilet riser     Prior Level of Function: indep with ADLs , indep with IADLs; ambulated with hurry cane  Driving: no     Pain Level: chronic generalized pain 6/10  Cognition: A&O: x 2, pt was upset when asked month & year, raising his voice Bhupendra Heller do I have to do this again? \" therefore deferred further assessment for cognition  Follows 2 step directions: good               Memory: fair, possible decreased STM? Sequencing:  fair               Problem solving:  fair               Judgement/safety:  fair      Functional Assessment:   AM-PAC Daily Activity Raw Score: 14/24       Initial Eval Status  Date: 8/16/21 Treatment Status  Date:  8/17/21 STG=LTG  Time frame: 10-14 days   Feeding Stand by Assist  Mod Indep  Encouraged pt to sit up in chair  Independent    Grooming Moderate Assist   Min A  Simple task, washing off his face, assist to ensure pt did not wash near injured area on forehead, to prevent bleeding Modified [place]    UB Dressing Minimal Assist   Min A  Doff/donnging gown, cues on modified techniques due to weakness & discomfort in L UE Modified [place]    LB Dressing Maximal Assist  Mod A  Instructed pt on use of AE to ease doff/donning socks, continues to educate on donning pants Stand by Assist    Bathing Moderate Assist  UB:  Mod A  LB: Max A  Sponge bathing at EOB with wipes Stand by Assist    Toileting Moderate Assist   Dep  Incontinent of BM noted once standing, total assist with hygiene with SO assisting with  hygiene  Stand by Assist Bed Mobility  Supine to sit: Moderate Assist with spinal neutrality/log roll tech  Sit to supine: Moderate Assist   Min A  Supine to sit  With HOB upright  Supine to sit: Modified [place]   Sit to supine: Modified [place]    Functional Transfers Sit to stand: Minimal Assist   Stand to sit: Minimal Assist   Stand pivot: NT   Min A  Cues for hand placement & safety  Modified [place]    Functional Mobility Min A with ww  3 side steps  Min A  Cues for walker management & safety  Mod indep with AAD   Balance Sitting: SBA     Standing: min A  Sitting; SBA  Standing: Min A  With walker  Sitting: indep      Standing: modified indep with AAD   Activity Tolerance poor Fair   Improved comfort once seated in chair  good   Visual/  Perceptual Glasses: yes             BUE  ROM/Strength/  Fine motor Coordination Hand dominance: R     BUE: ROM limited in shoulders but grossly WFL     Strength: grossly 3+/5      Strength:  WFL     Coordination:   WFL          Education:  Pt was educated through out treatment regarding proper technique & safety with bed mobility, functional transfers & mobility, ADL compensatory strategies to ease tasks, improve safety & prevent falls to return home safely. Comments: Upon arrival pt was in bed & agreeable for therapy, SO present. At end of session pt was seated in chair SO present, will remain present while pt is up in chair, all lines and tubes intact, call light within reach. Nsg aware pt was up in chair. · Pt has made Fair progress towards set goals. · Continue with current plan of care    Treatment Time In: 1:20           Treatment Time Out: 2:00           Treatment Charges: Mins Units   Ther Ex  [id number]     Manual Therapy 40337     Thera Activities 23536 10 1   ADL/Home Mgt 17758 30 2   Neuro Re-ed 53559     Group Therapy      Orthotic manage/training  21597     Non-Billable Time     Total Timed Treatment 40 3       [facility]  [address] [place] [address] Drive, [address] [place] [address] no